# Patient Record
Sex: MALE | Employment: FULL TIME | URBAN - METROPOLITAN AREA
[De-identification: names, ages, dates, MRNs, and addresses within clinical notes are randomized per-mention and may not be internally consistent; named-entity substitution may affect disease eponyms.]

---

## 2021-06-10 NOTE — PROGRESS NOTES
SUBJECTIVE:   CC: Albert Kumar is an 36 year old male who presents for preventative health visit.     Patient has been advised of split billing requirements and indicates understanding: Yes     Healthy Habits:     Getting at least 3 servings of Calcium per day:  Yes    Bi-annual eye exam:  NO    Dental care twice a year:  Yes    Sleep apnea or symptoms of sleep apnea:  Excessive snoring and Sleep apnea    Diet:  Regular (no restrictions)    Frequency of exercise:  2-3 days/week    Duration of exercise:  30-45 minutes    Taking medications regularly:  Yes    Medication side effects:  None    PHQ-2 Total Score: 1    Additional concerns today:  No    Pt would like to discuss seasonal allergies - pt reports worsening congestion affecting sleep, wakes up several times a night.           PROBLEMS TO ADD ON...  -------------------------------------  Overall well.  Nasal congestion, dripping, sneezing, puffy eyes spring mostly. Wondering how to best treat.     Exercises jogging 3-4 times/week.  From [place] originally.  New real estate business.     Describes mood as being very good despite phq2.      Not feeling sleeping during day but does snore.       Today's PHQ-2 Score:   PHQ-2 ( 1999 [employer]) 6/14/2021   Q1: Little interest or pleasure in doing things 0   Q2: Feeling down, depressed or hopeless 1   PHQ-2 Score 1   Q1: Little interest or pleasure in doing things Not at all   Q2: Feeling down, depressed or hopeless Several days   PHQ-2 Score 1       Abuse: Current or Past(Physical, Sexual or Emotional)- No  Do you feel safe in your environment? Yes    Have you ever done Advance Care Planning? (For example, a Health Directive, POLST, or a discussion with a medical provider or your loved ones about your wishes): No, advance care planning information given to patient to review.  Patient declined advance care planning discussion at this time.    Social History     Tobacco Use     Smoking status: Former Smoker      Types: Cigars     Smokeless tobacco: Never Used   Substance Use Topics     Alcohol use: Yes     If you drink alcohol do you typically have >3 drinks per day or >7 drinks per week? Yes      Alcohol Use 6/14/2021   Prescreen: >3 drinks/day or >7 drinks/week? Yes   AUDIT SCORE  9     AUDIT - Alcohol Use Disorders Identification Test - Reproduced from the World Health Organization Audit 2001 (Second Edition) 6/14/2021   1.  How often do you have a drink containing alcohol? 4 or more times a week   2.  How many drinks containing alcohol do you have on a typical day when you are drinking? 3 or 4   3.  How often do you have five or more drinks on one occasion? Monthly   4.  How often during the last year have you found that you were not able to stop drinking once you had started? Never   5.  How often during the last year have you failed to do what was normally expected of you because of drinking? Never   6.  How often during the last year have you needed a first drink in the morning to get yourself going after a heavy drinking session? Never   7.  How often during the last year have you had a feeling of guilt or remorse after drinking? Less than monthly   8.  How often during the last year have you been unable to remember what happened the night before because of your drinking? Less than monthly   9.  Have you or someone else been injured because of your drinking? No   10. Has a relative, friend, doctor or other health care worker been concerned about your drinking or suggested you cut down? No   TOTAL SCORE 9       Last PSA: No results found for: PSA    Reviewed orders with patient. Reviewed health maintenance and updated orders accordingly - Yes      Reviewed and updated as needed this visit by clinical staff  Tobacco  Allergies  Meds   Med Hx  Surg Hx  Fam Hx  Soc Hx        Reviewed and updated as needed this visit by Provider                History reviewed. No pertinent past medical history.   History reviewed.  "No pertinent surgical history.    Review of Systems  CONSTITUTIONAL: NEGATIVE for fever, chills, change in weight  INTEGUMENTARY/SKIN: NEGATIVE for worrisome rashes, moles or lesions  EYES: NEGATIVE for vision changes or irritation  ENT: NEGATIVE for ear, mouth and throat problems  RESP: NEGATIVE for significant cough or SOB  CV: NEGATIVE for chest pain, palpitations or peripheral edema  GI: NEGATIVE for nausea, abdominal pain, heartburn, or change in bowel habits   male: negative for dysuria, hematuria, decreased urinary stream, erectile dysfunction, urethral discharge  MUSCULOSKELETAL: NEGATIVE for significant arthralgias or myalgia  NEURO: NEGATIVE for weakness, dizziness or paresthesias  PSYCHIATRIC: NEGATIVE for changes in mood or affect    OBJECTIVE:   /85 (Patient Position: Sitting, Cuff Size: Adult Regular)   Pulse 77   Temp 96.9  F (36.1  C) (Tympanic)   Resp 22   Ht 1.803 m (5' 11\")   Wt 95.5 kg (210 lb 9.6 oz)   SpO2 96%   BMI 29.37 kg/m      Physical Exam  GENERAL: healthy, alert and no distress  EYES: Eyes grossly normal to inspection, PERRL and conjunctivae and sclerae normal  HENT: ear canals and TM's normal, nose and mouth without ulcers or lesions  NECK: no adenopathy, no asymmetry, masses, or scars and thyroid normal to palpation  RESP: lungs clear to auscultation - no rales, rhonchi or wheezes  CV: regular rate and rhythm, normal S1 S2, no S3 or S4, no murmur, click or rub, no peripheral edema and peripheral pulses strong  ABDOMEN: soft, nontender, no hepatosplenomegaly, no masses and bowel sounds normal   (male): normal male genitalia without lesions or urethral discharge, no hernia  MS: no gross musculoskeletal defects noted, no edema  SKIN: no suspicious lesions or rashes  NEURO: Normal strength and tone, mentation intact and speech normal  PSYCH: mentation appears normal, affect normal/bright        ASSESSMENT/PLAN:   1. Routine general medical examination at a [facility] " "facility  Reviewed preventive care health maintenance items/immunizations.     2. Seasonal allergies  Recommend fluticasone nasal spray (or nasonex over the counter) daily and nasal washes with neti pot or saline nasal spray.  New pillows yearly/hyypoallergenic pillow covers and washing sheets in hot water weekly.     - fluticasone (FLONASE) 50 MCG/ACT nasal spray; Spray 1 spray into both nostrils daily  Dispense: 16 g; Refill: 11    3. CARDIOVASCULAR SCREENING; LDL GOAL LESS THAN 160    - Lipid panel reflex to direct LDL Fasting    4. Need for Tdap vaccination    - TDAP VACCINE (Adacel, Boostrix)  [[id number]]    5. Screening for HIV (human immunodeficiency virus)    - HIV Antigen Antibody Combo    6. Encounter for hepatitis C screening test for low risk patient    - Hepatitis C Screen Reflex to HCV RNA Quant and Genotype    7. Screening for diabetes mellitus      8. Overweight (BMI 25.0-29.9)    - Glucose    Patient has been advised of split billing requirements and indicates understanding: Yes  COUNSELING:   Reviewed preventive health counseling, as reflected in patient instructions       Regular exercise       Healthy diet/nutrition    Estimated body mass index is 29.37 kg/m  as calculated from the following:    Height as of this encounter: 1.803 m (5' 11\").    Weight as of this encounter: 95.5 kg (210 lb 9.6 oz).     Weight management plan: Discussed healthy diet and exercise guidelines    He reports that he has quit smoking. His smoking use included cigars. He has never used smokeless tobacco.      Counseling Resources:  ATP IV Guidelines  Pooled Cohorts Equation Calculator  FRAX Risk Assessment  ICSI Preventive Guidelines  Dietary Guidelines for Americans, 2010  USDA's MyPlate  ASA Prophylaxis  Lung CA Screening    Joel Daniel Wegener, MD  [facility] [place]  "

## 2021-06-14 ENCOUNTER — OFFICE VISIT (OUTPATIENT)
Dept: FAMILY MEDICINE | Facility: CLINIC | Age: 36
End: 2021-06-14
Payer: COMMERCIAL

## 2021-06-14 VITALS
SYSTOLIC BLOOD PRESSURE: 133 MMHG | OXYGEN SATURATION: 96 % | RESPIRATION RATE: 22 BRPM | HEIGHT: 71 IN | TEMPERATURE: 96.9 F | HEART RATE: 77 BPM | BODY MASS INDEX: 29.48 KG/M2 | WEIGHT: 210.6 LBS | DIASTOLIC BLOOD PRESSURE: 85 MMHG

## 2021-06-14 DIAGNOSIS — Z00.00 ROUTINE GENERAL MEDICAL EXAMINATION AT A HEALTH CARE FACILITY: Primary | ICD-10-CM

## 2021-06-14 DIAGNOSIS — Z11.59 ENCOUNTER FOR HEPATITIS C SCREENING TEST FOR LOW RISK PATIENT: ICD-10-CM

## 2021-06-14 DIAGNOSIS — Z23 NEED FOR TDAP VACCINATION: ICD-10-CM

## 2021-06-14 DIAGNOSIS — Z13.6 CARDIOVASCULAR SCREENING; LDL GOAL LESS THAN 160: ICD-10-CM

## 2021-06-14 DIAGNOSIS — J30.2 SEASONAL ALLERGIES: ICD-10-CM

## 2021-06-14 DIAGNOSIS — Z11.4 SCREENING FOR HIV (HUMAN IMMUNODEFICIENCY VIRUS): ICD-10-CM

## 2021-06-14 DIAGNOSIS — E66.3 OVERWEIGHT (BMI 25.0-29.9): ICD-10-CM

## 2021-06-14 DIAGNOSIS — Z13.1 SCREENING FOR DIABETES MELLITUS: ICD-10-CM

## 2021-06-14 LAB
CHOLEST SERPL-MCNC: 255 MG/DL
GLUCOSE SERPL-MCNC: 84 MG/DL (ref 70–99)
HCV AB SERPL QL IA: NONREACTIVE
HDLC SERPL-MCNC: 61 MG/DL
HIV 1+2 AB+HIV1 P24 AG SERPL QL IA: NONREACTIVE
LDLC SERPL CALC-MCNC: 169 MG/DL
NONHDLC SERPL-MCNC: 194 MG/DL
TRIGL SERPL-MCNC: 127 MG/DL

## 2021-06-14 PROCEDURE — 90715 TDAP VACCINE 7 YRS/> IM: CPT | Performed by: FAMILY MEDICINE

## 2021-06-14 PROCEDURE — 36415 COLL VENOUS BLD VENIPUNCTURE: CPT | Performed by: FAMILY MEDICINE

## 2021-06-14 PROCEDURE — 86803 HEPATITIS C AB TEST: CPT | Performed by: FAMILY MEDICINE

## 2021-06-14 PROCEDURE — 80061 LIPID PANEL: CPT | Performed by: FAMILY MEDICINE

## 2021-06-14 PROCEDURE — 82947 ASSAY GLUCOSE BLOOD QUANT: CPT | Performed by: FAMILY MEDICINE

## 2021-06-14 PROCEDURE — 87389 HIV-1 AG W/HIV-1&-2 AB AG IA: CPT | Performed by: FAMILY MEDICINE

## 2021-06-14 PROCEDURE — 99385 PREV VISIT NEW AGE 18-39: CPT | Mod: 25 | Performed by: FAMILY MEDICINE

## 2021-06-14 PROCEDURE — 90471 IMMUNIZATION ADMIN: CPT | Performed by: FAMILY MEDICINE

## 2021-06-14 RX ORDER — FLUTICASONE PROPIONATE 50 MCG
1 SPRAY, SUSPENSION (ML) NASAL DAILY
Qty: 16 G | Refills: 11 | Status: SHIPPED | OUTPATIENT
Start: 2021-06-14 | End: 2022-12-15

## 2021-06-14 ASSESSMENT — MIFFLIN-ST. JEOR: SCORE: 1902.41

## 2021-06-14 NOTE — NURSING NOTE
Prior to immunization administration, verified patients identity using patient s name and date of birth. Please see Immunization Activity for additional information.     Screening Questionnaire for Adult Immunization    Are you sick today?   No   Do you have allergies to medications, food, a vaccine component or latex?   No   Have you ever had a serious reaction after receiving a vaccination?   No   Do you have a long-term health problem with heart, lung, kidney, or metabolic disease (e.g., diabetes), asthma, a blood disorder, no spleen, complement component deficiency, a cochlear implant, or a spinal fluid leak?  Are you on long-term aspirin therapy?   No   Do you have cancer, leukemia, HIV/AIDS, or any other immune system problem?   No   Do you have a parent, brother, or sister with an immune system problem?   No   In the past 3 months, have you taken medications that affect  your immune system, such as prednisone, other steroids, or anticancer drugs; drugs for the treatment of rheumatoid arthritis, Crohn s disease, or psoriasis; or have you had radiation treatments?   No   Have you had a seizure, or a brain or other nervous system problem?   No   During the past year, have you received a transfusion of blood or blood    products, or been given immune (gamma) globulin or antiviral drug?   No   For women: Are you pregnant or is there a chance you could become       pregnant during the next month?   No   Have you received any vaccinations in the past 4 weeks?   No     Immunization questionnaire answers were all negative.        Per orders of Dr. Wegener, injection of Adacel given by Nataly Mercado MA. Patient instructed to remain in clinic for 15 minutes afterwards, and to report any adverse reaction to me immediately.       Screening performed by Nataly Mercado MA on 6/14/2021 at 10:35 AM.  Nataly Mercado MA on 6/14/2021 at 10:35 AM

## 2021-06-14 NOTE — LETTER
"Eladia 15, 2021      Albert Kumar  [address] [facility] [id number]      Dear Albert Kumar,     I have reviewed your lab and radiology reports and no further evaluation or medication changes are needed unless your atherosclerotic heart cardiovascular disease risk score (ASCVD- based on cholesterol) was not in a good range - please see that below.       I have provided information about common lab tests.     If you have questions please send me a Koozoot message, telephone call or follow up with a virtual visit.     Best,     Joel Wegener,MD   --------------------------------------------------   LAB REFERENCE INFORMATION       If we checked your cholesterol your \"heart attack\"  risk score is below this is also called ASCVD or atherosclerotic cardiovascular disease risk score.   The goal is to be under10% for people who have had  heart attacks, strokes,  diabetes or vascular disease. For others the goal is to at least be below 20% .     The ASCVD Risk score (Rashadolivia RAMIRES Jr., et al., 2013) failed to calculate for the following reasons:     The 2013 ASCVD risk score is only valid for ages 40 to 79       CMP/BMP (metabolic panels with electrolyte levels,  kidney and liver function).  If these results require medication changes I would let you know.     MICROALBUMIN - urine protein test that also looks for kidney damage.     GLUCOSE (included in metabolic panels above) - Fasting levels above 100 are in the pre-diabetic range.  Levels above 124 are in the diabetic range.         TSH with T4  - (thyroid hormones).  If you labs were not normal and you do not know the plan for this please contact me.     Hemoglobin A1c - \"Diabetes control test\" - goal for most patients to be below 7.       Vitamin D - summer sun needed to get enough.  Low levels related to fatigue and loss of bone strength.  If your level is low I recommend taking 1, 000 to 3, 000 units daily over the winter.  If it is normal you can take 400 " units daily.     CBC (complete blood counts).  Screen for anemia, bleeding disorders, leukemia.  If significant abnormal or unstable results I will let you know.     PSA (Prostate Specific Antigen) - If this is elevated it can be a sign of prostate cancer or also an enlarged prostate. If yours is high you should see a urologist to look into it further.     Resulted Orders   Lipid panel reflex to direct LDL Fasting   Result Value Ref Range    Cholesterol 255 (H) <200 mg/dL      Comment:      Desirable:       <200 mg/dl    Triglycerides 127 <150 mg/dL      Comment:      Fasting specimen    HDL Cholesterol 61 >39 mg/dL    LDL Cholesterol Calculated 169 (H) <100 mg/dL      Comment:      Above desirable:  100-129 mg/dl  Borderline High:  130-159 mg/dL  High:             160-189 mg/dL  Very high:       >189 mg/dl      Non HDL Cholesterol 194 (H) <130 mg/dL      Comment:      Above Desirable:  130-159 mg/dl  Borderline high:  160-189 mg/dl  High:             190-219 mg/dl  Very high:       >219 mg/dl     Glucose   Result Value Ref Range    Glucose 84 70 - 99 mg/dL      Comment:      Fasting specimen   Hepatitis C Screen Reflex to HCV RNA Quant and Genotype   Result Value Ref Range    Hepatitis C Antibody Nonreactive NR^Nonreactive      Comment:      Assay performance characteristics have not been established for newborns,   infants, and children     HIV Antigen Antibody Combo   Result Value Ref Range    HIV Antigen Antibody Combo Nonreactive NR^Nonreactive          Comment:      HIV-1 p24 Ag & HIV-1/HIV-2 Ab Not Detected

## 2021-09-18 ENCOUNTER — HEALTH MAINTENANCE LETTER (OUTPATIENT)
Age: 36
End: 2021-09-18

## 2021-12-16 ENCOUNTER — TELEPHONE (OUTPATIENT)
Dept: BEHAVIORAL HEALTH | Facility: CLINIC | Age: 36
End: 2021-12-16
Payer: COMMERCIAL

## 2021-12-16 ASSESSMENT — ANXIETY QUESTIONNAIRES
4. TROUBLE RELAXING: MORE THAN HALF THE DAYS
GAD7 TOTAL SCORE: 13
2. NOT BEING ABLE TO STOP OR CONTROL WORRYING: MORE THAN HALF THE DAYS
7. FEELING AFRAID AS IF SOMETHING AWFUL MIGHT HAPPEN: SEVERAL DAYS
GAD7 TOTAL SCORE: 13
6. BECOMING EASILY ANNOYED OR IRRITABLE: NEARLY EVERY DAY
1. FEELING NERVOUS, ANXIOUS, OR ON EDGE: MORE THAN HALF THE DAYS
5. BEING SO RESTLESS THAT IT IS HARD TO SIT STILL: MORE THAN HALF THE DAYS
7. FEELING AFRAID AS IF SOMETHING AWFUL MIGHT HAPPEN: SEVERAL DAYS
3. WORRYING TOO MUCH ABOUT DIFFERENT THINGS: SEVERAL DAYS

## 2021-12-17 ASSESSMENT — ANXIETY QUESTIONNAIRES: GAD7 TOTAL SCORE: 13

## 2021-12-20 ENCOUNTER — HOSPITAL ENCOUNTER (OUTPATIENT)
Dept: BEHAVIORAL HEALTH | Facility: CLINIC | Age: 36
Discharge: HOME | End: 2021-12-20
Attending: FAMILY MEDICINE | Admitting: FAMILY MEDICINE
Payer: COMMERCIAL

## 2021-12-20 VITALS — BODY MASS INDEX: 27.09 KG/M2 | HEIGHT: 72 IN | WEIGHT: 200 LBS

## 2021-12-20 PROCEDURE — H0001 ALCOHOL AND/OR DRUG ASSESS: HCPCS | Mod: GT,95

## 2021-12-20 ASSESSMENT — COLUMBIA-SUICIDE SEVERITY RATING SCALE - C-SSRS
2. HAVE YOU ACTUALLY HAD ANY THOUGHTS OF KILLING YOURSELF LIFETIME?: NO
1. IN THE PAST MONTH, HAVE YOU WISHED YOU WERE DEAD OR WISHED YOU COULD GO TO SLEEP AND NOT WAKE UP?: NO
1. IN THE PAST MONTH, HAVE YOU WISHED YOU WERE DEAD OR WISHED YOU COULD GO TO SLEEP AND NOT WAKE UP?: NO
2. HAVE YOU ACTUALLY HAD ANY THOUGHTS OF KILLING YOURSELF?: NO

## 2021-12-20 ASSESSMENT — PAIN SCALES - GENERAL: PAINLEVEL: NO PAIN (0)

## 2021-12-20 ASSESSMENT — ANXIETY QUESTIONNAIRES: GAD7 TOTAL SCORE: 13

## 2021-12-20 ASSESSMENT — MIFFLIN-ST. JEOR: SCORE: 1870.19

## 2021-12-20 NOTE — PROGRESS NOTES
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  alcohol.  4.)  Craving, or a strong desire or urge to use the substance.  Met for:  alcohol.  5.)  Recurrent use of the substance resulting in a failure to fulfill major role obligations at work, school, or home.  Met for:  alcohol.  6.)  Continued use of the substance despite having persistent or recurrent social or interpersonal problems caused or exacerbated by the effects of its use.  Met for:  alcohol.  7.)  Important social, occupational, or recreational activities are given up or reduced because of the substance.  Met for:  alcohol.  8.)  Recurrent use of the substance in which it is physically hazardous.  Met for:  alcohol.  10.)  Tolerance:  either a need for markedly increased amounts of the substance to achieve the desired effect or a markedly diminished effect with continued use of the dame amount of the substance.  Met for:  alcohol.  11.)  Withdrawal:  either patient endorses characteristic withdrawal syndrome for the substance or the substance (or closely related substance) is taken to relieve or avoid withdrawal symptoms.  Met for:  alcohol.    As evidenced by self report and criteria, the patient meets the following DSM-5 Diagnoses: (Sustained by DSM-5  Criteria Listed Above)      1.)  Alcohol Use Disorder Severe - 303.90 (F10.20)    Specify if: In early remission:  After full criteria for alcohol/drug use disorder were previously met, none of the criteria for alcohol/drug use disorder have been met for at least 3 months but for less than 12 months (with the exception that Criterion A4,  Craving or a strong desire or urge to use alcohol/drug  may be met).     In sustained remission:   After full criteria for alcohol use disorder were previously met, none of the criteria for alcohol/drug use disorder have been met at any time during a period of 12 months or longer (with the exception that Criterion A4,  Craving or strong desire or urge to use alcohol/drug  may be met).     Specify if:   This additional specifier is used if the individual is in an environment where access to alcohol is restricted.    Mild: Presence of 2-3 symptoms  Moderate: Presence of 4-5 symptoms  Severe: Presence of 6 or more symptoms    Recommendations:     1. Plan for Safety and Risk Management:     It was recommended the patient call 911 or go to the local Emergency Department should there be any significant change in the above risk factors.            Report to child / adult protection services was NA.    2. [facility] Referrals:      Recommendations:      1.)  It was recommended the patient abstain from alcohol and from all other non-prescribed mood altering chemicals.   2.)  Follow all of the recommendations of his healthcare providers.  3.)  He may benefit from having an outpatient mental health evaluation to rule out having a current clinical mental health disorder.  4.)  Enter a virtual or hybrid Intensive Outpatient program at one of [facility] locations for substance use disorder treatment.  5.)  Follow all of the recommendations of his substance use disorder treatment providers.  6.)  Attend 12-step or other support group meetings on a weekly basis.     Additionally, if the patient  were unable to maintain abstinence from alcohol at the intensive outpatient level of care, he may benefit from considering entering a residential substance use disorder treatment program.    Rational for recommended level of care: The patient lacks long-term sober maintenance skills, lacks sober coping skills, lacks awareness regarding the disease model of addiction, lacks a sober peer support network and he works full time as a  so he lacks an alcohol-free work environment.    The patient reported he is willing to follow the above recommendations.    The patient would like the following family or other support people involved in their treatment:  None at this time.    The patient does not have a history of opiate use.    3.  Mental Health Referrals:     The patient may benefit from having an outpatient mental health evaluation to rule out having a current clinical mental health disorder.    4. Cultural Concerns:    The patient did not identify having any cultural concerns regarding mental health, physical health, or substance use issues.     5. Recommendations for treatment focus:      Alcohol / Substance Use - See #2. [facility] Referrals above for details on recommendations.    Provider Name/ Credentials:  RONALDO Tejeda  December 20, 2021

## 2022-01-04 ENCOUNTER — DOCUMENTATION ONLY (OUTPATIENT)
Dept: ADDICTION MEDICINE | Facility: HOSPITAL | Age: 37
End: 2022-01-04
Payer: COMMERCIAL

## 2022-01-04 NOTE — PROGRESS NOTES
No Show to Intake    D)P[facility]et was no show to today's intake for substance use disorder outpatient treatment  A)Patient was contacted twice before the intake to remind and twice after he missed the appointment.  Pt was asked to contact  if he needs to reschedule intake.     Jacky Nash [facility] 1/4/2022 12:30 PM

## 2022-01-07 ENCOUNTER — VIRTUAL VISIT (OUTPATIENT)
Dept: ADDICTION MEDICINE | Facility: CLINIC | Age: 37
End: 2022-01-07
Payer: COMMERCIAL

## 2022-01-07 ENCOUNTER — HOSPITAL ENCOUNTER (EMERGENCY)
Facility: CLINIC | Age: 37
End: 2022-01-07
Payer: COMMERCIAL

## 2022-01-07 ENCOUNTER — DOCUMENTATION ONLY (OUTPATIENT)
Dept: ADDICTION MEDICINE | Facility: HOSPITAL | Age: 37
End: 2022-01-07
Payer: COMMERCIAL

## 2022-01-07 DIAGNOSIS — F10.20 ALCOHOL USE DISORDER, SEVERE, DEPENDENCE (H): ICD-10-CM

## 2022-01-07 PROCEDURE — H2035 A/D TX PROGRAM, PER HOUR: HCPCS | Mod: GT,95

## 2022-01-07 NOTE — NURSING NOTE
Comprehensive Assessment     Based on client interview, review of previous assessments and   comprehensive assessment interview the following diagnosis and recommendations are:     Date: 2022     Client: Albert Kumar  MRN; [id number]   : 1985  Age: 36 year old Sex: male  Last 4 digits of Social Security: 1483  Race/Ethnicity:   Do you belong to a [place]: No Which [place]? NA [place]ide on reservation: [place]  Marital Status: Single  Sexual Orientation: Heterosexual  Employment: Full time                                                                                                                      Level of Education:  Bachelors Degree in [employer] Socio-economic (source of income) Status: Employment and investment, etc.    Is assistance required in the ability to read and understand written material?   No    Reason for admission today:  Drinking went down from social drinking to daily heavy drinking.  Family and self concerned of the situation.         Dimension I Acute intoxication/Withdrawal Potential:    Chemical use history (patient self-reported use within the last 30 days)  Primary Drug Used  Age of First Use  Most Recent Pattern of Use and Duration    Date of last use  Time if substance use in the last 30 days Withdrawal Potential? Requiring special care  Method of use   (oral, smoked, snort, IV, etc)    Alcohol  11 Daily,drinking of 15-20 drinks 1/3/2022 Evening  No Oral    Marijuana/Hashish  No use        Cocaine/Crack  No use         Meth/Amphetamines  No use         Heroin  No use         Other Opiates/Synthetics  No use         Inhalants  No use         Benzodiazepines  No use         Hallucinogens  No use         Barbiturates/Sedatives/Hypnotics  No use         Over-the-Counter Drugs  No use         Other  No use         Nicotine  No use             Is patient experiencing withdrawal at time of admission:  Yes, Client reports the following withdrawal symptoms; anxiety.,  jittery hands, sweat.      Have you ever been to detox: no    Dimension One: Acute Intoxication/Withdrawal Potential     Rating and narrative: 1 - Minor Problem . Date of last use of alcohol-1/3/2022 in the evening.  Pt states after few days of sobriety, he is experiencing jitterly hands, some anxiety, sweat.  Pt does not report need for medical detox and states he is able to tolerate the symptoms.      Dimension II Biomedical Conditions:    Any known health conditions:  No    Physical Health Concerns/Conditions:  There are no problems to display for this patient.    Are Health Concerns/Conditions being treated? NA  By Whom? Joel Daniel Irwin Wegener MD at [facility]     Pain Screening:  Does the patient have any pain? No    Dietary Screening:  Does the patient have a history of an eating disorder or been over-eating, avoiding meals, or inducing vomiting?  No  Does the patient have any concerns regarding your nutritional status? If yes, please explain: no  Has the patient had any appetite changes in the last 3 months?  No  Has the patient had any weight loss or weight gain in the last 3 months? No    Dental Screening:  Does the patient have any dental concerns? (Problems with teeth, pain, cavities, braces)? No    Current Medications:  Current Outpatient Medications   Medication     fluticasone (FLONASE) 50 MCG/ACT nasal spray     No current facility-administered medications for this visit.       Does patient indicate awareness of any association between substance use and listed health concerns/conditions? NA    Are you pregnant: NA    Dimension Two: Biomedical Condition and Complications    Rating and narrative: 0 - No Problem . Pt reports his medical and dental health are well at this time.  Pt has [facility] and able to access health care.  Pt has established primary care with Dr. Wally David at [facility].      Dimension III Emotional/Behavioral/Cognitive:      Oriented to person, place, time,  situation?  No     Mental Health Diagnosis: No    Current Mental Health Services: No    History of Mental Health Services: No    GAIN-SS Tool:    (update flowsheet if assessment has not been completed within the last 30 days)  When was the last time that you had significant problems... 12/20/2021   with feeling very trapped, lonely, sad, blue, depressed or hopeless about the future? Past month   with sleep trouble, such as bad dreams, sleeping restlessly, or falling asleep during the day? Past Month   with feeling very anxious, nervous, tense, scared, panicked or like something bad was going to happen? Past month   with becoming very distressed & upset when something reminded you of the past? 2 to 12 months ago   with thinking about ending your life or committing suicide? Never     When was the last time that you did the following things 2 or more times? 12/20/2021   Lied or conned to get things you wanted or to avoid having to do something? Past month   Had a hard time paying attention at school, work or home? 1+ years ago   Had a hard time listening to instructions at school, work or home? 1+ years ago   Were a bully or threatened other people? 1+ years ago   Started physical fights with other people? 1+ years ago         Past or Current Issues with Gambling (Explain): No    Prior Treatment for Gambling:NA     Hazardous behavior engaged in which placed self or others in danger (i.e., exposure blood-borne pathogens/STIs, unsafe sex, sharing needles, etc.)?   No    Family history of substance and/or mental health diagnosis/issues? Yes   Explain: alcohol use in mother's side of the family.  One of my sibling was a drinker but quit drinking.      History of abuse (Physical, Emotional, Sexual)? No  Explain: NA    Dimension Three: Emotional/Behavioral/Cognitive Conditions & Complications    Rating and narrative: 1 - Minor Problem .  Patient did not report past or current mental health diagnoses.  Patient reports no  "current individual psychotherapy nor is he on medications. Pt reported daily heavy drinking impacting his emotional health in the past.  Patient was positive on the GAIN-SS. At the time of the comprehensive assessment there were no reports of SI/SIB/HI.     Dimension IV Readiness to Change:    Mandated, or coerced into assessment or treatment: No    Does client feel there is a problem:   Yes     Verbalization of need/desire to change:   Yes     Willing to follow treatment recommendations: yes     Dimension Four: Treatment Acceptance/Resistance     Rating and narrative: 0 - No Problem .  Patient seems motivated for treatment.  Pt reports there is no pressure/mandate from court/probation/legal issues and he decided to seek help on his term.  Pt stats the last use was on 1/3/2022 and has been sober for 4 days at the time of intake.   Pt verbalizes drinking as a problem and his need to quit.  He seems to be at the very early stage of Action stage in the Stage of Change     Dimension V Relapse/Continued Use/Continued Problem Potential:     Client age at First Treatment: Patient reports this is his very first [facility] treatment in his lifetime.  He is 36 years old.     Lifetime # of CD Treatments:  0   List program, dates, and status of completion (within last five years): NA    Longest Period of Abstinence:  5-6 months  How did you accomplish this?  Got into new activities and shifted my mid off drinking     Circumstances which led to Relapse: \"I can't put finger around it.\"     Risk Taking/Problem Behaviors Related to Use and/or Under the Influence: No     Dimension Five: Continued Use/Relapse Prevention     Rating and narrative:3 - Severe Problem .  Last use of alcohol on 1/3/2022.  This was the date of original intake but patient admits he relapsed on that day. Pt reports the longest sobriety in his lifetime as 5-6 months on his own.  He seems to have little insight into his triggers as evidenced by his statement. \"I can't " "put my fingers around it.\"  At this time he  is at high vulnerability for relapse.     Dimension VI Recovery Environment:   Family support:  Yes  Peer Sober Support: No: \"I kept my drinking very secret to all my friends.  They don't even know I am doing outpatient treatment.\"      Current living circumstances:  with girlfriend.  She knows I am going into outpatient treatment      Environment supportive of recovery: yes     Specific activities participating in which do not involve substance use: none at this time.  I used to run/jog/ski/cycle but I gave them up after drinking.        Specific activities participating in which do involve substance use: dinner, sporting events, concerts, etc.      People, things that threaten recovery: No    Desired family involvement in treatment services: yes maybe, parents.      Has your 's license ever been revoked: No    Current/Pending legal involvement :  No    Lifetime legal consequences related to use: No  Number of lifetime arrests: No    Current CPS involvement: N/A    Current support network for recovery (including community-based recovery support): none     Current ability to function in a work and/or education setting: Pt states he is able to show up and function in those situations.      Dimension Six: Recovery Environment     Rating and narrative: 2 - Moderate Problem . Patient reports his current incomes comes from employment and investments.  Patient states he has college degree in [email or web address] communication and has been able to fulfill his occupational obligations.  He is currently living independently with his girlfriend and renting an apartment in [place].   He reports he kept his drinking to himself and none of his friends know about the depth of his addiction or the fact that his is getting treatment.  Pt states his parents and girlfriends are supportive of his recovery.   Patent does not report any past or current legal issues.  This is patient's very first " [facility] treatment in lifetime.   Pt is not engaged in any community sober support group.        DSM-V Criteria for Substance Abuse  Instructions:  Determine whether the client currently meets the criteria for a Substance Use Disorder using the diagnostic criteria in the  DSM-V, pp. 481-589. Current means during the most recent 12 months outside a facility that controls access to substances.    Category of substance Severity ICD-10 Code/DSM V Code  Alcohol Use Disorder Mild  Moderate  Severe (F10.10) (305.00)  (F10.20) (303.90)  (F10.20) (303.90)   Cannabis Use Disorder Mild  Moderate  Severe (F12.10) (305.20)  (F12.20) (304.30)  (F12.20) (304.30)   Hallucinogen Use Disorder Mild  Moderate  Severe (F16.10) (305.30)  (F16.20) (304.50)  (F16.20) (304.50)   Inhalant Use Disorder Mild  Moderate  Severe (F18.10) (305.90)  (F18.20) (304.60)  (F18.20) (304.60)   Opioid Use Disorder Mild  Moderate  Severe (F11.10) (305.50)  (F11.20) (304.00)  (F11.20) (304.00)   Sedative, Hypnotic, or Anxiolytic Use Disorder Mild  Moderate  Severe (F13.10) (305.40)  (F13.20) (304.10)  (F13.20) (304.10)   Stimulant Related Disorders Mild              Moderate              Severe   (F15.10) (305.70) Amphetamine type substance  (F14.10) (305.60) Cocaine  (F15.10) (305.70) Other or unspecified stimulant    (F15.20) (304.40) Amphetamine type substance  (F14.20) (304.20) Cocaine  (F15.20) (304.40) Other or unspecified stimulant    (F15.20) (304.40) Amphetamine type substance  (F14.20) (304.20) Cocaine  (F15.20) (304.40) Other or unspecified stimulant   Disorder Tobacco use Disorder Mild  Moderate  Severe (Z72.0) (305.1)  (F17.200) (305.1)  (F17.200) (305.1)   Other (or unknown) Substance Use Disorder Mild  Moderate  Severe (F19.10) (305.90)  (F19.20) (304.90)  (F19.20) (304.90)     Patient meets criteria for:  303.90 (F10.20) Alcohol Use Disorder Severe    I have reviewed the information on the assessment, medical history and checklist. Any pertinent  updated information is included above, which was obtained from the client before attending the first day in group.     RONALDO Ravi  2022   11:10 AM        Outpatient Substance Use Disorder ISP/[facility]/ [place] Re-Assessment:     Patient  Name: Albert Kumar  MRN: [id number]   : 1985  Admit Date: 2022       Initial Service Plan (ISP)    Immediate health, safety, and preliminary service needs identified and plan includes the following based on available information from patient, referral sources, and collateral information.    Safety (SI, SIB, suicide attempts, aggressive behaviors): Patient denies immediate concerns.       Health:  Patient is able to get medical care as needed. He has [employer] as health insurance.      Transportation: NA.  Patient is getting treatment via telemedicine.       Other:  To learn recovery skills to prevent relapse.       Issues to be addressed in the first sessions:   None reported by the patient     Patient strengths and needs:   Strengths identified as supportive parents and girlfriend  Needs identified as learning to quit drinking     Patient does not have any identified barriers to participating in referred services.    Plan for patient for time between intake and completion of the treatment plan:   Attend all group therapy sessions as directed, complete all written and oral assignments as directed, and remain abstinent and sober. A relapse, if any, must be reported to staff immediately in order to ensure you are receiving the proper level of care. If you cannot attend a group therapy session you must call contact information provided in intake folder and leave a message before or during group hours.     RONALDO Ravi: [email or web address] and RONALDO Seth; [email or web address]    Vulnerable Adult Assessment:     Does the patient possess a physical or mental infirmity or other physical, mental, or emotional dysfunction?   No. Patient is not a vulnerable adult.    This patient is not a functional Vulnerable Adult according to [place] Statute 626.5572 subdivision 21.      [place] Re-Assessment:     Have you ever wished you were dead or that you could go to sleep and not wake up? Lifetime?  No   Past Month?  NA    Have you actually had any thoughts of killing yourself?  Lifetime?  No  Past Month?  No    Have you been thinking about how you might do this? Lifetime? No.  Past Month?  NA    Have you had these thoughts and had some intention of acting on them?  Lifetime?  No   Past Month?  N/A     Have you started to work out the details of how to kill yourself?  Lifetime?  No   Past Month?  N/A     Do you intend to carry out this plan?   No     When you have the thoughts how long do they last?   N/A    Are there things - anyone or anything (ie Family, Religious, pain of death) that stopped you from wanting to die or acting on thoughts of suicide?   Does not apply        2008  [facility]  Used with permission by Tricia Waters, PhD.      Staff Name/Title: [facility]y [facility]   Date: 2022   Time: 11:49 AM    Patient Safety Plan                                Name:   Albert Kumar YOB: 1985 Age:  36 year old MR Number:  [id number]   Step 1: Warning signs (Thoughts, images, mood, situation, behavior) that a crisis may be developin. No really   2.     3.      Step 2: Internal coping strategies - Things I can do to take my mind off of my problems without contacting another person (relaxation technique, physical activity):     1. Diverting thoughts      2. Doing different activities in the past      3.      Step 3: People and social settings that provide distraction:     1. Name: no    Phone:    2. Name:    Phone:   3. Place:    4. Place:      The one thing that is most important to me and worth living for is: my life, my health     Step 4: People whom I can ask for  help:     1. Name:   Deborah Kumar (parents) Phone:   [phone] (mom); [phone] (dad)   2. Name:   Lorraine Mckinnon (girlfriend) Phone: [phone]     3. Name:    Phone:      Step 5: Professionals or agencies I can contact during a crisis: none      1. Clinician Name:    Phone:   Clinician Pager or Emergency Contact #:      2. Clinician Name:  Phone:      Clinician Pager or Emergency Contact #:      3. Local Urgent Care Services: Urgent Care for Adult Mental Health    Urgent Care Services Address: [address] [place] [address]    Urgent Care Services Phone: [phone]     4. Suicide Prevention Lifeline Phone: [phone] ([phone])     Step 6: Making the environment safe:     1. Pt reports his living environment is safe.       2.      Safety Plan Template 2008 Tamanna Ribeiro and Jayson Olmedo is reprinted with the express permission of the authors.  No portion of the Safety Plan Template may be reproduced without the express, written permission.  You can contact the authors at [email or web address] or [email or web address].           Substance Use Disorder Outpatient Treatment Intake Summary     Albert Kumar is a 36 year old male who is being evaluated via a billable video visit.      Telemedicine Visit: The patient's condition can be safely assessed and treated via synchronous audio and visual telemedicine encounter.      Reason for Telemedicine Visit: Patient has requested telehealth visit    Originating Site (Patient Location): Patient's home    Distant Site (Provider Location): Provider Remote Setting- Home Office    Consent:  The patient/guardian has verbally consented to: the potential risks and benefits of telemedicine (video visit) versus in person care; bill my insurance or make self-payment for services provided; and responsibility for payment of non-covered services.     Mode of Communication:  Video Conference Medical Zoom    Call Started at: 11:00 AM  Call Ended at:  "12:05 PM    As the provider I attest to compliance with applicable laws and regulations related to telemedicine.    D) Albert Kumar is a 36 year old  Single  male who has self-referred himself to [facility] outpatient treatment    with funding from [facility]. Patient orientated x 3. Patient meets criteria for 303.90 (F10.20) Alcohol Use Disorder Severe.  Patient appears appropriate for substance use disorder treatment, Day Outpatient/Service Coordination.      A) Met with patient for 55 minutes on 1/7/2022 .  Completed intake assessment and preliminary paperwork. Patient was given and explained counselor & supervisor license number and contact information, Patient Bill of Rights, program rules/regulations, Program Abuse Prevention Plan, confidentiality & HIPPA policies, grievance procedure, presented ROIs, TB & HIV/AIDS policies & resources, and Vulnerable Adult policy.   Conducted Vulnerable Adult Assessment.     R) This patient is not a functional Vulnerable Adult according to [place] Statute 626.5572 subdivision 21.        Patient signed and agreed to counselor & supervisor license number and contact info, Patient Bill of Rights, group rules/regulations, Program Abuse Prevention Plan, confidentiality & HIPPA policies, grievance procedure,  ROIs, TB & HIV/AIDS policies & resources, and Vulnerable Adult policy. Patient scored zero on C-SSRS screen. Patient denied suicidal ideation/intent/plan/means at this time.     Opioid Use Disorder: No   Provided \"Options for Opioid Treatment in [place] and Overdose Prevention\" No.  Patient is not an opioid user.      Dimension #1 - Withdrawal Potential - Risk 1 - Minor Problem . Date of last use of alcohol-1/3/2022 in the evening.  Pt states after few days of sobriety, he is experiencing jitterly hands, some anxiety, sweat.  Pt does not report need for medical detox and states he is able to tolerate the symptoms.  " "    Dimension #2 - Biomedical - Risk 0 - No Problem . Pt reports his medical and dental health are well at this time.  Pt has [facility] and able to access health care.  Pt has established primary care with Dr. Wally David at [facility].      Dimension #3 - Emotional , Behavioral and Cognitive - Risk 1 - Minor Problem . Patient did not report past or current mental health diagnoses.  Patient reports no current individual psychotherapy nor is he on medications. Pt reported daily heavy drinking impacting his emotional health in the past.  Patient was positive on the GAIN-SS. At the time of the comprehensive assessment there were no reports of SI/SIB/HI.     Dimension #4 - Readiness for Change - Risk 0 - No Problem . Patient seems motivated for treatment.  Pt reports there is no pressure/mandate from court/probation/legal issues and he decided to seek help on his term.  Pt stats the last use was on 1/3/2022 and has been sober for 4 days at the time of intake.   Pt verbalizes drinking as a problem and his need to quit.  He seems to be at the very early stage of Action stage in the Stage of Change     Dimension #5 - Relapse Potential - Risk 3 - Severe Problem . Last use of alcohol on 1/3/2022.  This was the date of original intake but patient admits he relapsed on that day. Pt reports the longest sobriety in his lifetime as 5-6 months on his own.  He seems to have little insight into his triggers as evidenced by his statement. \"I can't put my fingers around it.\"  At this time he  is at high vulnerability for relapse.     Dimension #6 - Recovery Environment - Risk 2 - Moderate Problem . Patient reports his current incomes comes from employment and investments.  Patient states he has college degree in [employer] and has been able to fulfill his occupational obligations.  He is currently living independently with his girlfriend and renting an apartment in [place].   He reports he kept his " drinking to himself and none of his friends know about the depth of his addiction or the fact that his is getting treatment.  Pt states his parents and girlfriends are supportive of his recovery.   Patent does not report any past or current legal issues.  This is patient's very first [facility] treatment in lifetime.   Pt is not engaged in any community sober support group.     T) Explained counselor & supervisor license number and contact info, Patient Bill of Rights, program rules/regulations, Program Abuse Prevention Plan, confidentiality & HIPPA policies, grievance procedure, presented ROIs, TB & HIV/AIDS policies & resources, and Vulnerable Adult policy. Patient expected to start group on 1/10/2022.    Jacky Nash [facility]  01/07/22 12:43 PM      Acknowledgement of Current Treatment Plan - Initial Treatment Plan       [facility]  Adult Substance Use Disorder Program  Treatment Plan Requirements       These services are provided by the facility for each patient/client according to the individual's treatment plan:    Individual and group counseling    Education    Transition services    Services to address any co-occurring mental illness    Service coordination    Criteria for discharge:  Patients/clients are discharged from the program following completion of the entire program including all phases of treatment or acceptance of other post-treatment referrals such as sober supportive living, or aftercare at other facilities.  Patients/clients may also be discharged for inappropriate behavior or substance use.      Favorable Discharge - Patients/clients have completed agreed upon treatment goals, understand their diagnosis and appear motivated for continued recovery.    Guarded Discharge - Patients/clients have demonstrated some understanding of their diagnosis and recovery process, and have completed some of their treatment goals.  This prognosis also includes patients/clients who have  completed some treatment goals but have not made commitment to community support or follow through with referrals.    Unfavorable Discharge - Patients/clients have not completed agreed upon treatment goals due to their own choice, have limited understanding of their diagnosis, and have shown minimal or inconsistent behavior conducive to recovery.  Those patients/clients discharged due to behavioral problems will also be unfavorable discharges.    Adult CD Treatment Plan                                Patient Name: Albert Kumar   MRN: [id number]   : 1985  Identified Gender: Male   Admit Date: 2022  Current Treatment Phase: 1  Last Updated: Initial     SUBSTANCE USE DISORDER(S): F10.21 Alcohol Use Disorder, Severe       Dimension 1: Acute Intoxication/Withdrawal Potential, Risk level: 1    Needs identified from Comprehensive Assessment Summary:  Date of last use of alcohol-1/3/2022 in the evening.  Pt states after few days of sobriety, he is experiencing jitterly hands, some anxiety, sweat.  Pt does not report need for medical detox and states he is able to tolerate the symptoms.    Update: Initial   Date of last use: 1/3/2022  Patient currently receiving medication assisted therapy (MAT): No  Current or recent withdrawal symptoms: mild   Focus area: Patient reports date of last use to be 1/3/2022   Date Assigned: 2022  Goal: Patient to maintain abstinence throughout outpatient treatment.  Goal needs to be completed prior to discharge? [x]  Treatment Strategies:   Patient to report any substance/alcohol use to counselor to determine if any changes need to be made to address withdrawal symptoms.   Target Date: 2022  Date Completed:       Dimension 2: Biomedical Conditions/Complications, Risk level: 0    Needs identified from Comprehensive Assessment Summary: Pt reports his medical and dental health are well at this time.  Pt has [employer] and able to access health care.  Pt has established  primary care with Dr. Wally David at [facility].    Update: Initial   Focus area: Patient reports no concern for his health at this time. Treatment focus will be to maintain his current health.    Goal: Maintain physical health   Goal needs to be completed prior to discharge? []  Methods/Strategies (must include amount and frequency):   1. Albert will report any changes to physical health to counselor.   2. Albert will attend all scheduled doctor's appointments.   3. Albert will take medications as prescribed.   Target Date: 7/7/2022  Completion Date:       Dimension 3: Emotional/Behavioral/Cognitive, Risk level: 1    Needs identified from Comprehensive Assessment Summary: Patient did not report past or current mental health diagnoses.  Patient reports no current individual psychotherapy nor is he on medications. Pt reported daily heavy drinking impacting his emotional health in the past.  Patient was positive on the GAIN-SS. At the time of the comprehensive assessment there were no reports of SI/SIB/HI.   Update: Initial   Focus area: Patient does not report ever getting any mental health diagnosis but he reports heavy drinking caused increased anxiety  Goal: Gain knowledge on how alcohol use and addiction can impact mental health symptoms including anxiety  Goal needs to be completed prior to discharge? [x]  Methods/Strategies (must include amount and frequency):   1. Patient agreed to be open to referral to mental health services if/when it is appropriate.   2: Patient to gain better understanding of how his alcohol use effects his anxiety.  Patient will participate in group check in and identify his anxiety level.  Pt will identify 5 ways how alcohol impacted his anxiety.   3. Patient will complete a Self-Care plan to create balance and decrease feelings of anxiety.  Target Date: 7/6/2022  Completion Date:       Dimension 4: Readiness to Change, Risk level 0    Needs identified from  "Comprehensive Assessment Summary: Patient seems motivated for treatment.  Pt reports there is no pressure/mandate from court/probation/legal issues and he decided to seek help on his term.  Pt stats the last use was on 1/3/2022 and has been sober for 4 days at the time of intake.   Pt verbalizes drinking as a problem and his need to quit.  He seems to be at the very early stage of Action stage in the Stage of Change   Update: Initial   Focus area: Patient will maintain his high level of motivation for sobriety he present at intake throughout the treatment   Goal: Maintain individual motivation for recovery   Goal needs Maintainto be completed prior to discharge? [x]  Methods/Strategies (must include amount and frequency):   1. Patient will attend 3, 3-hour groups per week. Days/Times: Monday, Tuesday, and Thursday from 12:30 PM to 3:30 PM.   Zoom ID:   2. Patient will contact staff if unable to attend at [email or web address] [phone] and [email or web address] [phone]  3. Patient to identify 5 motivators to maintain recovery, including both internal and external motivation.   Target Date: 7/6/2022  Completion Date:       Dimension 5: Relapse/Continued Use/Continued Problem Potential, Risk level: 3    Needs identified from Comprehensive Assessment Summary: Last use of alcohol on 1/3/2022.  This was the date of original intake but patient admits he relapsed on that day. Pt reports the longest sobriety in his lifetime as 5-6 months on his own.  He seems to have little insight into his triggers as evidenced by his statement. \"I can't put my fingers around it.\"  At this time he  is at high vulnerability for relapse.  Update: Initial   Focus area: Patient has little insight to triggers and has few coping skills.   Goal: Gain insight into triggers and develop skills to prevent relapse.   Goal needs to be completed prior to discharge? [x]  Methods/Strategies (must include amount and " "frequency):   1. Patient to learn and implement personal coping strategies to manage urges to use alcohol.    2. Patient to identify personal triggers and high-risk situations for relapse.   3. Patient to identify weekly and daily activities he does to maintain his recovery. Share with counselor and in group.   Target Date: 7/6/2022  Completion Date:       Dimension 6: Recovery Environment, Risk level: 2  Needs identified from Comprehensive Assessment Summary: Patient reports his current incomes comes from employment and investments.  Patient states he has college degree in [employer] and has been able to fulfill his occupational obligations.  He is currently living independently with his girlfriend and renting an apartment in [place].   He reports he kept his drinking to himself and none of his friends know about the depth of his addiction or the fact that his is getting treatment.  Pt states his parents and girlfriends are supportive of his recovery.   Patent does not report any past or current legal issues.  This is patient's very first ELIJAH treatment in lifetime.   Pt is not engaged in any community sober support group. Update: Initial  Desire for family involvement: \"yes, maybe\"   Focus area/need: develop sober supportive daily structure   Goal:   Goal needs to be completed prior to discharge? [x]  Methods/Strategies (must include amount and frequency):   1. Patient will identify healthy activities that can replace drinking behavior.  Patient will start those activities and report back to group/counselors how it is helping his recovery.    2. Patient will be open to try community support groups  3. Patient will participate in Telephone Recovery Service   Target Date: 7/6/2022  Completion Date:       Resources  Resources to which the patient is being referred for problems when problems are to be addressed concurrently by another provider: [facility]: [phone]       [x] Contact " insurance to ensure referrals are in network    Vulnerable Adult Review   [X] Review of the facility Abuse Prevention plan was reviewed with the patient   [X] No individual abuse plan is necessary   [ ] In addition to the facility Abuse Prevention plan, an Individual Abuse Plan will be put in place     Patient attests the date of last use as 1/3/2022. Patient attests he have participated in the creation of this treatment plan. Patient has been provided a copy of this treatment plan and is in agreement with how this plan is written and will be stored in the electronic record.       Patient Signature: _____patient verbally signed____________________________ Date: ___1/7/2022______    Counselor Signature: __Jacky Nash MA [facility]____________________________ Date: __1/7/2022_______

## 2022-01-10 ENCOUNTER — VIRTUAL VISIT (OUTPATIENT)
Dept: ADDICTION MEDICINE | Facility: CLINIC | Age: 37
End: 2022-01-10
Attending: FAMILY MEDICINE
Payer: COMMERCIAL

## 2022-01-10 ENCOUNTER — DOCUMENTATION ONLY (OUTPATIENT)
Dept: ADDICTION MEDICINE | Facility: HOSPITAL | Age: 37
End: 2022-01-10
Payer: COMMERCIAL

## 2022-01-10 PROCEDURE — H2035 A/D TX PROGRAM, PER HOUR: HCPCS | Mod: GT,95

## 2022-01-10 NOTE — PROGRESS NOTES
Funding Request to [facility]    D)outpatient funding request faxed to [facility].  1/7/2022 to 7/7/2022, 180 hours.    Jacky Nash [facility] 1/10/2022 12 pm

## 2022-01-10 NOTE — PROGRESS NOTES
Video Visit: Individual Session from 12:30 PM to 02:30 PM.      Provider verified identity through the following two step process.  Patient provided:  Patient photo and Patient     Telemedicine Visit: The patient's condition can be safely assessed and treated via synchronous audio and visual telemedicine encounter.      Reason for Telemedicine Visit: Patient has requested telehealth visit    Originating Site (Patient Location): Patient's home    Distant Site (Provider Location): [facility]: [facility]    Consent:  The patient/guardian has verbally consented to: the potential risks and benefits of telemedicine (video visit) versus in person care; bill my insurance or make self-payment for services provided; and responsibility for payment of non-covered services.     Patient would like the video invitation sent by:  Send to e-mail at: [email or web address]    Mode of Communication:  Video Conference via Medical Zoom    As the provider I attest to compliance with applicable laws and regulations related to telemedicine.    Patient was scheduled for group session today, from 12:30 PM to 03:30 PM. However, attended 1 x 1 session due to low attendance. Patient's intake was done on 2022, and today's was his first ELIJAH therapy session. Patient reports being new to this process and reported about 1 week sobriety. Today's activities included motivational readings, discussions on check-in-questions, and a 10-minute Positive Energy Guided Meditation. Patient reported 7/10 on the scale of 0 to 10 for his emotional and physical health today, due to being new to recovery and not doing much physical activities these days. Patient reports discussions about addiction and mental health in his family was not a comfortable subject, and therefore did not have much open discussions around it. 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 Patient reports his inner strength as his higher power, and reports future possibilities  in recovery as having more time for himself, financial wellness, and will like to travel.

## 2022-01-11 ENCOUNTER — DOCUMENTATION ONLY (OUTPATIENT)
Dept: ADDICTION MEDICINE | Facility: HOSPITAL | Age: 37
End: 2022-01-11
Payer: COMMERCIAL

## 2022-01-13 ENCOUNTER — DOCUMENTATION ONLY (OUTPATIENT)
Dept: ADDICTION MEDICINE | Facility: HOSPITAL | Age: 37
End: 2022-01-13
Payer: COMMERCIAL

## 2022-01-13 ENCOUNTER — VIRTUAL VISIT (OUTPATIENT)
Dept: ADDICTION MEDICINE | Facility: CLINIC | Age: 37
End: 2022-01-13
Attending: FAMILY MEDICINE
Payer: COMMERCIAL

## 2022-01-13 DIAGNOSIS — F10.20 ALCOHOL USE DISORDER, SEVERE, DEPENDENCE (H): Primary | ICD-10-CM

## 2022-01-13 PROCEDURE — H2035 A/D TX PROGRAM, PER HOUR: HCPCS | Mod: HQ,GT,95

## 2022-01-13 NOTE — PROGRESS NOTES
[facility] Weekly Treatment Plan Review      ATTENDANCE for the following date span:  2022 to 2022    Date Monday 1/10/2022 Tuesday 2022 Wednesday 2022 Thursday 2022 Friday 2022   Group Therapy 2.0 hours 0 hours N/A 3 hours N/A   Individual Therapy        Family Therapy        Other (Specify)            Patient attended 2 groups and had one excused absence on .  Pt reported in advance that he won't be able to make to group    Weekly Treatment Plan Review     Treatment Plan initiated on: 2022    Dimension1: Acute Intoxication/Withdrawal Potential -   Previous Dimension Ratin  Current Dimension Ratin  Date of Last Use 1/3/2022  Any reports of withdrawal symptoms - No    Narrative: Date of last use of alcohol-1/3/2022 in the evening.  At intake, patient reported after few days of sobriety, he was experiencing jitterly hands, some anxiety, sweat.  However, patient did not report need for medical detox and states he was able to tolerate the symptoms.   Since the intake, patient reports he is remaining sober and has not reported any issues with withdrawals.        Dimension 2: Biomedical Conditions & Complications -   Previous Dimension Ratin  Current Dimension Ratin  Medical Concerns:  None   Current Medications & Medication Changes:  Current Outpatient Medications   Medication     fluticasone (FLONASE) 50 MCG/ACT nasal spray     No current facility-administered medications for this visit.     Medication Prescriber:  Joel Daniel Irwin Wegener MD, [facility]  Taking meds as prescribed? Yes  Medication side effects or concerns: No  Outside medical appointments this week (list provider and reason for visit):  None     Narratives: Pt reports his medical and dental health are well at this time.  Pt has [employer] and able to access health care.  Pt has established primary care with Dr. Wally David at [facility].      Dimension 3:  Emotional/Behavioral Conditions & Complications -   Previous Dimension Ratin  Current Dimension Ratin  PHQ9   No flowsheet data found.  GAD7     FAISAL-7 SCORE 2021   Total Score 13 (moderate anxiety)   Total Score 13     Mental health diagnosis None   Date of last SIB:  never   Date of  last SI:  never  Date of last HI: never  Behavioral Targets:  N/A  Current MH Assignments:  N/A    Narrative:  At the time of intake, patient did not report past or current mental health diagnoses.  Patient reports no current individual psychotherapy nor is he on medications. Pt reported daily heavy drinking impacting his emotional health in the past.  Patient was positive on the GAIN-SS. At the time of the comprehensive assessment there were no reports of SI/SIB/HI.  Patient attends group and contributing well by reading materials and sharing his feedback.  Pt seems motivated to learn recovery skills.  Pt did not present with any major concern for emotional issue.        Dimension 4: Treatment Acceptance / Resistance -   Previous Dimension Ratin  Current Dimension Ratin  ELIJAH Diagnosis:  Alcohol Use Disorder   303.90 (F10.20) Severe .  Stage - 1  Commitment to tx process/Stage of change- Action  ELIJAH assignments - Maintain internal motivation for recovery by attending groups and listening to fellow group members' addiction stories     Narrative - Patient seems motivated for treatment.  Pt reports there is no pressure/mandate from court/probation/legal issues and he decided to seek help on his term.  Pt stats the last use was on 1/3/2022 and has been sober for 4 days at the time of intake.   Pt verbalizes drinking as a problem and his need to quit.  He seems to be at the very early stage of Action stage in the Stage of Change       Dimension 5: Relapse / Continued Problem Potential -   Previous Dimension Ratin  Current Dimension Rating:  3  Relapses this week - None  Urges to use - None  UA results - No  "results found for this or any previous visit (from the past 168 hour(s)).    Narrative- Last use of alcohol on 1/3/2022.  This was the date of original intake but patient admits he relapsed on that day. Pt reports the longest sobriety in his lifetime as 5-6 months on his own.  He seems to have little insight into his triggers as evidenced by his statement. \"I can't put my fingers around it.\"  Patient was educated on how negative feelings impact thoughts/action in life.  Pt was educated on CBT and 10 cognitive distortion and skills to stop the negative thoughts.        Dimension 6: Recovery Environment -  Previous Dimension Ratin  Current Dimension Ratin  Family Involvement -   Summarize attendance at family groups and family sessions - N/A  Family supportive of treatment?  Yes    Community support group attendance - No  Recreational activities - exercise (30 min jogging a day)    Narrative -  Patient reports his current incomes comes from employment and investments.  Patient states he has college degree in [employer] and has been able to fulfill his occupational obligations.  He is currently living independently with his girlfriend and renting an apartment in [place].   He reports he kept his drinking to himself and none of his friends know about the depth of his addiction or the fact that his is getting treatment.  Pt states his parents and girlfriends are supportive of his recovery.   Patent does not report any past or current legal issues.  This is patient's very first ELIJAH treatment in lifetime.   Pt is not engaged in any community sober support group. Since starting treatment, pt reports he started jogging 30 min a day.      Justification for Continued Treatment at this Level of Care:  Recent alcohol use on 1/3/2022    Discharge Planning:  Target Discharge Date/Timeframe:  2022   Med Mgmt Provider/Appt:  TBD   Ind therapy Provider/Appt:  N/A   Family therapy Provider/Appt:  N/A   Other " referrals:  N/A      Has vulnerable adult status change? No    Service Coordination:  TBD    Supervision:  Every Wednesdays     Are Treatment Plan goals/objectives effective? Yes  *If no, list changes to treatment plan:    Are the current goals meeting client's needs? Yes  *If no, list the changes to treatment plan.    Client Input / Response: Patient admits his drinking became problem and expresses his desire to learn recovery skills.  Pt denies any cravings at this time.       *Client agrees with any changes to the treatment plan: N/A  *Client received copy of changes: N/A  *Client is aware of right to access a treatment plan review: Yes

## 2022-01-13 NOTE — GROUP NOTE
Video Visit:      Provider verified identity through the following two step process.  Patient provided:  Patient photo and Patient is known previously to provider    Telemedicine Visit: The patient's condition can be safely assessed and treated via synchronous audio and visual telemedicine encounter.      Reason for Telemedicine Visit: Patient has requested telehealth visit    Originating Site (Patient Location): Patient's home    Distant Site (Provider Location): [facility]: [facility]    Consent:  The patient/guardian has verbally consented to: the potential risks and benefits of telemedicine (video visit) versus in person care; bill my insurance or make self-payment for services provided; and responsibility for payment of non-covered services.     Patient would like the video invitation sent by:  Send to e-mail at: [email or web address]    Mode of Communication:  Video Conference via Medical Zoom    As the provider I attest to compliance with applicable laws and regulations related to telemedicine.Group Therapy Documentation    PATIENT'S NAME: Albert Kumar  MRN:   [id number]  :   1985  ACCT. NUMBER: [id number]  DATE OF SERVICE: 22  START TIME: 12:30 PM  END TIME:  3:20 PM  FACILITATOR(S): Jacky Nash LADC; Nirav Castro LADC  TOPIC: BEH Group Therapy  Number of patients attending the group:  3  Group Length:  3 Hours    Group Therapy Type: Psychoeducation    Summary of Group / Topics Discussed:    Common Cognitive Distortions and the Benefits of Negative Thinking      Group Attendance:  Attended group session    Patient's response to the group topic/interactions:  cooperative with task, discussed personal experience with topic, expressed readiness to alter behaviors, expressed understanding of topic, gave appropriate feedback to peers, listened actively and offered helpful suggestions to peers    Patient appeared to be Actively participating, Attentive and  Engaged.        Client specific details: Patient reports about 2 weeks of sobriety and rated his emotions at 7/10 and physical health at 8/10; says he has plans to begin physical exercise at the [employer]. Patient reports he is not sure of what gifts he has to share with others; however is good at recognizing his destructive behavior and quickly apologize. Patient reports it is difficult to share feeling of fear, especially fear of death. Patient reports plan for the weekend is to begin physical exercise at the Gym.

## 2022-01-14 ENCOUNTER — DOCUMENTATION ONLY (OUTPATIENT)
Dept: ADDICTION MEDICINE | Facility: HOSPITAL | Age: 37
End: 2022-01-14
Payer: COMMERCIAL

## 2022-01-14 NOTE — PROGRESS NOTES
[facility] Weekly Treatment Plan Review      ATTENDANCE for the following date span:  2022 to 2022    Date 2022   Group Therapy     0 hours   Individual Therapy        Family Therapy        Other (Specify)            Patient attended zero hours during this time period of one day.  Reason: patient was not scheduled for group or individual session.      Weekly Treatment Plan Review     Treatment Plan initiated on: 2022    Dimension1: Acute Intoxication/Withdrawal Potential -   Previous Dimension Ratin  Current Dimension Ratin  Date of Last Use 1/3/2022  Any reports of withdrawal symptoms - No    Narrative: Date of last use of alcohol-1/3/2022 in the evening.  At intake, patient reported after few days of sobriety, he was experiencing jitterly hands, some anxiety, sweat.  However, patient did not report need for medical detox and states he was able to tolerate the symptoms.   Since the intake, patient reports he is remaining sober and has not reported any issues with withdrawals.        Dimension 2: Biomedical Conditions & Complications -   Previous Dimension Ratin  Current Dimension Ratin  Medical Concerns:  None   Current Medications & Medication Changes:  Current Outpatient Medications   Medication     fluticasone (FLONASE) 50 MCG/ACT nasal spray     No current facility-administered medications for this visit.     Medication Prescriber:  Joel Daniel Irwin Wegener MD, [facility]  Taking meds as prescribed? Yes  Medication side effects or concerns: No  Outside medical appointments this week (list provider and reason for visit):  None     Narratives: Pt reports his medical and dental health are well at this time.  Pt has [employer] and able to access health care.  Pt has established primary care with Dr. Wally David at [facility].      Dimension 3: Emotional/Behavioral Conditions & Complications -    Previous Dimension Ratin  Current Dimension Ratin  PHQ9   No flowsheet data found.  GAD7     FAISAL-7 SCORE 2021   Total Score 13 (moderate anxiety)   Total Score 13     Mental health diagnosis None   Date of last SIB:  never   Date of  last SI:  never  Date of last HI: never  Behavioral Targets:  N/A  Current MH Assignments:  N/A    Narrative:  At the time of intake, patient did not report past or current mental health diagnoses.  Patient reports no current individual psychotherapy nor is he on medications. Pt reported daily heavy drinking impacting his emotional health in the past.  Patient was positive on the GAIN-SS. At the time of the comprehensive assessment there were no reports of SI/SIB/HI.  Patient attends group and contributing well by reading materials and sharing his feedback.  Pt seems motivated to learn recovery skills.  Pt did not present with any major concern for emotional issue.        Dimension 4: Treatment Acceptance / Resistance -   Previous Dimension Ratin  Current Dimension Ratin  ELIJAH Diagnosis:  Alcohol Use Disorder   303.90 (F10.20) Severe .  Stage - 1  Commitment to tx process/Stage of change- Action  ELIJAH assignments - Maintain internal motivation for recovery by attending groups and listening to fellow group members' addiction stories     Narrative - Patient seems motivated for treatment.  Pt reports there is no pressure/mandate from court/probation/legal issues and he decided to seek help on his term.  Pt stats the last use was on 1/3/2022 and has been sober for 4 days at the time of intake.   Pt verbalizes drinking as a problem and his need to quit.  He seems to be at the very early stage of Action stage in the Stage of Change       Dimension 5: Relapse / Continued Problem Potential -   Previous Dimension Ratin  Current Dimension Rating:  3  Relapses this week - None  Urges to use - None  UA results - No results found for this or any previous visit (from  "the past 168 hour(s)).    Narrative- Last use of alcohol on 1/3/2022.  This was the date of original intake but patient admits he relapsed on that day. Pt reports the longest sobriety in his lifetime as 5-6 months on his own.  He seems to have little insight into his triggers as evidenced by his statement. \"I can't put my fingers around it.\"  Patient was educated on how negative feelings impact thoughts/action in life.  Pt was educated on CBT and 10 cognitive distortion and skills to stop the negative thoughts.        Dimension 6: Recovery Environment -  Previous Dimension Ratin  Current Dimension Ratin  Family Involvement -   Summarize attendance at family groups and family sessions - N/A  Family supportive of treatment?  Yes    Community support group attendance - No  Recreational activities - exercise (30 min jogging a day)    Narrative -  Patient reports his current incomes comes from employment and investments.  Patient states he has college degree in [employer] and has been able to fulfill his occupational obligations.  He is currently living independently with his girlfriend and renting an apartment in [place].   He reports he kept his drinking to himself and none of his friends know about the depth of his addiction or the fact that his is getting treatment.  Pt states his parents and girlfriends are supportive of his recovery.   Patent does not report any past or current legal issues.  This is patient's very first ELIJAH treatment in lifetime.   Pt is not engaged in any community sober support group. Since starting treatment, pt reports he started jogging 30 min a day.      Justification for Continued Treatment at this Level of Care:  Recent alcohol use on 1/3/2022    Discharge Planning:  Target Discharge Date/Timeframe:  2022   Med Mgmt Provider/Appt:  TBD   Ind therapy Provider/Appt:  N/A   Family therapy Provider/Appt:  N/A   Other referrals:  N/A      Has vulnerable adult status " change? No    Service Coordination:  TBD    Supervision:  Every Wednesdays     Are Treatment Plan goals/objectives effective? Yes  *If no, list changes to treatment plan:    Are the current goals meeting client's needs? Yes  *If no, list the changes to treatment plan.    Client Input / Response: Patient admits his drinking became problem and expresses his desire to learn recovery skills.  Pt denies any cravings at this time.       *Client agrees with any changes to the treatment plan: N/A  *Client received copy of changes: N/A  *Client is aware of right to access a treatment plan review: Yes

## 2022-01-17 ENCOUNTER — DOCUMENTATION ONLY (OUTPATIENT)
Dept: ADDICTION MEDICINE | Facility: HOSPITAL | Age: 37
End: 2022-01-17
Payer: COMMERCIAL

## 2022-01-17 NOTE — PROGRESS NOTES
"ABSENT NOTE:    Albert Kumar was absent from group 1/17/2022 . This absence was excused. Patient emailed counselor at 10:22 AM and wrote: \"Hello, I have a few showings today being a holiday. But will see you tomorrow.\"  Patient was unable to attend due to his work.  Patient is expected to be in group on tomorrow, 1/18/2022.      RONALDO Ravi  1/17/2022 , 1:40 PM        "

## 2022-01-18 ENCOUNTER — VIRTUAL VISIT (OUTPATIENT)
Dept: ADDICTION MEDICINE | Facility: CLINIC | Age: 37
End: 2022-01-18
Attending: FAMILY MEDICINE
Payer: COMMERCIAL

## 2022-01-18 DIAGNOSIS — F10.20 ALCOHOL USE DISORDER, SEVERE, DEPENDENCE (H): Primary | ICD-10-CM

## 2022-01-18 PROCEDURE — H2035 A/D TX PROGRAM, PER HOUR: HCPCS | Mod: HQ,GT,95

## 2022-01-18 NOTE — GROUP NOTE
Video Visit:      Provider verified identity through the following two step process.  Patient provided:  Patient photo and Patient is known previously to provider    Telemedicine Visit: The patient's condition can be safely assessed and treated via synchronous audio and visual telemedicine encounter.      Reason for Telemedicine Visit: Patient has requested telehealth visit    Originating Site (Patient Location): Patient's home    Distant Site (Provider Location): [facility]: [facility]    Consent:  The patient/guardian has verbally consented to: the potential risks and benefits of telemedicine (video visit) versus in person care; bill my insurance or make self-payment for services provided; and responsibility for payment of non-covered services.     Patient would like the video invitation sent by:  Send to e-mail at: [email or web address]    Mode of Communication:  Video Conference via Medical Zoom    As the provider I attest to compliance with applicable laws and regulations related to telemedicine.Group Therapy Documentation    PATIENT'S NAME: Albert Kumar  MRN:   [id number]  :   1985  ACCT. NUMBER: [id number]  DATE OF SERVICE: 22  START TIME: 12:30 PM  END TIME:  3:30 PM  FACILITATOR(S): Nirav Castro LADC; Jacky Nash LADC  TOPIC: BEH Group Therapy  Number of patients attending the group:  3  Group Length:  3 Hours    Group Therapy Type: Psychoeducation    Summary of Group / Topics Discussed:    The Jellinek Curve for Addiction      Group Attendance:  Attended group session    Patient's response to the group topic/interactions:  cooperative with task, discussed personal experience with topic, expressed readiness to alter behaviors, expressed understanding of topic, gave appropriate feedback to peers, listened actively and offered helpful suggestions to peers    Patient appeared to be Actively participating, Attentive and Engaged.        Client specific details:   Patient reports last date of use as 01/03/2022, as says participating in group sessions seems to be helpful at maintaining sobriety. Patient reports he is not sure of the worst that happened during his active use but, he remembered blowing up relationships; and now is beginning to reconnect with friends. He reports that he is planing his days much more properly now, and is also taking time to jog outside. He rates his emotional and physical health at 8/10. Patient reports physical exercise for his mental wellbeing.

## 2022-01-19 ENCOUNTER — DOCUMENTATION ONLY (OUTPATIENT)
Dept: ADDICTION MEDICINE | Facility: HOSPITAL | Age: 37
End: 2022-01-19
Payer: COMMERCIAL

## 2022-01-19 NOTE — PROGRESS NOTES
Addiction Outpatient Weekly Clinical Staffing     Albert Kumar was staffed on 1/19/2022 . Albert Kumar was staffed on recovery strengths, barriers and treatment progress.     Staff present: RONALDO Hi, RONALDO Ravi, RONALDO Flores, Fiona Choudhury [facility], Nirav Castro [facility] and Debbie Alva [facility]    Date: 1/19/2022 Time: 2:56 PM    Staff Signature: RONALDO Ravi

## 2022-01-20 ENCOUNTER — DOCUMENTATION ONLY (OUTPATIENT)
Dept: ADDICTION MEDICINE | Facility: HOSPITAL | Age: 37
End: 2022-01-20
Payer: COMMERCIAL

## 2022-01-20 NOTE — PROGRESS NOTES
ABSENT NOTE:    Albert Kumar was absent from group 1/20/2022 . This absence was not excused. Patient emailed counselor at  12:15 PM reporting he would be slightly late for group.  Pt emailed at 12:30 PM again stating he would definitely be late for group, but he did not show up.    Patient is expected to be in group on 1/24/2022    Jacky Nash [facility]  1/20/2022 , 5:03 PM

## 2022-01-21 ENCOUNTER — DOCUMENTATION ONLY (OUTPATIENT)
Dept: ADDICTION MEDICINE | Facility: CLINIC | Age: 37
End: 2022-01-21
Payer: COMMERCIAL

## 2022-01-21 NOTE — PROGRESS NOTES
[facility] Weekly Treatment Plan Review      ATTENDANCE for the following date span:  1/15/2022 to 2022    Date 22   Group Therapy 0.0 hours 3.0 hours N/A 0.0 hours N/A   Individual Therapy Not buzz Not buzz N/A Not buzz N/A   Family Therapy Not buzz Not buzz N/A Not buzz N/A   Other (Specify) Not buzz Not buzz N/A Not buzz N/A     Patient attended 1 group session during this time period. Patient was excused from group for personal reasons on 22, and absent (unexcused) on 22      Weekly Treatment Plan Review     Treatment Plan initiated on: 2022    Dimension1: Acute Intoxication/Withdrawal Potential -   Previous Dimension Ratin  Current Dimension Ratin  Date of Last Use 1/3/2022  Any reports of withdrawal symptoms - No    Narrative: Date of last use of alcohol-1/3/2022 in the evening.  At intake, patient reported after few days of sobriety, he was experiencing jitterly hands, some anxiety, sweat.  However, patient did not report need for medical detox and states he was able to tolerate the symptoms. Since the intake, patient reports he is remaining sober and has not reported any issues with withdrawals.      Dimension 2: Biomedical Conditions & Complications -   Previous Dimension Ratin  Current Dimension Ratin  Medical Concerns:  None   Current Medications & Medication Changes:  Current Outpatient Medications   Medication     fluticasone (FLONASE) 50 MCG/ACT nasal spray     No current facility-administered medications for this visit.     Medication Prescriber:  Joel Daniel Irwin Wegener MD, [facility]  Taking meds as prescribed? Yes  Medication side effects or concerns: No  Outside medical appointments this week (list provider and reason for visit):  None     Narratives: Pt reports his medical and dental health are well at this time.  Pt has [employer] and able to access health care.  Pt has  established primary care with Dr. Wally David at [facility]. Patient continues to report good physical health.    Dimension 3: Emotional/Behavioral Conditions & Complications -   Previous Dimension Ratin  Current Dimension Ratin  PHQ9   No flowsheet data found.  GAD7     FAISAL-7 SCORE 2021   Total Score 13 (moderate anxiety)   Total Score 13     Mental health diagnosis None   Date of last SIB:  never   Date of  last SI:  never  Date of last HI: never  Behavioral Targets:  N/A  Current MH Assignments:  N/A    Narrative:  At the time of intake, patient did not report past or current mental health diagnoses.  Patient reports no current individual psychotherapy nor is he on medications. Pt reported daily heavy drinking impacting his emotional health in the past.  Patient was positive on the GAIN-SS. At the time of the comprehensive assessment there were no reports of SI/SIB/HI.  Patient attends group and contributing well by reading materials and sharing his feedback.  Pt seems motivated to learn recovery skills.  Pt has been reporting his emotional health at 8/10 on the scale of 0 to 10, and did not present with any major concern for emotional health this week.      Dimension 4: Treatment Acceptance / Resistance -   Previous Dimension Ratin  Current Dimension Ratin  ELIJAH Diagnosis:  Alcohol Use Disorder   303.90 (F10.20) Severe .  Stage - 1  Commitment to tx process/Stage of change- Action  ELIJAH assignments - Maintain internal motivation for recovery by attending groups and listening to fellow group members' addiction stories     Narrative - Patient seems motivated for treatment.  Pt reports there is no pressure/mandate from court/probation/legal issues and he decided to seek help on his term.  Pt states date of last use was on 1/3/2022 and had been sober for 4 days prior to the time of intake.   Pt verbalizes drinking as a problem and his need to quit.  He seems to be in  "early Action stage in the Stages of Change. 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 He has completed 8 hours of group at this time.      Dimension 5: Relapse / Continued Problem Potential -   Previous Dimension Ratin  Current Dimension Rating:  3  Relapses this week - None  Urges to use - None  UA results - No results found for this or any previous visit (from the past 168 hour(s)).    Narrative- Last use of alcohol on 1/3/2022.  This was the date of original intake but patient admits he relapsed on that day. Pt reports the longest sobriety in his lifetime as 5-6 months on his own.  He seems to have little insight into his triggers as evidenced by his statement. \"I can't put my fingers around it.\"  Patient was educated on how negative feelings impact thoughts/action in life.  Pt was educated on CBT and 10 cognitive distortion and skills to stop the negative thoughts last week. This week, focus was on the Jellinek Curve and how alcoholism progresses.        Dimension 6: Recovery Environment -  Previous Dimension Ratin  Current Dimension Ratin  Family Involvement -   Summarize attendance at family groups and family sessions - N/A  Family supportive of treatment?  Yes    Community support group attendance - No  Recreational activities - exercise (30 min jogging a day)    Narrative -  Patient reports his current incomes comes from employment and investments.  Patient states he has college degree in [employer] and has been able to fulfill his occupational obligations.  He is currently living independently with his girlfriend and renting an apartment in [place].   He reports he kept his drinking to himself and none of his friends know about the depth of his addiction or the fact that he is getting treatment.  Pt states his parents and girlfriends are supportive of his recovery.   Patent does not " report any past or current legal issues.  This is patient's very first ELIJAH treatment in lifetime.   Pt is not engaged in any community sober support group. Since starting treatment, pt reports he started jogging 30 min a day. Patient does not report any changes in this area this week.     Justification for Continued Treatment at this Level of Care:  Recent alcohol use on 1/3/2022    Discharge Planning:  Target Discharge Date/Timeframe:  7/7/2022   Med Mgmt Provider/Appt:  MARYCRUZ   Ind therapy Provider/Appt:  N/A   Family therapy Provider/Appt:  N/A   Other referrals:  N/A      Has vulnerable adult status change? No    Service Coordination:  TBD    Supervision:  Every Wednesdays     Are Treatment Plan goals/objectives effective? Yes  *If no, list changes to treatment plan:    Are the current goals meeting client's needs? Yes  *If no, list the changes to treatment plan.    Client Input / Response: Patient admits his drinking became problem and expresses his desire to learn recovery skills.  Pt denies any cravings at this time.       *Client agrees with any changes to the treatment plan: N/A  *Client received copy of changes: N/A  *Client is aware of right to access a treatment plan review: Yes

## 2022-01-24 ENCOUNTER — VIRTUAL VISIT (OUTPATIENT)
Dept: ADDICTION MEDICINE | Facility: CLINIC | Age: 37
End: 2022-01-24
Attending: FAMILY MEDICINE
Payer: COMMERCIAL

## 2022-01-24 DIAGNOSIS — F10.20 ALCOHOL USE DISORDER, SEVERE, DEPENDENCE (H): Primary | ICD-10-CM

## 2022-01-24 PROCEDURE — H2035 A/D TX PROGRAM, PER HOUR: HCPCS | Mod: HQ,GT,95

## 2022-01-24 NOTE — GROUP NOTE
Video Visit:      Provider verified identity through the following two step process.  Patient provided:  Patient photo and Patient is known previously to provider    Telemedicine Visit: The patient's condition can be safely assessed and treated via synchronous audio and visual telemedicine encounter.      Reason for Telemedicine Visit: Patient has requested telehealth visit    Originating Site (Patient Location): Patient's home    Distant Site (Provider Location): [facility]: [facility]    Consent:  The patient/guardian has verbally consented to: the potential risks and benefits of telemedicine (video visit) versus in person care; bill my insurance or make self-payment for services provided; and responsibility for payment of non-covered services.     Patient would like the video invitation sent by:  Send to e-mail at: [email or web address]    Mode of Communication:  Video Conference via Medical Zoom    As the provider I attest to compliance with applicable laws and regulations related to telemedicine.Group Therapy Documentation    PATIENT'S NAME: Albert Kumar  MRN:   [id number]  :   1985  ACCT. NUMBER: [id number]  DATE OF SERVICE: 22  START TIME: 12:30 PM  END TIME:  3:20 PM  FACILITATOR(S): Nirav Castro LADC; Jacky Nash LADC  TOPIC: BEH Group Therapy  Number of patients attending the group:  3  Group Length:  3 Hours    Group Therapy Type: Psychoeducation    Summary of Group / Topics Discussed:    Group Discussion on Wellness      Group Attendance:  Attended group session    Patient's response to the group topic/interactions:  cooperative with task, discussed personal experience with topic, expressed readiness to alter behaviors, expressed understanding of topic, gave appropriate feedback to peers, listened actively and offered helpful suggestions to peers    Patient appeared to be Actively participating, Attentive and Engaged.        Client specific details:   Patient reports being sober for about two weeks, and rated his emotional and physical health at 8/10, on the scale of 0 to 10. Patient reports going to the gym for physical exercise and running when the conditions are favorable. Patient reports he is staying sober by taking things day-by-day. He reports family and friendships as his most important values. Patient reports his goal for this week is to continue to exercise and planning a trip for February.

## 2022-01-25 ENCOUNTER — DOCUMENTATION ONLY (OUTPATIENT)
Dept: ADDICTION MEDICINE | Facility: HOSPITAL | Age: 37
End: 2022-01-25
Payer: COMMERCIAL

## 2022-01-25 NOTE — PROGRESS NOTES
ABSENT NOTE:    Albert Kumar was absent from group 1/25/2022 . This absence was not excused.  Patient emailed counselor right  before the group start time stating he would b late for group today.  There was no email notification about patient attempting to enter Zoom meeting today during the group time.  Pt is expected to attend group on 1/27/2022.    RONALDO Ravi  1/25/2022 , 3:19 PM

## 2022-01-27 ENCOUNTER — DOCUMENTATION ONLY (OUTPATIENT)
Dept: ADDICTION MEDICINE | Facility: CLINIC | Age: 37
End: 2022-01-27
Payer: COMMERCIAL

## 2022-01-27 NOTE — PROGRESS NOTES
ABSENT NOTE:    Albert Kumar was absent from group 1/27/2022 . This absence was not excused. This writer attempted to contact patient via telephone but patient did not answer. Patient is expected to be in group on 01/31/2022.     Nirav Castro, [facility]  1/27/2022 , 1:51 PM

## 2022-01-28 ENCOUNTER — DOCUMENTATION ONLY (OUTPATIENT)
Dept: ADDICTION MEDICINE | Facility: HOSPITAL | Age: 37
End: 2022-01-28
Payer: COMMERCIAL

## 2022-01-28 NOTE — PROGRESS NOTES
[facility] Weekly Treatment Plan Review      ATTENDANCE for the following date span:  2022 to 2022    Date 22   Group Therapy 3  hours 0 hours N/A 0.0 hours N/A   Individual Therapy Not buzz Not buzz N/A Not buzz N/A   Family Therapy Not buzz Not buzz N/A Not buzz N/A   Other (Specify) Not buzz Not buzz N/A Not buzz N/A     Patient attended 1 group session during this time period. Patient was absent on  and .      Weekly Treatment Plan Review     Treatment Plan initiated on: 2022    Dimension1: Acute Intoxication/Withdrawal Potential -   Previous Dimension Ratin  Current Dimension Ratin  Date of Last Use 1/3/2022  Any reports of withdrawal symptoms - No    Narrative: Date of last use of alcohol-1/3/2022 in the evening.  At intake, patient reported after few days of sobriety, he was experiencing jitterly hands, some anxiety, sweat.  However, patient did not report need for medical detox and states he was able to tolerate the symptoms. Since the intake, patient reports he is remaining sober and has not reported any issues with withdrawals.      Dimension 2: Biomedical Conditions & Complications -   Previous Dimension Ratin  Current Dimension Ratin  Medical Concerns:  None   Current Medications & Medication Changes:  Current Outpatient Medications   Medication     fluticasone (FLONASE) 50 MCG/ACT nasal spray     No current facility-administered medications for this visit.     Medication Prescriber:  Joel Daniel Irwin Wegener MD, [facility]  Taking meds as prescribed? Yes  Medication side effects or concerns: No  Outside medical appointments this week (list provider and reason for visit):  None     Narratives: Pt reports his medical and dental health are well at this time.  Pt has [employer] and able to access health care.  Pt has established primary care with Dr. Wally David at   [facility]. Patient continues to report good physical health.    Dimension 3: Emotional/Behavioral Conditions & Complications -   Previous Dimension Ratin  Current Dimension Ratin  PHQ9   No flowsheet data found.  GAD7     FAISAL-7 SCORE 2021   Total Score 13 (moderate anxiety)   Total Score 13     Mental health diagnosis None   Date of last SIB:  never   Date of  last SI:  never  Date of last HI: never  Behavioral Targets:  N/A  Current MH Assignments:  N/A    Narrative:  At the time of intake, patient did not report past or current mental health diagnoses.  Patient reports no current individual psychotherapy nor is he on medications. Patient had two absences this week and his absences seems to be increasing.  Pt is urged to schedule an individual session at this time.      Dimension 4: Treatment Acceptance / Resistance -   Previous Dimension Ratin  Current Dimension Ratin  ELIJAH Diagnosis:  Alcohol Use Disorder   303.90 (F10.20) Severe .  Stage - 1  Commitment to tx process/Stage of change- Action  ELIJAH assignments - Maintain internal motivation for recovery by attending groups and listening to fellow group members' addiction stories     Narrative - Patient seems motivated for treatment.  Pt reports there is no pressure/mandate from court/probation/legal issues and he decided to seek help on his term.  Pt states date of last use was on 1/3/2022 and had been sober for 4 days prior to the time of intake.   Pt verbalizes drinking as a problem and his need to quit.      Dimension 5: Relapse / Continued Problem Potential -   Previous Dimension Ratin  Current Dimension Rating:  3  Relapses this week - None  Urges to use - None  UA results - No results found for this or any previous visit (from the past 168 hour(s)).    Narrative- Last use of alcohol on 1/3/2022.  This was the date of original intake but patient admits he relapsed on that day. Pt reports the longest sobriety in his  "lifetime as 5-6 months on his own.  He seems to have little insight into his triggers as evidenced by his statement. \"I can't put my fingers around it.\"  Pt is starting to have attendance issues. Pt is urged to schedule an individual session.       Dimension 6: Recovery Environment -  Previous Dimension Ratin  Current Dimension Ratin  Family Involvement -   Summarize attendance at family groups and family sessions - N/A  Family supportive of treatment?  Yes    Community support group attendance - No  Recreational activities - exercise (30 min jogging a day)    Narrative -  Patient reports his current incomes comes from employment and investments. 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   Patient states he has college degree in [email or web address] and has been able to fulfill his occupational obligations.  He is currently living independently with his girlfriend and renting an apartment in [place].   He reports he kept his drinking to himself and none of his friends know about the depth of his addiction or the fact that he is getting treatment.  Pt states his parents and girlfriends are supportive of his recovery.   Patent does not report any past or current legal issues.  This is patient's very first ELIJAH treatment in lifetime.   Pt is not engaged in any community sober support group. Since starting treatment, pt reports he started jogging 30 min a day. Patient does not report any changes in this area this week.     Justification for Continued Treatment at this Level of Care:  Recent alcohol use on 1/3/2022    Discharge Planning:  Target Discharge Date/Timeframe:  2022   Med Mgmt Provider/Appt:  MARYCRUZ   Ind therapy Provider/Appt:  N/A   Family therapy Provider/Appt:  N/A   Other referrals:  N/A      Has vulnerable adult status change? No    Service Coordination:  TBD    Supervision:  Every      Are Treatment Plan goals/objectives effective? Yes  *If no, list changes to treatment plan:    Are the " current goals meeting client's needs? Yes  *If no, list the changes to treatment plan.    Client Input / Response: Patient admits his drinking became problem and expresses his desire to learn recovery skills.  Pt denies any cravings at this time.       *Client agrees with any changes to the treatment plan: N/A  *Client received copy of changes: N/A  *Client is aware of right to access a treatment plan review: Yes

## 2022-01-31 ENCOUNTER — DOCUMENTATION ONLY (OUTPATIENT)
Dept: ADDICTION MEDICINE | Facility: HOSPITAL | Age: 37
End: 2022-01-31

## 2022-01-31 ENCOUNTER — VIRTUAL VISIT (OUTPATIENT)
Dept: FAMILY MEDICINE | Facility: CLINIC | Age: 37
End: 2022-01-31
Payer: COMMERCIAL

## 2022-01-31 DIAGNOSIS — F33.2 SEVERE EPISODE OF RECURRENT MAJOR DEPRESSIVE DISORDER, WITHOUT PSYCHOTIC FEATURES (H): Primary | ICD-10-CM

## 2022-01-31 DIAGNOSIS — F10.288 ALCOHOL DEPENDENCE WITH OTHER ALCOHOL-INDUCED DISORDER (H): ICD-10-CM

## 2022-01-31 PROCEDURE — 99213 OFFICE O/P EST LOW 20 MIN: CPT | Mod: 95 | Performed by: FAMILY MEDICINE

## 2022-01-31 RX ORDER — BUPROPION HYDROCHLORIDE 75 MG/1
75 TABLET ORAL 2 TIMES DAILY
Qty: 60 TABLET | Refills: 1 | Status: SHIPPED | OUTPATIENT
Start: 2022-01-31 | End: 2022-04-02

## 2022-01-31 ASSESSMENT — ANXIETY QUESTIONNAIRES
3. WORRYING TOO MUCH ABOUT DIFFERENT THINGS: NEARLY EVERY DAY
1. FEELING NERVOUS, ANXIOUS, OR ON EDGE: NEARLY EVERY DAY
GAD7 TOTAL SCORE: 21
5. BEING SO RESTLESS THAT IT IS HARD TO SIT STILL: NEARLY EVERY DAY
2. NOT BEING ABLE TO STOP OR CONTROL WORRYING: NEARLY EVERY DAY
7. FEELING AFRAID AS IF SOMETHING AWFUL MIGHT HAPPEN: NEARLY EVERY DAY
6. BECOMING EASILY ANNOYED OR IRRITABLE: NEARLY EVERY DAY
IF YOU CHECKED OFF ANY PROBLEMS ON THIS QUESTIONNAIRE, HOW DIFFICULT HAVE THESE PROBLEMS MADE IT FOR YOU TO DO YOUR WORK, TAKE CARE OF THINGS AT HOME, OR GET ALONG WITH OTHER PEOPLE: SOMEWHAT DIFFICULT

## 2022-01-31 ASSESSMENT — PATIENT HEALTH QUESTIONNAIRE - PHQ9
SUM OF ALL RESPONSES TO PHQ QUESTIONS 1-9: 23
5. POOR APPETITE OR OVEREATING: NEARLY EVERY DAY

## 2022-01-31 NOTE — PROGRESS NOTES
Albert is a 37 year old who is being evaluated via a billable video visit.      Patient is a 37-year-old male presenting with depression and anxiety and alcohol dependence.  He reports that he has been abusing alcohol for many years but lately it has become excessive . He reports that he uses the alcohol to deal with the depression. He reports that he has not been on medication for the depression in the past and he is open to trying medication at this time.    How would you like to obtain your AVS? MyChart  If the video visit is dropped, the invitation should be resent by: Text to cell phone: [phone]  Will anyone else be joining your video visit? No    Video Start Time: 4:40 PM    Assessment & Plan     (F33.2) Severe episode of recurrent major depressive disorder, without psychotic features (H)  (primary encounter diagnosis)  Comment: medication faxed, recommend seeing psychiatry for medication management.  Plan: buPROPion (WELLBUTRIN) 75 MG tablet, Adult         Mental Health  Referral            (F10.288) Alcohol dependence with other alcohol-induced disorder (H)  Comment: referral placed.  Plan: Adult Mental Health  Referral    Depression Screening Follow Up    PHQ 1/31/2022   PHQ-9 Total Score 23   Q9: Thoughts of better off dead/self-harm past 2 weeks Several days     Last PHQ-9 1/31/2022   1.  Little interest or pleasure in doing things 3   2.  Feeling down, depressed, or hopeless 2   3.  Trouble falling or staying asleep, or sleeping too much 3   4.  Feeling tired or having little energy 3   5.  Poor appetite or overeating 3   6.  Feeling bad about yourself 3   7.  Trouble concentrating 3   8.  Moving slowly or restless 2   Q9: Thoughts of better off dead/self-harm past 2 weeks 1   PHQ-9 Total Score 23   Difficulty at work, home, or with people Very difficult         No flowsheet data found.      Follow Up      Follow Up Actions Taken  Crisis resource information provided in the After  Visit Summary    Discussed the following ways the patient can remain in a safe environment:  remove alcohol, remove drugs and be around others  FUTURE APPOINTMENTS:       - Follow-up visit in one month or sooner as needed.    Return in about 4 weeks (around 2/28/2022) for Follow up.    Deawyne Cohn MD  [facility]    Nitza Price is a 37 year old who presents for the following health issues     HPI     Abnormal Mood Symptoms  Onset/Duration: long time but recently gotten worse  Description: says he is good at hiding his symptoms from others. Has been using alcohol to cope with symptoms.  Depression (if yes, do PHQ-9): YES  Anxiety (if yes, do FAISAL-7): YES  Accompanying Signs & Symptoms:  Still participating in activities that you used to enjoy: no  Fatigue: YES  Irritability: YES  Difficulty concentrating: YES  Changes in appetite: YES  Problems with sleep: YES- not really sleeping well, has been waking up 3-4 times a night. Sometimes it takes him 5-10 ,minutes to fall back asleep, sometimes up to an hour or more.  Heart racing/beating fast: YES  Abnormally elevated, expansive, or irritable mood: YES  Persistently increased activity or energy: YES  Thoughts of hurting yourself or others: YES  History:  Recent stress or major life event: yes, girlfriend told him he needs to pack his bags in the last week because of his anxiety/depression symptoms  Prior depression or anxiety: says he's had depression and anxiety   Family history of depression or anxiety: YES- his sister and a few uncles  Alcohol/drug use: YES- using large amounts of alcohol to cope with depression/anxiety symptoms  Difficulty sleeping: YES  Precipitating or alleviating factors: None  Therapies tried and outcome:  individual therapy with Jacky Nash but it doesn't seem like it is effective     PHQ 1/31/2022   PHQ-9 Total Score 23   Q9: Thoughts of better off dead/self-harm past 2 weeks Several days      FAISAL-7 SCORE 12/16/2021 1/31/2022   Total Score 13 (moderate anxiety) -   Total Score - 21   Some encounter information is confidential and restricted. Go to Review Flowsheets activity to see all data.         Review of Systems         Objective           Vitals:  No vitals were obtained today due to virtual visit.    Physical Exam   GENERAL: Healthy, alert and no distress  EYES: Eyes grossly normal to inspection.  No discharge or erythema, or obvious scleral/conjunctival abnormalities.  RESP: No audible wheeze, cough, or visible cyanosis.  No visible retractions or increased work of breathing.    SKIN: Visible skin clear. No significant rash, abnormal pigmentation or lesions.  PSYCH: affect flat            Video-Visit Details    Type of service:  Video Visit    Video End Time:5:02 PM    Originating Location (pt. Location): Home    Distant Location (provider location):  [facility]     Platform used for Video Visit: "[employer]

## 2022-01-31 NOTE — PROGRESS NOTES
"ABSENT NOTE:    Albert Kumar was absent frOm group 1/31/2022 . This absence was excused. Patient reached out to counselors via email:       \"I will not be attending todays session. I have an appointment at [place] [address]  to see about being prescribed something to help with anxiety/depression, or whatever it is  that make me want to drink all day, instead of what I am supposed to do both professionally  and personally. My appointment is with Dr. Dewayne Cohn, and I have informed  them that I have been attending your sessions. Her phone number is  if you  need to speak to them       Sorry for late notice, as well as missing Thursdays session.\"    Patient is expected to be in group on 2/1/2022    Jacky Nash [facility]  1/31/2022 , 1:41 PM        "

## 2022-02-01 ENCOUNTER — DOCUMENTATION ONLY (OUTPATIENT)
Dept: ADDICTION MEDICINE | Facility: HOSPITAL | Age: 37
End: 2022-02-01
Payer: COMMERCIAL

## 2022-02-01 ASSESSMENT — ANXIETY QUESTIONNAIRES: GAD7 TOTAL SCORE: 21

## 2022-02-01 NOTE — PROGRESS NOTES
ABSENT NOTE:    Albert Kumar was absent from group 2/1/2022 . This absence was not excused. Pt di not show up to group as expected.  This writer emailed the patient and asked how he was doing and whether he is still planning on attending group.   Patient is expected to be in group on 2/3/2022.      Jacky Nash, [facility]  2/1/2022 , 2:08 PM

## 2022-02-02 ENCOUNTER — DOCUMENTATION ONLY (OUTPATIENT)
Dept: ADDICTION MEDICINE | Facility: HOSPITAL | Age: 37
End: 2022-02-02
Payer: COMMERCIAL

## 2022-02-02 NOTE — PROGRESS NOTES
"Late Entry from 2/1/2022    Patient responded to this writer's email from earlier today asking why he was not in group today.  Pt responded at 5 pm with \"I m sorry. Did I thought I had sent an email. I had a work meeting from 1-4. Just got home now.\"  This writer asked him if he is able to attend tomorrow's group    Jacky Nash MA [facility] 2/2/2022  10:30 am      "

## 2022-02-03 ENCOUNTER — VIRTUAL VISIT (OUTPATIENT)
Dept: ADDICTION MEDICINE | Facility: CLINIC | Age: 37
End: 2022-02-03
Attending: FAMILY MEDICINE
Payer: COMMERCIAL

## 2022-02-03 DIAGNOSIS — F10.20 ALCOHOL USE DISORDER, SEVERE, DEPENDENCE (H): Primary | ICD-10-CM

## 2022-02-03 PROCEDURE — H2035 A/D TX PROGRAM, PER HOUR: HCPCS | Mod: HQ,GT,95

## 2022-02-04 ENCOUNTER — DOCUMENTATION ONLY (OUTPATIENT)
Dept: ADDICTION MEDICINE | Facility: CLINIC | Age: 37
End: 2022-02-04
Payer: COMMERCIAL

## 2022-02-04 NOTE — PROGRESS NOTES
[facility] Weekly Treatment Plan Review      ATTENDANCE for the following date span:  2022 to 2022    Date 22   Group Therapy 0.0  hours 0.0 hours N/A 3.0 hours N/A   Individual Therapy Not buzz Not buzz N/A Not buzz N/A   Family Therapy Not buzz Not buzz N/A Not buzz N/A   Other (Specify) Not buzz Not buzz N/A Not buzz N/A     Patient attended 1 group session during this time period. Patient was excused on  and  to attend business appointments.      Weekly Treatment Plan Review     Treatment Plan initiated on: 2022    Dimension1: Acute Intoxication/Withdrawal Potential -   Previous Dimension Ratin  Current Dimension Ratin  Date of Last Use 1/3/2022  Any reports of withdrawal symptoms - No    Narrative: Date of last use of alcohol-1/3/2022 in the evening.  At intake, patient reported after few days of sobriety, he was experiencing jitterly hands, some anxiety, sweat.  However, patient did not report need for medical detox and states he was able to tolerate the symptoms. Since the intake, patient reports he is remaining sober and has not reported any issues with withdrawals.      Dimension 2: Biomedical Conditions & Complications -   Previous Dimension Ratin  Current Dimension Ratin  Medical Concerns:  None   Current Medications & Medication Changes:  Current Outpatient Medications   Medication     buPROPion (WELLBUTRIN) 75 MG tablet     fluticasone (FLONASE) 50 MCG/ACT nasal spray     No current facility-administered medications for this visit.     Medication Prescriber:  Joel Daniel Irwin Wegener MD, [facility]  Taking meds as prescribed? Yes  Medication side effects or concerns: No  Outside medical appointments this week (list provider and reason for visit):  None     Narratives: Pt reports his medical and dental health are well at this time.  Pt has [employer] and able to access [employer]  care.  Pt has established primary care with Dr. Wally David at [facility]. Patient continues to report good physical health, with no changes.    Dimension 3: Emotional/Behavioral Conditions & Complications -   Previous Dimension Ratin  Current Dimension Ratin  PHQ9     PHQ-9 SCORE 2022   PHQ-9 Total Score 23     GAD7     FAISAL-7 SCORE 2021   Total Score 13 (moderate anxiety) -   Total Score 13 21     Mental health diagnosis None   Date of last SIB:  never   Date of  last SI:  never  Date of last HI: never  Behavioral Targets:  N/A  Current MH Assignments:  N/A    Narrative:  At the time of intake, patient did not report past or current mental health diagnoses.  Patient reports no current individual psychotherapy nor is he on medications. 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     Dimension 4: Treatment Acceptance / Resistance -   Previous Dimension Ratin  Current Dimension Ratin  ELIJAH Diagnosis:  Alcohol Use Disorder   303.90 (F10.20) Severe .  Stage - 1  Commitment to tx process/Stage of change- Action  ELIJAH assignments - Maintain internal motivation for recovery by attending groups and listening to fellow group members' addiction stories     Narrative - Patient seems motivated for treatment.  Pt reports there is no pressure/mandate from court/probation/legal issues and he decided to seek help on his term.  Pt states date of last use was on 1/3/2022 and had been sober for 4 days prior to the time of intake.   Pt verbalizes drinking as a problem and his need to quit. This week, discussions were focused on How to manage anxiety, Good-by Letter, and Meditation. Patient has completed 20 hours of ELIJAH treatment at this time.    Dimension 5: Relapse / Continued Problem Potential -   Previous Dimension Ratin  Current Dimension Rating:  3  Relapses this week - None  Urges to use - None  UA results - No results  "found for this or any previous visit (from the past 168 hour(s)).    Narrative- Last use of alcohol on 1/3/2022.  This was the date of original intake but patient admits he relapsed on that day. Pt reports the longest sobriety in his lifetime as 5-6 months on his own.  He seems to have little insight into his triggers as evidenced by his statement. \"I can't put my fingers around it.\" This week, patient reports he has quit his part-time  job to avoid being close to alcohol.       Dimension 6: Recovery Environment -  Previous Dimension Ratin  Current Dimension Ratin  Family Involvement -   Summarize attendance at family groups and family sessions - N/A  Family supportive of treatment?  Yes    Community support group attendance - No  Recreational activities - exercise (30 min jogging a day)    Narrative -  Patient reports his current incomes comes from employment and investments. 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   Patient states he has college degree in [employer] and has been able to fulfill his occupational obligations.  He is currently living independently with his girlfriend and renting an apartment in [place]. Patient reports he kept his drinking to himself and none of his friends know about the depth of his addiction or the fact that he is getting treatment.  Pt states his parents and girlfriends are supportive of his recovery.   Patent does not report any past or current legal issues.  This is patient's very first ELIJAH treatment in lifetime.   Pt is not engaged in any community sober support group; however emphasizes staying busy with work, and jogging for at least 30 minutes a day when possible.     Justification for Continued Treatment at this Level of Care:  Recent alcohol use on 1/3/2022    Discharge Planning:  Target Discharge Date/Timeframe:  2022   Med Mgmt Provider/Appt:  TBD   Ind therapy Provider/Appt:  N/A   Family therapy Provider/Appt:  N/A   Other referrals:  " N/A      Has vulnerable adult status change? No    Service Coordination:  TBD    Supervision:  Every Wednesdays     Are Treatment Plan goals/objectives effective? Yes  *If no, list changes to treatment plan:    Are the current goals meeting client's needs? Yes  *If no, list the changes to treatment plan.    Client Input / Response: Patient admits his drinking became problem and expresses his desire to learn recovery skills.  Pt denies any cravings at this time.       *Client agrees with any changes to the treatment plan: N/A  *Client received copy of changes: N/A  *Client is aware of right to access a treatment plan review: Yes

## 2022-02-07 ENCOUNTER — DOCUMENTATION ONLY (OUTPATIENT)
Dept: ADDICTION MEDICINE | Facility: HOSPITAL | Age: 37
End: 2022-02-07
Payer: COMMERCIAL

## 2022-02-07 NOTE — PROGRESS NOTES
ABSENT NOTE:    Albert Kumar was absent from group 2/7/2022 . This absence was excused. Patient reported difficulty with getting into group with new Zoom ID.  Counselor called the patient and he tried to get int but he did not make it.  Patient is expected to be in group on 2/8/2022    RONALDO Ravi  2/7/2022 , 1:52 PM

## 2022-02-08 ENCOUNTER — DOCUMENTATION ONLY (OUTPATIENT)
Dept: ADDICTION MEDICINE | Facility: HOSPITAL | Age: 37
End: 2022-02-08
Payer: COMMERCIAL

## 2022-02-08 NOTE — PROGRESS NOTES
Contacting the Patient and requesting individual session    D)After discussing patient's current group attendance issue danishe supervisor and counselors, it was suggested to have an individual session with the patient.   A) This writer contacted the patient about our concern on his lack of group attendance and need to address this issue in individual session.    T)Patient responded and said he was planning on attending Thursday group and he would do the individual session before the group at 11:30 am.     Jacky Nash MA [facility] 2/8/2022 3:30 pm

## 2022-02-09 ENCOUNTER — DOCUMENTATION ONLY (OUTPATIENT)
Dept: ADDICTION MEDICINE | Facility: HOSPITAL | Age: 37
End: 2022-02-09
Payer: COMMERCIAL

## 2022-02-09 NOTE — PROGRESS NOTES
Addiction Outpatient Weekly Clinical Staffing     Albert Kumar was staffed on 2/9/2022 . Albert Kumar was staffed on recovery strengths, barriers and treatment progress.     Staff present: Kimberly Bright [facility], KANG Ravi, Erma Andrea [facility], Fiona Choudhury [facility], [facility], Nirav Castro [facility], Debbie Alva [facility] and Donald Welander, [facility]    Date: 2/9/2022 Time: 3:52 PM    Staff Signature: RONALDO Ravi

## 2022-02-10 ENCOUNTER — VIRTUAL VISIT (OUTPATIENT)
Dept: ADDICTION MEDICINE | Facility: CLINIC | Age: 37
End: 2022-02-10
Attending: FAMILY MEDICINE
Payer: COMMERCIAL

## 2022-02-10 ENCOUNTER — DOCUMENTATION ONLY (OUTPATIENT)
Dept: ADDICTION MEDICINE | Facility: HOSPITAL | Age: 37
End: 2022-02-10
Payer: COMMERCIAL

## 2022-02-10 DIAGNOSIS — F10.20 ALCOHOL USE DISORDER, SEVERE, DEPENDENCE (H): Primary | ICD-10-CM

## 2022-02-10 DIAGNOSIS — F10.20 ALCOHOL USE DISORDER, SEVERE, DEPENDENCE (H): ICD-10-CM

## 2022-02-10 PROCEDURE — H2035 A/D TX PROGRAM, PER HOUR: HCPCS | Mod: HQ,GT,95

## 2022-02-10 PROCEDURE — H2035 A/D TX PROGRAM, PER HOUR: HCPCS | Mod: GT,95

## 2022-02-10 NOTE — PROGRESS NOTES
Follow up from today's earlier individual session     D)This writer was notified by outpatient  of no openings for the morning or the evening program.   A) Patient was updated on this.  Pt states he will remain in the current Day outpatient/ Service Coordination program and attend 2 groups per week on Tuesdays and Thursdays.  Pt agreed to also start attending AA meetings.    T)Patient will be leaving out of town beginning this Saturday 2/12.  He will start this modified group attendance plan on Thursday 2/17.      Jacky Nash [facility] 2/10/2022  2:30 PM

## 2022-02-10 NOTE — NURSING NOTE
Individual Session     D)counselors met with the patient for 40 min via Zoom to discuss patient's group attendance issues and future plan.  Session started at 11:30 am and ended 12:10 PM.    A)Pt apologized for not being consistent with group attendnace.  Pt states his last use of alcohol has not changed  (1/3/2022) and he is staying sober by going to gym, working and surrounding himself with supportive people (parnets, girlfriend, some friends).  This is his very first treatment in lifetime, and he states he is actually enjoying and getting something out of groups when he is attending.  Pt states he likes listening to older group members and how their lives changed due to alcoholism.  Pt states those stories are good reminder for him to quit now instead of when he is older.   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  Pt asked if there are groups at different times, preferably in the evening.  We discussed what day and evening program are like.  Patient states he is going to ski trip beginning this Saturday and coming back on next Wednesday.  Pt was informed that the counselors will ask if other program have opening at this time.  We also dicussed alternative.  Pt states he can commit to 2 groups/week at the current group.  Counselor suggested adding AA meetings as well.   Pt states having good family support have been helpful staying sober.  Pt was asked if he has any sober plan in place before his trip to [place].  Pt seemed he had no plan.  Pt was suggested to explore local AA meeting and attend at least one.      Video Visit:      Provider verified identity through the following two step process.  Patient provided:  Patient is known previously to provider    Telemedicine Visit: The patient's condition can be safely assessed and treated via synchronous audio and visual telemedicine encounter.      Reason for Telemedicine Visit: Patient has  requested telehealth visit    Originating Site (Patient Location): Patient's home    Distant Site (Provider Location): Provider Remote Setting- Home Office    Consent:  The patient/guardian has verbally consented to: the potential risks and benefits of telemedicine (video visit) versus in person care; bill my insurance or make self-payment for services provided; and responsibility for payment of non-covered services.     Patient would like the video invitation sent by:  Send to e-mail at: [email or web address]    Mode of Communication:  Video Conference via Medical Zoom    As the provider I attest to compliance with applicable laws and regulations related to telemedicine.

## 2022-02-10 NOTE — GROUP NOTE
Video Visit: ELIJAH Psychoeducation Group       Provider verified identity through the following two step process.  Patient provided:  Patient photo and Patient is known previously to provider    Telemedicine Visit: The patient's condition can be safely assessed and treated via synchronous audio and visual telemedicine encounter.      Reason for Telemedicine Visit: Patient has requested telehealth visit    Originating Site (Patient Location): Patient's home    Distant Site (Provider Location): [facility]: [facility]    Consent:  The patient/guardian has verbally consented to: the potential risks and benefits of telemedicine (video visit) versus in person care; bill my insurance or make self-payment for services provided; and responsibility for payment of non-covered services.     Patient would like the video invitation sent by:  Send to e-mail at: [email or web address]    Mode of Communication:  Video Conference via Medical Zoom    As the provider I attest to compliance with applicable laws and regulations related to telemedicine.Group Therapy Documentation    PATIENT'S NAME: Albert Kumar  MRN:   [id number]  :   1985  ACCT. NUMBER: [id number]  DATE OF SERVICE: 2/10/22  START TIME: 12:30 PM  END TIME:  3:20 PM  FACILITATOR(S): Nirav Castro LADC; Jacky Nash LADC  TOPIC: BEH Group Therapy  Number of patients attending the group:  4  Group Length:  3 Hours    Group Therapy Type: Psychoeducation    Summary of Group / Topics Discussed:    Eight Dimensions of Wellness-Review      Group Attendance:  Attended group session    Patient's response to the group topic/interactions:  cooperative with task, discussed personal experience with topic, expressed readiness to alter behaviors, expressed understanding of topic, gave appropriate feedback to peers and listened actively    Patient appeared to be Actively participating, Attentive and Engaged.        Client specific details:  Patient  "maintains last date of use as 1/4/2022, and denies any withdrawal concerns at this time. He reports maintaining his sobriety by using the skills he has learned from group members and daily physical exercises. Patient reports support from his family and his girlfriend. He reports a planned skiing trip to [place] from Saturday, 2/12/2022 to Wednesday, 2/16/2022. Patient reports that he has a lot of resources he needs for his recovery and states his strength as, \"family support and I am humble enough to know that I am not perfect.\" Patient also reported his weakness as, \" I can be selfish sometimes.\"  Patient reports plan for the weekend is to have lots of fun in [place], while staying sober.      "

## 2022-02-11 ENCOUNTER — DOCUMENTATION ONLY (OUTPATIENT)
Dept: ADDICTION MEDICINE | Facility: HOSPITAL | Age: 37
End: 2022-02-11
Payer: COMMERCIAL

## 2022-02-11 NOTE — PROGRESS NOTES
[facility] Weekly Treatment Plan Review      ATTENDANCE for the following date span:  2022 to 2022    Date Monday 2/7/22 Tuesday 02/08/22 Wednesday 02/09/22 Thursday 02/10/22 Friday 2022   Group Therapy 0.0  hours 0.0 hours N/A 3.0 hours N/A   Individual Therapy Not buzz Not buzz N/A 1 hour  N/A   Family Therapy Not buzz Not buzz N/A Not buzz N/A   Other (Specify) Not buzz Not buzz N/A Not buzz N/A     Patient attended 1 group session and 1 individual session during this time period. Patient was excused on  and  due to work conflict.     Weekly Treatment Plan Review     Treatment Plan initiated on: 2022    Dimension1: Acute Intoxication/Withdrawal Potential -   Previous Dimension Ratin  Current Dimension Ratin  Date of Last Use 1/3/2022  Any reports of withdrawal symptoms - No    Narrative: Date of last use of alcohol-1/3/2022 in the evening.  At intake, patient reported after few days of sobriety, he was experiencing jitterly hands, some anxiety, sweat.  However, patient did not report need for medical detox and states he was able to tolerate the symptoms. Since the intake, patient reports he is remaining sober and has not reported any issues with withdrawals.      Dimension 2: Biomedical Conditions & Complications -   Previous Dimension Ratin  Current Dimension Ratin  Medical Concerns:  None   Current Medications & Medication Changes:  Current Outpatient Medications   Medication     buPROPion (WELLBUTRIN) 75 MG tablet     fluticasone (FLONASE) 50 MCG/ACT nasal spray     No current facility-administered medications for this visit.     Medication Prescriber:  Joel Daniel Irwin Wegener MD, [facility]  Taking meds as prescribed? Yes  Medication side effects or concerns: No  Outside medical appointments this week (list provider and reason for visit):  None     Narratives: Pt reports his medical and dental health are well at this time.  Pt has [employer] and able to  access health care.  Pt has established primary care with Dr. Wally David at [facility]. Patient continues to report good physical health, with no changes.    Dimension 3: Emotional/Behavioral Conditions & Complications -   Previous Dimension Ratin  Current Dimension Ratin  PHQ9     PHQ-9 SCORE 2022   PHQ-9 Total Score 23     GAD7     FAISAL-7 SCORE 2021   Total Score 13 (moderate anxiety) -   Total Score 13 21     Mental health diagnosis None   Date of last SIB:  never   Date of  last SI:  never  Date of last HI: never  Behavioral Targets:  N/A  Current MH Assignments:  N/A    Narrative:  At the time of intake, patient did not report past or current mental health diagnoses.  Patient reports he met with a primary doctor two weeks ago and was placed on anxiety medication.  Pt states he started exercising daily and his mood has improved.  Pt met with counselors about his lack of group attendance.  Pt states he is a  and most showing are during the mid day.  Pt asked if he can switch to Evening or Morning groups.  Unfortunately there was not opening at this time.  Pt agreed to start attending two Service Coordination/Day Outpatient groups ( and ).  He also agreed to start AA meeting attendance.        Dimension 4: Treatment Acceptance / Resistance -   Previous Dimension Ratin  Current Dimension Ratin  ELIJAH Diagnosis:  Alcohol Use Disorder   303.90 (F10.20) Severe .  Stage - 1  Commitment to tx process/Stage of change- Action  ELIJAH assignments - Maintain internal motivation for recovery by attending groups and listening to fellow group members' addiction stories     Narrative - Patient seems motivated for treatment.  Pt reports there is no pressure/mandate from court/probation/legal issues and he decided to seek help on his term.  Pt states date of last use was on 1/3/2022 and had been sober for 4 days prior to the time of  "intake.   Pt verbalizes drinking as a problem and his need to quit.  Pt states treatment is providing him with new knowledge and he wants to re-commit and start attending two groups per week.      Dimension 5: Relapse / Continued Problem Potential -   Previous Dimension Ratin  Current Dimension Rating:  3  Relapses this week - None  Urges to use - None  UA results - No results found for this or any previous visit (from the past 168 hour(s)).    Narrative- Last use of alcohol on 1/3/2022.  This was the date of original intake but patient admits he relapsed on that day. Pt reports the longest sobriety in his lifetime as 5-6 months on his own.  He seems to have little insight into his triggers as evidenced by his statement. \"I can't put my fingers around it.\" Patient reports he has quit his part-time  job to avoid being close to alcohol.   Pt states he is remaining sober by staying good contact with his parents and getting good support form his girlfriend.        Dimension 6: Recovery Environment -  Previous Dimension Ratin  Current Dimension Ratin  Family Involvement -   Summarize attendance at family groups and family sessions - N/A  Family supportive of treatment?  Yes    Community support group attendance - No  Recreational activities - exercise (30 min jogging a day)    Narrative -  Patient reports his current incomes comes from employment and investments. 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   Patient states he has college degree in [employer] and has been able to fulfill his occupational obligations.  He is currently living independently with his girlfriend and renting an apartment in [place]. Patient reports he kept his drinking to himself and none of his friends know about the depth of his addiction or the fact that he is getting treatment.  Pt states his parents and girlfriends are supportive of his recovery.   Patent does not report any past or current legal issues.  This " is patient's very first ELIJAH treatment in lifetime.   Pt is not engaged in any community sober support group; however emphasizes staying busy with work, and jogging for at least 30 minutes a day when possible.     Justification for Continued Treatment at this Level of Care:  Recent alcohol use on 1/3/2022    Discharge Planning:  Target Discharge Date/Timeframe:  7/7/2022   Med Mgmt Provider/Appt:  TBD   Ind therapy Provider/Appt:  N/A   Family therapy Provider/Appt:  N/A   Other referrals:  N/A      Has vulnerable adult status change? No    Service Coordination:  TBD    Supervision:  Every Wednesdays     Are Treatment Plan goals/objectives effective? Yes  *If no, list changes to treatment plan:    Are the current goals meeting client's needs? Yes  *If no, list the changes to treatment plan.    Client Input / Response: Patient admits his drinking became problem and expresses his desire to learn recovery skills.  Pt denies any cravings at this time.       *Client agrees with any changes to the treatment plan: N/A  *Client received copy of changes: N/A  *Client is aware of right to access a treatment plan review: Yes

## 2022-02-14 ENCOUNTER — DOCUMENTATION ONLY (OUTPATIENT)
Dept: ADDICTION MEDICINE | Facility: HOSPITAL | Age: 37
End: 2022-02-14
Payer: COMMERCIAL

## 2022-02-15 ENCOUNTER — DOCUMENTATION ONLY (OUTPATIENT)
Dept: ADDICTION MEDICINE | Facility: HOSPITAL | Age: 37
End: 2022-02-15
Payer: COMMERCIAL

## 2022-02-15 NOTE — PROGRESS NOTES
ABSENT NOTE:    Albert Kumar was absent from group 2/14/2022 . This absence was excused.  Patient emailed counselors to remind that he is out of town.  Patient is expected to be in group on  Thursday 2/17/2022.    RONALDO Ravi  2/14/2022 , 6:21 PM

## 2022-02-15 NOTE — PROGRESS NOTES
ABSENT NOTE:    Albert Kumar was absent from group 2/15/2022 . This absence was excused.  Patient is Patient is on personal vacation and in [place].  He is expected to be in group on 2/17/2022.      Jacky Nash, [facility]  2/15/2022 , 2:47 PM

## 2022-02-17 ENCOUNTER — DOCUMENTATION ONLY (OUTPATIENT)
Dept: ADDICTION MEDICINE | Facility: CLINIC | Age: 37
End: 2022-02-17
Payer: COMMERCIAL

## 2022-02-17 NOTE — PROGRESS NOTES
ABSENT NOTE:    Albert Kumar was absent from group 2/17/2022 . This absence was excused. This writer attempted to contact patient via E-mail. Patient emailed to report that his return flight to [place] was canceled on 2/16/2022;therefore had to wait to fly back to [place] today.  Patient is expected to be in group on 2/22/2022.     RONALDO Kemp  2/17/2022 , 4:46 PM

## 2022-02-18 ENCOUNTER — DOCUMENTATION ONLY (OUTPATIENT)
Dept: ADDICTION MEDICINE | Facility: CLINIC | Age: 37
End: 2022-02-18
Payer: COMMERCIAL

## 2022-02-18 NOTE — PROGRESS NOTES
Weekly Progress Note 2/12/2022 to 2/18/2022  Albert Kumar  1985  [id number]      D) Pt attended ZERO groups  this week with 3 excused absences. A) Staff facilitated groups and reviewed tx progress. Assessed for [facility]. R) Unable to assess along six dimensions or for VA due to lack of attendance.   T) Patient was granted excuse for one week, to travel out of state for a vacation, and is expected to return to group on Tuesday, 2/22/2022.

## 2022-02-22 ENCOUNTER — VIRTUAL VISIT (OUTPATIENT)
Dept: ADDICTION MEDICINE | Facility: CLINIC | Age: 37
End: 2022-02-22
Attending: FAMILY MEDICINE
Payer: COMMERCIAL

## 2022-02-22 DIAGNOSIS — F10.20 ALCOHOL USE DISORDER, SEVERE, DEPENDENCE (H): Primary | ICD-10-CM

## 2022-02-22 PROCEDURE — H2035 A/D TX PROGRAM, PER HOUR: HCPCS | Mod: HQ,GT,95

## 2022-02-22 NOTE — GROUP NOTE
Video Visit: Zoom Group      Provider verified identity through the following two step process.  Patient provided:  Patient photo and Patient is known previously to provider    Telemedicine Visit: The patient's condition can be safely assessed and treated via synchronous audio and visual telemedicine encounter.      Reason for Telemedicine Visit: Patient has requested telehealth visit    Originating Site (Patient Location): Patient's home    Distant Site (Provider Location): [facility]: [facility]    Consent:  The patient/guardian has verbally consented to: the potential risks and benefits of telemedicine (video visit) versus in person care; bill my insurance or make self-payment for services provided; and responsibility for payment of non-covered services.     Patient would like the video invitation sent by:  Send to e-mail at: [email or web address]    Mode of Communication:  Video Conference via Medical Zoom    As the provider I attest to compliance with applicable laws and regulations related to telemedicine.Group Therapy Documentation    PATIENT'S NAME: Albert Kumar  MRN:   [id number]  :   1985  ACCT. NUMBER: [id number]  DATE OF SERVICE: 22  START TIME: 12:30 PM  END TIME:  3:30 PM  FACILITATOR(S): Jacky Nash LADC; Nirav Castro LADC  TOPIC: BEH Group Therapy  Number of patients attending the group:  3  Group Length:  3 Hours    Group Therapy Type: Psychoeducation    Summary of Group / Topics Discussed:    Depression: Symptoms and Coping Skills      Group Attendance:  Attended group session    Patient's response to the group topic/interactions:  cooperative with task, discussed personal experience with topic, expressed readiness to alter behaviors, expressed understanding of topic, listened actively and offered helpful suggestions to peers    Patient appeared to be Actively participating, Attentive and Engaged.        Client specific details:  Patient reports  sobriety since 1/3/2022, and rates his physical and emotional health at 8/10, on the scale of 0 to 10. Patient reports his return to [place] this weekend from [place], where he was able to attend 1 AA meeting on Tuesday, 2/15/2022, during his skiing vacation. Patient reports much support from his family, but especially from his girlfriend's mother. He reports concentrating day-to-day to manage stress, focus on the present, and not go ahead of self. Patient reports that he wants to keeping challenging himself to remain sober. Patient states the best advice he would give to anyone struggling with addiction today is that it is okay to forgive oneself, then seek forgiveness from others, and take the right steps, and you will be fine. Patient reports he realizes that recovery does not happen over night. Patient reports good thing that happened to him in the past week was that his return flight from [place] got canceled and he used that opportunity to ski one more day and had lots of fun. He expressed his biggest fear is not to go back to the life of alcoholism because he realizes that many people depend on him to be sober. Patient reports Wednesdays are reserved for himself and he will go to the gym to exercise, or go cross-country skiing, and visit the museum at the [facility].

## 2022-02-24 ENCOUNTER — VIRTUAL VISIT (OUTPATIENT)
Dept: ADDICTION MEDICINE | Facility: CLINIC | Age: 37
End: 2022-02-24
Attending: FAMILY MEDICINE
Payer: COMMERCIAL

## 2022-02-24 DIAGNOSIS — F10.20 ALCOHOL USE DISORDER, SEVERE, DEPENDENCE (H): Primary | ICD-10-CM

## 2022-02-24 PROCEDURE — H2035 A/D TX PROGRAM, PER HOUR: HCPCS | Mod: HQ,GT,95

## 2022-02-24 NOTE — GROUP NOTE
Video Visit: Zoom Group      Provider verified identity through the following two step process.  Patient provided:  Patient photo and Patient is known previously to provider    Telemedicine Visit: The patient's condition can be safely assessed and treated via synchronous audio and visual telemedicine encounter.      Reason for Telemedicine Visit: Patient has requested telehealth visit    Originating Site (Patient Location): Patient's home    Distant Site (Provider Location): [facility]: [facility]    Consent:  The patient/guardian has verbally consented to: the potential risks and benefits of telemedicine (video visit) versus in person care; bill my insurance or make self-payment for services provided; and responsibility for payment of non-covered services.     Patient would like the video invitation sent by:  Send to e-mail at: [email or web address]    Mode of Communication:  Video Conference via Medical Zoom    As the provider I attest to compliance with applicable laws and regulations related to telemedicine.Group Therapy Documentation    PATIENT'S NAME: Albert Kumar  MRN:   [id number]  :   1985  ACCT. NUMBER: [id number]  DATE OF SERVICE: 22  START TIME: 12:30 PM  END TIME:  3:30 PM  FACILITATOR(S): Nirav Castro LADC; Jacky Nash LADC  TOPIC: BEH Group Therapy  Number of patients attending the group:  4  Group Length:  3 Hours    Group Therapy Type: Psychoeducation    Summary of Group / Topics Discussed:    The Depression Cycle      Group Attendance:  Attended group session    Patient's response to the group topic/interactions:  cooperative with task, discussed personal experience with topic, expressed readiness to alter behaviors, expressed understanding of topic, gave appropriate feedback to peers, listened actively and offered helpful suggestions to peers    Patient appeared to be Actively participating, Attentive and Engaged.        Client specific details:   Patient reports regular exercise and good routine as helping him to stay sober. He discussed setting achievable goals and implementing coping skills for his daily routines and rated his emotional and physical health at 9/10. Patient identified his current values as family and friends, himself, physical and mental health, and the work projects he is involved with. He also discussed feelings that are easy to share as positive feelings, like kylee, while it is difficult to discuss negative feelings; when things are going bad. Patient reveals plan for the weekend, including connecting with his girlfriend, exercise, maybe see a movie, and visit sister for her birthday.

## 2022-02-25 ENCOUNTER — DOCUMENTATION ONLY (OUTPATIENT)
Dept: ADDICTION MEDICINE | Facility: HOSPITAL | Age: 37
End: 2022-02-25
Payer: COMMERCIAL

## 2022-02-25 NOTE — PROGRESS NOTES
[facility] Weekly Treatment Plan Review      ATTENDANCE for the following date span:  2022 to 2022    Date 22   Group Therapy NA 3 hours N/A 3.0 hours N/A   Individual Therapy Not buzz Not buzz N/A 1 hour  N/A   Family Therapy Not buzz Not buzz N/A Not buzz N/A   Other (Specify) Not buzz Not buzz N/A Not buzz N/A     Patient attended 2/2 groups during this time period.  Pt is scheduled to attend groups on  and  only.      Weekly Treatment Plan Review     Treatment Plan initiated on: 2022    Dimension1: Acute Intoxication/Withdrawal Potential -   Previous Dimension Ratin  Current Dimension Ratin  Date of Last Use 1/3/2022  Any reports of withdrawal symptoms - No    Narrative: Date of last use of alcohol-1/3/2022 in the evening.  At intake, patient reported after few days of sobriety, he was experiencing jitterly hands, some anxiety, sweat.  However, patient did not report need for medical detox and states he was able to tolerate the symptoms. Since the intake, patient reports he is remaining sober and has not reported any issues with withdrawals.      Dimension 2: Biomedical Conditions & Complications -   Previous Dimension Ratin  Current Dimension Ratin  Medical Concerns:  None   Current Medications & Medication Changes:  Current Outpatient Medications   Medication     buPROPion (WELLBUTRIN) 75 MG tablet     fluticasone (FLONASE) 50 MCG/ACT nasal spray     No current facility-administered medications for this visit.     Medication Prescriber:  Joel Daniel Irwin Wegener MD, [facility]  Taking meds as prescribed? Yes  Medication side effects or concerns: No  Outside medical appointments this week (list provider and reason for visit):  None     Narratives: Pt reports his medical and dental health are well at this time.  Pt has [facility] and able to access health care.  Pt has  established primary care with Dr. Wally David at [facility]. Patient continues to report good physical health, with no changes.    Dimension 3: Emotional/Behavioral Conditions & Complications -   Previous Dimension Ratin  Current Dimension Ratin  PHQ9     PHQ-9 SCORE 2022   PHQ-9 Total Score 23     GAD7     FAISAL-7 SCORE 2021   Total Score 13 (moderate anxiety) -   Total Score 13 21     Mental health diagnosis None   Date of last SIB:  never   Date of  last SI:  never  Date of last HI: never  Behavioral Targets:  N/A  Current MH Assignments:  N/A    Narrative:  At the time of intake, patient did not report past or current mental health diagnoses, however pt reports he is on anti-depression medication prescribed by his primary doctor. Pt states since starting outpatient treatment, he is going to gym more often and his mood has improved.  Patient was out of town last week, visiting his condo in [id number].  Pt was scheduled to come back to group on  but he emailed stating his flight got cancelled.  Pt states he ended up staying 1 extra day and skied and visited an art museum.  Pt also states he went to AA meeting in [place] and he plans to find one in [place].  At this time, patient is scheduled to attend 2 groups per week on  and .      Dimension 4: Treatment Acceptance / Resistance -   Previous Dimension Ratin  Current Dimension Ratin  ELIJAH Diagnosis:  Alcohol Use Disorder   303.90 (F10.20) Severe .  Stage - 1  Commitment to tx process/Stage of change- Action  ELIJAH assignments - Maintain internal motivation for recovery by attending groups and listening to fellow group members' addiction stories     Narrative - Patient seems motivated for treatment.  Pt reports there is no pressure/mandate from court/probation/legal issues and he decided to seek help on his term.  Pt states date of last use was on 1/3/2022 and had been sober for 4  "days prior to the time of intake.   Pt verbalizes drinking as a problem and his need to quit.  Pt states treatment is providing him with new knowledge and he wants to re-commit and start attending two groups per week.      Dimension 5: Relapse / Continued Problem Potential -   Previous Dimension Ratin  Current Dimension Rating:  3  Relapses this week - None  Urges to use - None  UA results - No results found for this or any previous visit (from the past 168 hour(s)).    Narrative- Last use of alcohol on 1/3/2022.  This was the date of original intake but patient admits he relapsed on that day. Pt reports the longest sobriety in his lifetime as 5-6 months on his own.  He seems to have little insight into his triggers as evidenced by his statement. \"I can't put my fingers around it.\" Patient reports he has quit his part-time  job to avoid being close to alcohol.   Pt states he is remaining sober by staying good contact with his parents and getting good support form his girlfriend.        Dimension 6: Recovery Environment -  Previous Dimension Ratin  Current Dimension Ratin  Family Involvement -   Summarize attendance at family groups and family sessions - N/A  Family supportive of treatment?  Yes    Community support group attendance - No  Recreational activities - exercise (30 min jogging a day)    Narrative -  Patient reports his current incomes comes from employment and investments. 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   Patient states he has college degree in [employer] communication and has been able to fulfill his occupational obligations.  He is currently living independently with his girlfriend and renting an apartment in [place]. Patient reports he kept his drinking to himself and none of his friends know about the depth of his addiction or the fact that he is getting treatment.  Pt states his parents and girlfriends are supportive of his recovery.   Patent does not report any past or " current legal issues.  This is patient's very first ELIJAH treatment in lifetime.   Pt is not engaged in any community sober support group; however emphasizes staying busy with work, and jogging for at least 30 minutes a day when possible.     Justification for Continued Treatment at this Level of Care:  Recent alcohol use on 1/3/2022    Discharge Planning:  Target Discharge Date/Timeframe:  7/7/2022   Med Mgmt Provider/Appt:  MARYCRUZ   Ind therapy Provider/Appt:  N/A   Family therapy Provider/Appt:  N/A   Other referrals:  N/A      Has vulnerable adult status change? No    Service Coordination:  TBD    Supervision:  Every Wednesdays     Are Treatment Plan goals/objectives effective? Yes  *If no, list changes to treatment plan:    Are the current goals meeting client's needs? Yes  *If no, list the changes to treatment plan.    Client Input / Response: Patient admits his drinking became problem and expresses his desire to learn recovery skills.  Pt denies any cravings at this time.       *Client agrees with any changes to the treatment plan: N/A  *Client received copy of changes: N/A  *Client is aware of right to access a treatment plan review: Yes

## 2022-03-01 ENCOUNTER — VIRTUAL VISIT (OUTPATIENT)
Dept: ADDICTION MEDICINE | Facility: CLINIC | Age: 37
End: 2022-03-01
Attending: FAMILY MEDICINE
Payer: COMMERCIAL

## 2022-03-01 DIAGNOSIS — F10.20 ALCOHOL USE DISORDER, SEVERE, DEPENDENCE (H): Primary | ICD-10-CM

## 2022-03-01 PROCEDURE — H2035 A/D TX PROGRAM, PER HOUR: HCPCS | Mod: HQ,GT,95

## 2022-03-01 NOTE — GROUP NOTE
Group Therapy Documentation    PATIENT'S NAME: Albert Kumar  MRN:   [id number]  :   1985  ACCT. NUMBER: [id number]  DATE OF SERVICE: 3/01/22  START TIME: 12:30 PM  END TIME:  3:30 PM  FACILITATOR(S): Jacky Nash LADC; Nirav Castro LADC  TOPIC: BEH Group Therapy  Number of patients attending the group: 4  Group Length:  3 hours    Group Therapy Type: Addiction and Psychoeducation    Summary of Group / Topics Discussed:    Identify Triggers and Creating Plans to [place] with Them     Video Visit:      Provider verified identity through the following two step process.  Patient provided:  Patient is known previously to provider    Telemedicine Visit: The patient's condition can be safely assessed and treated via synchronous audio and visual telemedicine encounter.      Reason for Telemedicine Visit: Patient has requested telehealth visit    Originating Site (Patient Location): Patient's home    Distant Site (Provider Location): Provider Remote Setting- Home Office    Consent:  The patient/guardian has verbally consented to: the potential risks and benefits of telemedicine (video visit) versus in person care; bill my insurance or make self-payment for services provided; and responsibility for payment of non-covered services.     Patient would like the video invitation sent by:  Send to e-mail at: [email or web address]    Mode of Communication:  Video Conference via Medical Zoom    As the provider I attest to compliance with applicable laws and regulations related to telemedicine.  Group Attendance:  Attended group session    Patient's response to the group topic/interactions:  cooperative with task, discussed personal experience with topic, expressed readiness to alter behaviors, expressed understanding of topic, gave appropriate feedback to peers, listened actively and offered helpful suggestions to peers    Patient appeared to be Actively participating, Attentive and Engaged.        Client  specific details:  Patient states Sunday was his sister's birthday and he went to [facility] with her on Saturday.  Pt states he is sober since 1/7/22 and not expderiencing any cravings at this time.  Pt states exercising daily has been helpful in maintaining sobriety.

## 2022-03-03 ENCOUNTER — VIRTUAL VISIT (OUTPATIENT)
Dept: ADDICTION MEDICINE | Facility: CLINIC | Age: 37
End: 2022-03-03
Attending: FAMILY MEDICINE
Payer: COMMERCIAL

## 2022-03-03 DIAGNOSIS — F10.20 ALCOHOL USE DISORDER, SEVERE, DEPENDENCE (H): Primary | ICD-10-CM

## 2022-03-03 PROCEDURE — H2035 A/D TX PROGRAM, PER HOUR: HCPCS | Mod: HQ,GT,95

## 2022-03-03 NOTE — GROUP NOTE
Video Visit: Zoom Group      Provider verified identity through the following two step process.  Patient provided:  Patient photo and Patient is known previously to provider    Telemedicine Visit: The patient's condition can be safely assessed and treated via synchronous audio and visual telemedicine encounter.      Reason for Telemedicine Visit: Patient has requested telehealth visit    Originating Site (Patient Location): Patient's home    Distant Site (Provider Location): [facility]: [facility]    Consent:  The patient/guardian has verbally consented to: the potential risks and benefits of telemedicine (video visit) versus in person care; bill my insurance or make self-payment for services provided; and responsibility for payment of non-covered services.     Patient would like the video invitation sent by:  Send to e-mail at: [email or web address]    Mode of Communication:  Video Conference via Medical Zoom    As the provider I attest to compliance with applicable laws and regulations related to telemedicine.Group Therapy Documentation    PATIENT'S NAME: Albert Kumar  MRN:   [id number]  :   1985  ACCT. NUMBER: [id number]  DATE OF SERVICE: 3/03/22  START TIME: 12:30 PM  END TIME:  3:30 PM  FACILITATOR(S): Nirav Castro LADC; Jacky Nash LADC  TOPIC: BEH Group Therapy  Number of patients attending the group:  3  Group Length:  3 Hours    Group Therapy Type: Psychoeducation    Summary of Group / Topics Discussed:    Understanding Self: Who Am I Really      Group Attendance:  Attended group session    Patient's response to the group topic/interactions:  cooperative with task, did not discuss personal experience, discussed personal experience with topic, expressed readiness to alter behaviors, expressed understanding of topic, gave appropriate feedback to peers, listened actively and offered helpful suggestions to peers    Patient appeared to be Actively participating,  Attentive and Engaged.        Client specific details:  Patient reports he is maintaining his sobriety by setting smaller goals everyday and staying busy with work. Patient also reports physical exercise. He reports sobriety since 1/3/2022. Patient rated his physical and emotional health at 8/10 today; says he feels very good. Patient states his parents may travel over the weekend and he is likely to dog sit for them while they are gone. Patient identifies himself as a traveler, interested in different cultures, and shared that he has three different nationalities; [place], American, an Turk[place]. Patient reports he appreciates his life experiences gained trough traveling to different places, and hopes that his life style is in line with his personal values. Patient reports he tries to find pleasure in the small things he does everyday. Patient reveals that he enjoys playing gulf and would like to travel to more places to play. Patient has been in this ELIJAH treatment since 1/7/2022, and appears to be doing well.

## 2022-03-04 ENCOUNTER — DOCUMENTATION ONLY (OUTPATIENT)
Dept: ADDICTION MEDICINE | Facility: CLINIC | Age: 37
End: 2022-03-04
Payer: COMMERCIAL

## 2022-03-04 NOTE — PROGRESS NOTES
[facility] Weekly Treatment Plan Review      ATTENDANCE for the following date span:  2022 to 3/4/2022    Date Monday 2/28/22 Tuesday 03/1/22 Wednesday 03/2/22 Thursday 03/3/22 Friday 2022   Group Therapy NA 3 hours N/A 3.0 hours N/A   Individual Therapy Not buzz Not buzz N/A 1 hour  N/A   Family Therapy Not buzz Not buzz N/A Not buzz N/A   Other (Specify) Not buzz Not buzz N/A Not buzz N/A     Patient attended 2/2 groups during this time period.  Pt is scheduled to attend groups on  and  only.      Weekly Treatment Plan Review     Treatment Plan initiated on: 2022    Dimension1: Acute Intoxication/Withdrawal Potential -   Previous Dimension Ratin  Current Dimension Ratin  Date of Last Use 1/3/2022  Any reports of withdrawal symptoms - No    Narrative: Date of last use of alcohol-1/3/2022 in the evening.  At intake, patient reported after few days of sobriety, he was experiencing jitterly hands, some anxiety, sweat.  However, patient did not report need for medical detox and states he was able to tolerate the symptoms. Since the intake, patient reports he is remaining sober and has not reported any issues with withdrawals.      Dimension 2: Biomedical Conditions & Complications -   Previous Dimension Ratin  Current Dimension Ratin  Medical Concerns:  None   Current Medications & Medication Changes:  Current Outpatient Medications   Medication     buPROPion (WELLBUTRIN) 75 MG tablet     fluticasone (FLONASE) 50 MCG/ACT nasal spray     No current facility-administered medications for this visit.     Medication Prescriber:  Joel Daniel Irwin Wegener MD, [facility]  Taking meds as prescribed? Yes  Medication side effects or concerns: No  Outside medical appointments this week (list provider and reason for visit):  None     Narratives: Pt reports his medical and dental health are well at this time.  Pt has [facility] and able to access health care.  Pt has established  primary care with Dr. Wally David at [facility]. Patient continues to report good physical health;  involved with regular physical exercises, and eating healthy.    Dimension 3: Emotional/Behavioral Conditions & Complications -   Previous Dimension Ratin  Current Dimension Ratin  PHQ9     PHQ-9 SCORE 2022   PHQ-9 Total Score 23     GAD7     FAISAL-7 SCORE 2021   Total Score 13 (moderate anxiety) -   Total Score 13 21     Mental health diagnosis None   Date of last SIB:  never   Date of  last SI:  never  Date of last HI: never  Behavioral Targets:  N/A  Current MH Assignments:  N/A    Narrative:  At the time of intake, patient did not report past or current mental health diagnoses, however pt reports he is on anti-depression medication prescribed by his primary doctor. Pt states since starting outpatient treatment, he is going to gym more often and his mood has improved. At this time, patient is scheduled to attend 2 groups per week on  and . Patient did not report any mental health concerns this week.    Dimension 4: Treatment Acceptance / Resistance -   Previous Dimension Ratin  Current Dimension Ratin  ELIJAH Diagnosis:  Alcohol Use Disorder   303.90 (F10.20) Severe .  Stage - 1  Commitment to tx process/Stage of change- Action  ELIJAH assignments - Maintain internal motivation for recovery by attending groups and listening to fellow group members' addiction stories     Narrative - Patient seems motivated for treatment.  Pt reports there is no pressure/mandate from court/probation/legal issues and he decided to seek help on his term.  Pt states date of last use was on 1/3/2022 and had been sober for 4 days prior to the time of intake. Pt verbalizes drinking as a problem and his need to quit.  Pt emphasized benefits of treatment and states treatment is providing him with new knowledge and insight about life. Patient has completed 35 hours of  "treatment at this time, and is scheduled to attend two group sessions per week.      Dimension 5: Relapse / Continued Problem Potential -   Previous Dimension Ratin  Current Dimension Rating:  3  Relapses this week - None  Urges to use - None  UA results - No results found for this or any previous visit (from the past 168 hour(s)).    Narrative- Last use of alcohol on 1/3/2022.  This was the date of original intake but patient admits he relapsed on that day. Pt reports the longest sobriety in his lifetime as 5-6 months on his own.  He appeared to have had  little insight into his triggers at the time of intake, as evidenced by his statement. \"I can't put my fingers around it.\"  However, patient is beginning to express increased insight, as evidenced by his verbalizations of triggers and coping skills. Pt states he is remaining sober by staying in contact with his parents and getting good support form his girlfriend.        Dimension 6: Recovery Environment -  Previous Dimension Ratin  Current Dimension Ratin  Family Involvement -   Summarize attendance at family groups and family sessions - N/A  Family supportive of treatment?  Yes    Community support group attendance - No  Recreational activities - exercise (30 min jogging a day), busy at work    Narrative -  Patient reports his current incomes comes from employment and investments. 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   Patient states he has college degree in mass communication and has been able to fulfill his occupational obligations.  He is currently living independently with his girlfriend and renting an apartment in [place]. Patient reports he kept his drinking to himself and none of his friends knew about the depth of his addiction or the fact that he was getting treatment.  Pt states his parents and girlfriends are supportive of his recovery.   Patent does not report any past or current legal issues. This is patient's very first ELIJAH " treatment in lifetime. Pt is not engaged in any community sober support group; however emphasizes staying busy with work, and jogging for at least 30 minutes a day when possible. Patient did not report any significant changes in this area this week.    Justification for Continued Treatment at this Level of Care:  Recent alcohol use on 1/3/2022    Discharge Planning:  Target Discharge Date/Timeframe:  7/7/2022   Med Mgmt Provider/Appt:  MARYCRUZ   Ind therapy Provider/Appt:  N/A   Family therapy Provider/Appt:  N/A   Other referrals:  N/A      Has vulnerable adult status change? No    Service Coordination:  TBD    Supervision:  Every Wednesdays     Are Treatment Plan goals/objectives effective? Yes  *If no, list changes to treatment plan:    Are the current goals meeting client's needs? Yes  *If no, list the changes to treatment plan.    Client Input / Response: Patient admits his drinking became problem and expresses his desire to learn recovery skills.  Pt denies any cravings at this time.       *Client agrees with any changes to the treatment plan: N/A  *Client received copy of changes: N/A  *Client is aware of right to access a treatment plan review: Yes

## 2022-03-08 ENCOUNTER — VIRTUAL VISIT (OUTPATIENT)
Dept: ADDICTION MEDICINE | Facility: CLINIC | Age: 37
End: 2022-03-08
Attending: FAMILY MEDICINE
Payer: COMMERCIAL

## 2022-03-08 DIAGNOSIS — F10.20 ALCOHOL USE DISORDER, SEVERE, DEPENDENCE (H): Primary | ICD-10-CM

## 2022-03-08 PROCEDURE — H2035 A/D TX PROGRAM, PER HOUR: HCPCS | Mod: HQ,GT,95

## 2022-03-08 NOTE — GROUP NOTE
Video Visit: Zoom Group      Provider verified identity through the following two step process.  Patient provided:  Patient photo and Patient is known previously to provider    Telemedicine Visit: The patient's condition can be safely assessed and treated via synchronous audio and visual telemedicine encounter.      Reason for Telemedicine Visit: Patient has requested telehealth visit    Originating Site (Patient Location): Patient's home    Distant Site (Provider Location): [facility]: [facility]    Consent:  The patient/guardian has verbally consented to: the potential risks and benefits of telemedicine (video visit) versus in person care; bill my insurance or make self-payment for services provided; and responsibility for payment of non-covered services.     Patient would like the video invitation sent by:  Send to e-mail at: [email or web address]    Mode of Communication:  Video Conference via Medical Zoom    As the provider I attest to compliance with applicable laws and regulations related to telemedicine.Group Therapy Documentation    PATIENT'S NAME: Albert Kumar  MRN:   [id number]  :   1985  ACCT. NUMBER: [id number]  DATE OF SERVICE: 3/08/22  START TIME: 12:30 PM  END TIME:  3:30 PM  FACILITATOR(S): Nirav Castro LADC; Jacky Nash LADC  TOPIC: BEH Group Therapy  Number of patients attending the group:  4  Group Length:  3 Hours    Group Therapy Type: Psychoeducation    Summary of Group / Topics Discussed:    Defense Mechanisms      Group Attendance:  Attended group session    Patient's response to the group topic/interactions:  cooperative with task, discussed personal experience with topic, expressed readiness to alter behaviors, expressed understanding of topic, gave appropriate feedback to peers, listened actively and offered helpful suggestions to peers    Patient appeared to be Actively participating, Attentive and Engaged.        Client specific details:   "Patient reports exercise, group, family and friends as what has helped him to stay free from alcohol, and maintained his sobriety date as 1/7/2022. Patient rates his emotional and physical health at 9/10; says he is having great experiences at work. He reports that his parents went out of town over the weekend and he had to watch out for their dogs, which was fun for him. Patient reports being stress-free at this time because when he has a problem, he breaks it into smaller, more manageable pieces, which makes it easier to solve. Patient reports he knows that current activities are working well for his recovery because he enjoys doing the things he does. Patient reports, \"acceptance helps my sobriety because you let go of things you can't control and be honest of your values.\" Patient reports his support comes from group, his girlfriend and some friends..      "

## 2022-03-09 DIAGNOSIS — F19.20 CHEMICAL DEPENDENCY (H): Primary | ICD-10-CM

## 2022-03-10 ENCOUNTER — VIRTUAL VISIT (OUTPATIENT)
Dept: ADDICTION MEDICINE | Facility: CLINIC | Age: 37
End: 2022-03-10
Attending: FAMILY MEDICINE
Payer: COMMERCIAL

## 2022-03-10 DIAGNOSIS — F10.20 ALCOHOL USE DISORDER, SEVERE, DEPENDENCE (H): Primary | ICD-10-CM

## 2022-03-10 PROCEDURE — H2035 A/D TX PROGRAM, PER HOUR: HCPCS | Mod: HQ,GT,95

## 2022-03-10 NOTE — GROUP NOTE
Group Therapy Documentation    PATIENT'S NAME: Albert Kumar  MRN:   [id number]  :   1985  ACCT. NUMBER: [id number]  DATE OF SERVICE: 3/10/22  START TIME: 12:30 PM  END TIME:  3:30 PM  FACILITATOR(S): Jacky Nash LADC; Nirav Castro LADC  TOPIC: BEH Group Therapy  Number of patients attending the group: 5  Group Length:  3 hours    Group Therapy Type: Addiction and Psychoeducation    Summary of Group / Topics Discussed:    Relapse Prevention    Video Visit:      Provider verified identity through the following two step process.  Patient provided:  Patient is known previously to provider    Telemedicine Visit: The patient's condition can be safely assessed and treated via synchronous audio and visual telemedicine encounter.      Reason for Telemedicine Visit: Patient has requested telehealth visit    Originating Site (Patient Location): Patient's home    Distant Site (Provider Location): Provider Remote Setting- Home Office    Consent:  The patient/guardian has verbally consented to: the potential risks and benefits of telemedicine (video visit) versus in person care; bill my insurance or make self-payment for services provided; and responsibility for payment of non-covered services.     Patient would like the video invitation sent by:  Send to e-mail at: [email or web address]    Mode of Communication:  Video Conference via Medical Zoom    As the provider I attest to compliance with applicable laws and regulations related to telemedicine.  Group Attendance:  Attended group session from 1:30-3:30 PM for 2 hours.  Pt informed counselors in advance that he would be late for group due to his work.     Patient's response to the group topic/interactions:  cooperative with task, discussed personal experience with topic, expressed readiness to alter behaviors, expressed understanding of topic, gave appropriate feedback to peers, listened actively and offered helpful suggestions to  "peers    Patient appeared to be Actively participating, Attentive, Engaged and Inattentive.        Client specific details:  Patient was highly engaged in today's session and shared his stories on how alcohol impacted his life.  Pt states he is remaining sober and the last date of use has not changed (1/7/2022).  Pt states he attending group has been helpful staying sober.  Pt shared how he used to make bad decisions under the influence.  Pt states once when he was younger, he was involved in a \"case race\" where group of friends tested who can drink beer faster while driving.  Pt states two of the friends were dead now due to suicide and over dose. Pt states he is grateful for being alive and the chance to live.        "

## 2022-03-11 ENCOUNTER — DOCUMENTATION ONLY (OUTPATIENT)
Dept: ADDICTION MEDICINE | Facility: HOSPITAL | Age: 37
End: 2022-03-11
Payer: COMMERCIAL

## 2022-03-11 NOTE — PROGRESS NOTES
[facility] Weekly Treatment Plan Review      ATTENDANCE for the following date span:  3/5/2022 to 3/11/2022    Date Monday 3/7/22 Tuesday 03/8/22 Wednesday 03/9/22 Thursday 03/10/22 Friday 0311/2022   Group Therapy NA 3 hours N/A 2.0 hours N/A   Individual Therapy Not bzuz Not buzz N/A 1 hour  N/A   Family Therapy Not buzz Not buzz N/A Not buzz N/A   Other (Specify) Not buzz Not buzz N/A Not buzz N/A     Patient attended 2/2 groups during this time period.  Pt is scheduled to attend groups on  and  only.      Weekly Treatment Plan Review     Treatment Plan initiated on: 2022    Dimension1: Acute Intoxication/Withdrawal Potential -   Previous Dimension Ratin  Current Dimension Ratin  Date of Last Use 1/3/2022  Any reports of withdrawal symptoms - No    Narrative: Date of last use of alcohol-1/3/2022 in the evening.  At intake, patient reported after few days of sobriety, he was experiencing jitterly hands, some anxiety, sweat.  However, patient did not report need for medical detox and states he was able to tolerate the symptoms. Since the intake, patient reports he is remaining sober and has not reported any issues with withdrawals.      Dimension 2: Biomedical Conditions & Complications -   Previous Dimension Ratin  Current Dimension Ratin  Medical Concerns:  None   Current Medications & Medication Changes:  Current Outpatient Medications   Medication     buPROPion (WELLBUTRIN) 75 MG tablet     Digital Therapy (RESET FOR IOS OR ANDROID JACQUELIN) MISC     fluticasone (FLONASE) 50 MCG/ACT nasal spray     No current facility-administered medications for this visit.     Medication Prescriber:  Joel Daniel Irwin Wegener MD, [facility]  Taking meds as prescribed? Yes  Medication side effects or concerns: No  Outside medical appointments this week (list provider and reason for visit):  None     Narratives: Pt reports his medical and dental health are well at this time.  Pt has  [facility] and able to access health care.  Pt has established primary care with Dr. Wally David at [facility]. Patient continues to report good physical health;  involved with regular physical exercises, and eating healthy. Pt states he started jogging since starting outpatient treatment.  Pt states his goa is to run 25 miles a week.      Dimension 3: Emotional/Behavioral Conditions & Complications -   Previous Dimension Ratin  Current Dimension Ratin  PHQ9     PHQ-9 SCORE 2022   PHQ-9 Total Score 23     GAD7     FAISAL-7 SCORE 2021   Total Score 13 (moderate anxiety) -   Total Score 13 21     Mental health diagnosis None   Date of last SIB:  never   Date of  last SI:  never  Date of last HI: never  Behavioral Targets:  N/A  Current MH Assignments:  N/A    Narrative:  At the time of intake, patient did not report past or current mental health diagnoses, however pt reports he is on anti-depression medication prescribed by his primary doctor. Pt states since starting outpatient treatment, he is going to gym more often and his mood has improved. At this time, patient is scheduled to attend 2 groups per week on  and . Patient did not report any mental health concerns this week. Pt states attending groups have been helpful with staying sober.  Pt shared more about his addiction and how he used to make risky decisions/behaviors under the influence.      Dimension 4: Treatment Acceptance / Resistance -   Previous Dimension Ratin  Current Dimension Ratin  ELIJAH Diagnosis:  Alcohol Use Disorder   303.90 (F10.20) Severe .  Stage - 1  Commitment to tx process/Stage of change- Action  ELIJAH assignments - Maintain internal motivation for recovery by attending groups and listening to fellow group members' addiction stories     Narrative - Patient seems motivated for treatment.  Pt reports there is no pressure/mandate from court/probation/legal issues and he  "decided to seek help on his term.  Pt states date of last use was on 1/3/2022 and had been sober for 4 days prior to the time of intake. Pt verbalizes drinking as a problem and his need to quit.  Pt emphasized benefits of treatment and states treatment is providing him with new knowledge and insight about life. Patient has completed 40 hours of treatment at this time, and is scheduled to attend two group sessions per week.      Dimension 5: Relapse / Continued Problem Potential -   Previous Dimension Ratin  Current Dimension Rating:  3  Relapses this week - None  Urges to use - None  UA results - No results found for this or any previous visit (from the past 168 hour(s)).    Narrative- Last use of alcohol on 1/3/2022.  This was the date of original intake but patient admits he relapsed on that day. Pt reports the longest sobriety in his lifetime as 5-6 months on his own.  He appeared to have had  little insight into his triggers at the time of intake, as evidenced by his statement. \"I can't put my fingers around it.\"  However, patient is beginning to express increased insight, as evidenced by his verbalizations of triggers and coping skills. Pt states he is remaining sober by staying in contact with his parents and getting good support form his girlfriend.        Dimension 6: Recovery Environment -  Previous Dimension Ratin  Current Dimension Ratin  Family Involvement -   Summarize attendance at family groups and family sessions - N/A  Family supportive of treatment?  Yes    Community support group attendance - No  Recreational activities - exercise (30 min jogging a day), busy at work    Narrative -  Patient reports his current incomes comes from employment and investments. 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   Patient states he has college degree in [employer] communication and has been able to fulfill his occupational obligations.  He is currently living independently with his girlfriend and renting an " apartment in [place]. Patient reports he kept his drinking to himself and none of his friends knew about the depth of his addiction or the fact that he was getting treatment.  Pt states his parents and girlfriends are supportive of his recovery.   Patent does not report any past or current legal issues. This is patient's very first ELIJAH treatment in lifetime. Pt is not engaged in any community sober support group; however emphasizes staying busy with work, and jogging for at least 30 minutes a day when possible. Patient did not report any significant changes in this area this week.    Justification for Continued Treatment at this Level of Care:  Recent alcohol use on 1/3/2022    Discharge Planning:  Target Discharge Date/Timeframe:  7/7/2022   Med Mgmt Provider/Appt:  MARYCRUZ   Ind therapy Provider/Appt:  N/A   Family therapy Provider/Appt:  N/A   Other referrals:  N/A      Has vulnerable adult status change? No    Service Coordination:  TBD    Supervision:  Every Wednesdays     Are Treatment Plan goals/objectives effective? Yes  *If no, list changes to treatment plan:    Are the current goals meeting client's needs? Yes  *If no, list the changes to treatment plan.    Client Input / Response: Patient admits his drinking became problem and expresses his desire to learn recovery skills.  Pt denies any cravings at this time.       *Client agrees with any changes to the treatment plan: N/A  *Client received copy of changes: N/A  *Client is aware of right to access a treatment plan review: Yes

## 2022-03-15 ENCOUNTER — VIRTUAL VISIT (OUTPATIENT)
Dept: ADDICTION MEDICINE | Facility: CLINIC | Age: 37
End: 2022-03-15
Attending: FAMILY MEDICINE
Payer: COMMERCIAL

## 2022-03-15 DIAGNOSIS — F10.20 ALCOHOL USE DISORDER, SEVERE, DEPENDENCE (H): Primary | ICD-10-CM

## 2022-03-15 PROCEDURE — H2035 A/D TX PROGRAM, PER HOUR: HCPCS | Mod: HQ,GT,95

## 2022-03-15 NOTE — GROUP NOTE
Group Therapy Documentation    PATIENT'S NAME: Albert Kumar  MRN:   [id number]  :   1985  ACCT. NUMBER: [id number]  DATE OF SERVICE: 3/15/22  START TIME: 12:30 PM  END TIME:  3:30 PM  FACILITATOR(S): Jacky Nash LADC; Nirav Castro LADC  TOPIC: BEH Group Therapy  Number of patients attending the group:  4  Group Length:  3 hours    Group Therapy Type: Addiction and Psychoeducation    Summary of Group / Topics Discussed:    Sober Activities    Video Visit:      Provider verified identity through the following two step process.  Patient provided:  Patient is known previously to provider    Telemedicine Visit: The patient's condition can be safely assessed and treated via synchronous audio and visual telemedicine encounter.      Reason for Telemedicine Visit: Patient has requested telehealth visit    Originating Site (Patient Location): Patient's home    Distant Site (Provider Location): Provider Remote Setting- Home Office    Consent:  The patient/guardian has verbally consented to: the potential risks and benefits of telemedicine (video visit) versus in person care; bill my insurance or make self-payment for services provided; and responsibility for payment of non-covered services.     Patient would like the video invitation sent by:  Send to e-mail at: [email or web address]    Mode of Communication:  Video Conference via Medical Zoom    As the provider I attest to compliance with applicable laws and regulations related to telemedicine.  Group Attendance:  Attended group session    Patient's response to the group topic/interactions:  cooperative with task, discussed personal experience with topic, expressed readiness to alter behaviors, expressed understanding of topic, gave appropriate feedback to peers, listened actively and offered helpful suggestions to peers    Patient appeared to be Actively participating, Attentive, Engaged and Inattentive.        Client specific details: Patient  identified today's mood/emotion as 9 out of 10 and feeling good.  Pt identified one of his life goals has been to learn foreign languages.  Pt states he already has an adams and wants to learn Armenian.  For today's goal, patient identified to run his elliptical machine.  Pt appreciated group for support.

## 2022-03-17 ENCOUNTER — DOCUMENTATION ONLY (OUTPATIENT)
Dept: ADDICTION MEDICINE | Facility: HOSPITAL | Age: 37
End: 2022-03-17
Payer: COMMERCIAL

## 2022-03-17 NOTE — PROGRESS NOTES
ABSENT NOTE:    Albert Kumar was absent from group 3/17/2022 . This absence was excused. He emailed before the group start time stating he was busy with his work today. Patient is expected to be in group on 3/21/22.     RONALDO Ravi  3/17/2022 , 3:52 PM

## 2022-03-18 ENCOUNTER — DOCUMENTATION ONLY (OUTPATIENT)
Dept: ADDICTION MEDICINE | Facility: CLINIC | Age: 37
End: 2022-03-18
Payer: COMMERCIAL

## 2022-03-18 NOTE — PROGRESS NOTES
[facility] Weekly Treatment Plan Review      ATTENDANCE for the following date span:  3/12/2022 to 3/18/2022    Date Monday 3/14/22 Tuesday 03/15/22 Wednesday 03/16/22 Thursday 03/17/22 Friday 0318/2022   Group Therapy NA 3.0 hours N/A 0.0 hours N/A   Individual Therapy Not buzz Not buzz N/A Not buzz  N/A   Family Therapy Not buzz Not buzz N/A Not buzz N/A   Other (Specify) Not buzz Not buzz N/A Not buzz N/A     Patient attended 1/2 groups during this time period.  Pt is scheduled to attend groups on  and  only. Patient was absent on 3/17/2022     Weekly Treatment Plan Review     Treatment Plan initiated on: 2022    Dimension1: Acute Intoxication/Withdrawal Potential -   Previous Dimension Ratin  Current Dimension Ratin  Date of Last Use 1/3/2022  Any reports of withdrawal symptoms - No    Narrative: Date of last use of alcohol-1/3/2022 in the evening.  At intake, patient reported after few days of sobriety, he was experiencing jitterly hands, some anxiety, sweat. However, patient did not report need for medical detox and states he was able to tolerate the symptoms. Since the intake, patient reports he is remaining sober and has not reported any issues with withdrawals.      Dimension 2: Biomedical Conditions & Complications -   Previous Dimension Ratin  Current Dimension Ratin  Medical Concerns:  None   Current Medications & Medication Changes:  Current Outpatient Medications   Medication     buPROPion (WELLBUTRIN) 75 MG tablet     Digital Therapy (RESET FOR IOS OR ANDROID JACQUELIN) MISC     fluticasone (FLONASE) 50 MCG/ACT nasal spray     No current facility-administered medications for this visit.     Medication Prescriber:  Joel Daniel Irwin Wegener MD, [facility]  Taking meds as prescribed? Yes  Medication side effects or concerns: No  Outside medical appointments this week (list provider and reason for visit):  None     Narratives: Pt reports his medical and dental  health are well at this time.  Pt has [facility] and able to access health care.  Pt has established primary care with Dr. Wally David at [facility]. Patient continues to report good physical health;  involved with regular physical exercises, and eating healthy. Pt states he started jogging since starting outpatient treatment.  Pt states his goal is to run 25 miles a week. Patient did not report any changes in this area this week.      Dimension 3: Emotional/Behavioral Conditions & Complications -   Previous Dimension Ratin  Current Dimension Ratin  PHQ9     PHQ-9 SCORE 2022   PHQ-9 Total Score 23     GAD7     FAISAL-7 SCORE 2021   Total Score 13 (moderate anxiety) -   Total Score 13 21     Mental health diagnosis None   Date of last SIB:  never   Date of  last SI:  never  Date of last HI: never  Behavioral Targets:  N/A  Current MH Assignments:  N/A    Narrative:  At the time of intake, patient did not report past or current mental health diagnoses, however pt reports he is on anti-depression medication prescribed by his primary doctor. Pt states since starting outpatient treatment, he is going to gym more often and his mood has improved. At this time, patient is scheduled to attend 2 groups per week on  and . Patient did not report any mental health concerns this week. Pt states attending groups has been helpful with staying sober.  Pt shared more about his addiction and how he used to make risky decisions/behaviors, such as driving under the influence. Patient rates his emotional health at 9/10 this week, says he is appreciative of the contributions of group members and it's helpful.    Dimension 4: Treatment Acceptance / Resistance -   Previous Dimension Ratin  Current Dimension Ratin  ELIJAH Diagnosis:  Alcohol Use Disorder   303.90 (F10.20) Severe .  Stage - 1  Commitment to tx process/Stage of change- Action  ELIJAH assignments - Maintain  "internal motivation for recovery by attending groups and listening to fellow group members' addiction stories     Narrative - Patient seems motivated for treatment.  Pt reports there is no pressure/mandate from court/probation/legal issues and he decided to seek help on his term.  Pt states date of last use was on 1/3/2022 and had been sober for 4 days prior to the time of intake. Pt verbalizes drinking as a problem and his need to quit.  Pt emphasized benefits of treatment and states treatment is providing him with new knowledge and insight about life. Patient has completed 43 hours of treatment at this time. This week, patient participated in the discussion on managing external triggers and states understanding his triggers is helpful to prevent future relapse.    Dimension 5: Relapse / Continued Problem Potential -   Previous Dimension Ratin  Current Dimension Rating:  3  Relapses this week - None  Urges to use - None  UA results - No results found for this or any previous visit (from the past 168 hour(s)).    Narrative- Last use of alcohol on 1/3/2022.  This was the date of original intake but patient admits he relapsed on that day. Pt reports the longest sobriety in his lifetime as 5-6 months on his own.  He appeared to have had  little insight into his triggers at the time of intake, as evidenced by his statement. \"I can't put my fingers around it.\"  However, patient is beginning to express increased insight, as evidenced by his verbalizations of triggers and coping skills. Pt states he is remaining sober by staying in contact with his parents and getting good support form his girlfriend. He also reports work as one of the ways to help him remain focus and not thing about drinking.       Dimension 6: Recovery Environment -  Previous Dimension Ratin  Current Dimension Ratin  Family Involvement -   Summarize attendance at family groups and family sessions - N/A  Family supportive of treatment?  " Yes    Community support group attendance - No  Recreational activities - exercise (30 min jogging a day), busy at work    Narrative -  Patient reports his current incomes comes from employment and investments. 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   Patient states he has college degree in [employer] communication and has been able to fulfill his occupational obligations.  He is currently living independently with his girlfriend and renting an apartment in [place]. Patient reports he kept his drinking to himself and none of his friends knew about the depth of his addiction or the fact that he was getting treatment.  Pt states his parents and girlfriends are supportive of his recovery.   Patent does not report any past or current legal issues. This is patient's very first ELIJAH treatment in lifetime. Pt is not engaged in any community sober support group; however emphasizes staying busy with work, and reports a goal to jog for at least 25 minutes everyday. Patient did not report any other changes in this area this week.    Justification for Continued Treatment at this Level of Care:  Recent alcohol use on 1/3/2022    Discharge Planning:  Target Discharge Date/Timeframe:  7/7/2022   Med Mgmt Provider/Appt:  MARYCRUZ   Ind therapy Provider/Appt:  N/A   Family therapy Provider/Appt:  N/A   Other referrals:  N/A      Has vulnerable adult status change? No    Service Coordination:  TBD    Supervision:  Every Wednesdays     Are Treatment Plan goals/objectives effective? Yes  *If no, list changes to treatment plan:    Are the current goals meeting client's needs? Yes  *If no, list the changes to treatment plan.    Client Input / Response: Patient admits his drinking became problem and expresses his desire to learn recovery skills.  Pt denies any cravings at this time.       *Client agrees with any changes to the treatment plan: N/A  *Client received copy of changes: N/A  *Client is aware of right to access a treatment plan review:  Yes

## 2022-03-22 ENCOUNTER — VIRTUAL VISIT (OUTPATIENT)
Dept: ADDICTION MEDICINE | Facility: CLINIC | Age: 37
End: 2022-03-22
Attending: FAMILY MEDICINE
Payer: COMMERCIAL

## 2022-03-22 DIAGNOSIS — F10.20 ALCOHOL USE DISORDER, SEVERE, DEPENDENCE (H): Primary | ICD-10-CM

## 2022-03-22 PROCEDURE — H2035 A/D TX PROGRAM, PER HOUR: HCPCS | Mod: HQ,GT,95

## 2022-03-22 NOTE — GROUP NOTE
Group Therapy Documentation    PATIENT'S NAME: Albert Kumar  MRN:   [id number]  :   1985  ACCT. NUMBER: [id number]  DATE OF SERVICE: 3/22/22  START TIME: 12:30 PM  END TIME:  3:15 PM  FACILITATOR(S): Jacky Nash LADC; Nirav Castro LADC  TOPIC: BEH Group Therapy  Number of patients attending the group: 3   Group Length:  3 hours    Group Therapy Type: Addiction and Psychoeducation    Summary of Group / Topics Discussed:  Introduction to DBT.  Application of Wise Mind to Relapse Prevention    Video Visit:      Provider verified identity through the following two step process.  Patient provided:  Patient is known previously to provider    Telemedicine Visit: The patient's condition can be safely assessed and treated via synchronous audio and visual telemedicine encounter.      Reason for Telemedicine Visit: Patient has requested telehealth visit    Originating Site (Patient Location): Patient's home    Distant Site (Provider Location): Provider Remote Setting- Home Office    Consent:  The patient/guardian has verbally consented to: the potential risks and benefits of telemedicine (video visit) versus in person care; bill my insurance or make self-payment for services provided; and responsibility for payment of non-covered services.     Patient would like the video invitation sent by:  Send to e-mail at: [email or web address]    Mode of Communication:  Video Conference via Medical Zoom    As the provider I attest to compliance with applicable laws and regulations related to telemedicine.  Group Attendance:  Attended group session    Patient's response to the group topic/interactions:  cooperative with task, discussed personal experience with topic, expressed readiness to alter behaviors, expressed understanding of topic, gave appropriate feedback to peers, listened actively and offered helpful suggestions to peers    Patient appeared to be Actively participating, Attentive and Engaged.         Client specific details:  Pt identified today's mood/emotion as 8 out of 10.  Pt states he is maintaining his recovery by running and exercise. Pt states he belongs to a running club at [employer] and run on Wednesday evenings.  Pt states he has been busy with work and has not had any cravings.  Pt also states he likes to watch sports on TV (gold, basketball, soccer, etc.) and he is able to keep his mind off alcohol.

## 2022-03-24 ENCOUNTER — VIRTUAL VISIT (OUTPATIENT)
Dept: ADDICTION MEDICINE | Facility: CLINIC | Age: 37
End: 2022-03-24
Attending: FAMILY MEDICINE
Payer: COMMERCIAL

## 2022-03-24 DIAGNOSIS — F10.20 ALCOHOL USE DISORDER, SEVERE, DEPENDENCE (H): Primary | ICD-10-CM

## 2022-03-24 PROCEDURE — H2035 A/D TX PROGRAM, PER HOUR: HCPCS | Mod: HQ,GT,95

## 2022-03-24 NOTE — GROUP NOTE
Video Visit: Zoom Group      Provider verified identity through the following two step process.  Patient provided:  Patient photo and Patient is known previously to provider    Telemedicine Visit: The patient's condition can be safely assessed and treated via synchronous audio and visual telemedicine encounter.      Reason for Telemedicine Visit: Patient has requested telehealth visit    Originating Site (Patient Location): Patient's home    Distant Site (Provider Location): [facility]    Consent:  The patient/guardian has verbally consented to: the potential risks and benefits of telemedicine (video visit) versus in person care; bill my insurance or make self-payment for services provided; and responsibility for payment of non-covered services.     Patient would like the video invitation sent by:  Send to e-mail at: [email or web address]    Mode of Communication:  Video Conference via Medical Zoom    As the provider I attest to compliance with applicable laws and regulations related to telemedicine.Group Therapy Documentation    PATIENT'S NAME: Albert Kumar  MRN:   [id number]  :   1985  ACCT. NUMBER: [id number]  DATE OF SERVICE: 3/24/22  START TIME: 12:30 PM  END TIME:  3:30 PM  FACILITATOR(S): Nirav Castro LADC  TOPIC: BEH Group Therapy  Number of patients attending the group:  4  Group Length:  3 Hours    Group Therapy Type: Psychoeducation    Summary of Group / Topics Discussed:    Risk Factors for Substance Abuse      Group Attendance:  Attended group session    Patient's response to the group topic/interactions:  cooperative with task, discussed personal experience with topic, expressed readiness to alter behaviors, expressed understanding of topic, gave appropriate feedback to peers and listened actively    Patient appeared to be Actively participating, Attentive and Engaged.        Client specific details:  Patient maintains his sobriety date as 2022, and  credits his being busy as one of the factors that helps his sobriety. Patient rates his emotional wellbeing at 8/10 and physical health at 9/10. He reveals that he grew up and schooled in [place], where it was very easy to get access to alcohol and drugs. Patient reports that those who sold alcohol were not required to check for age or anything, and therefore, anyone could purchase alcohol with ease. Patient reports multiple nationalities; [place], [place], and American, and says he has been able to travel a lot and encountered different people from different cultures. Patient credits his awareness and multicultural skills to the fact that he has traveled and worked in many different cities, such as [place], [place], [place], [facility], [place], and now, [place]. Patient states he was grateful today for this treatment and for being able to listen and learn from other group members. For the weekend, patient reports plans to work a little bit, watch basketball, and watch the Oscars.

## 2022-03-25 ENCOUNTER — DOCUMENTATION ONLY (OUTPATIENT)
Dept: ADDICTION MEDICINE | Facility: CLINIC | Age: 37
End: 2022-03-25
Payer: COMMERCIAL

## 2022-03-25 NOTE — PROGRESS NOTES
[facility] Weekly Treatment Plan Review      ATTENDANCE for the following date span:  3/19/2022 to 3/25/2022    Date Monday 3/21/22 Tuesday 03/22/22 Wednesday 03/23/22 Thursday 03/24/22 Friday 2022   Group Therapy NA 3.0 hours N/A 3.0 hours N/A   Individual Therapy Not buzz Not buzz N/A Not buzz  N/A   Family Therapy Not buzz Not buzz N/A Not buzz N/A   Other (Specify) Not buzz Not buzz N/A Not buzz N/A     Patient attended 2/2 groups during this time period.  Pt is scheduled to attend groups on  and  only.    Weekly Treatment Plan Review     Treatment Plan initiated on: 2022    Dimension1: Acute Intoxication/Withdrawal Potential -   Previous Dimension Ratin  Current Dimension Ratin  Date of Last Use 1/3/2022  Any reports of withdrawal symptoms - No    Narrative: Date of last use of alcohol-1/3/2022 in the evening.  At intake, patient reported after few days of sobriety, he was experiencing jitterly hands, some anxiety, sweat. However, patient did not report need for medical detox and states he was able to tolerate the symptoms. Since the intake, patient reports he is remaining sober and has not reported any issues with withdrawals.      Dimension 2: Biomedical Conditions & Complications -   Previous Dimension Ratin  Current Dimension Ratin  Medical Concerns:  None   Current Medications & Medication Changes:  Current Outpatient Medications   Medication     buPROPion (WELLBUTRIN) 75 MG tablet     Digital Therapy (RESET FOR IOS OR ANDROID JACQUELIN) MISC     fluticasone (FLONASE) 50 MCG/ACT nasal spray     No current facility-administered medications for this visit.     Medication Prescriber:  Joel Daniel Irwin Wegener MD, [facility]  Taking meds as prescribed? Yes  Medication side effects or concerns: No  Outside medical appointments this week (list provider and reason for visit):  None     Narratives: Pt reports his medical and dental health are well at this time.  Pt  has are and able to access health care.  Pt has established primary care with Dr. Wally David at [facility]. Patient continues to report good physical health;  involved with regular physical exercises, and eating healthy. Pt states he started jogging since starting outpatient treatment.  Pt states his goal for this weekend is to watch basketball and the [employer].      Dimension 3: Emotional/Behavioral Conditions & Complications -   Previous Dimension Ratin  Current Dimension Ratin  PHQ9     PHQ-9 SCORE 2022   PHQ-9 Total Score 23     GAD7     FAISAL-7 SCORE 2021   Total Score 13 (moderate anxiety) -   Total Score 13 21     Mental health diagnosis None   Date of last SIB:  never   Date of  last SI:  never  Date of last HI: never  Behavioral Targets:  N/A  Current MH Assignments:  N/A    Narrative:  At the time of intake, patient did not report past or current mental health diagnoses, however pt reports he is on anti-depression medication prescribed by his primary doctor. Pt states since starting outpatient treatment, he is going to gym more often and his mood has improved. At this time, patient is scheduled to attend 2 groups per week on  and . Patient did not report any mental health concerns this week. Pt states attending groups has been helpful with staying sober.  Pt shared more about his addiction and how he used to make risky decisions/behaviors, such as driving under the influence. Patient rates his emotional health at 8/10 this week, says he is grateful for the support he is getting from his girlfriend and the rest of his family.    Dimension 4: Treatment Acceptance / Resistance -   Previous Dimension Ratin  Current Dimension Ratin  ELIJAH Diagnosis:  Alcohol Use Disorder   303.90 (F10.20) Severe .  Stage - 1  Commitment to tx process/Stage of change- Action  ELIJAH assignments - Maintain internal motivation for recovery by attending  "groups and listening to fellow group members' addiction stories     Narrative - Patient seems motivated for treatment.  Pt reports there is no pressure/mandate from court/probation/legal issues and he decided to seek help on his term.  Pt states date of last use was on 1/3/2022 and had been sober for 4 days prior to the time of intake. Pt verbalizes drinking as a problem and his need to quit.  Pt emphasized benefits of treatment and states treatment is providing him with new knowledge and insight about life. Patient has completed 49 hours of treatment at this time. This week, patient participated in the discussion on Introduction to DBT: Application of Wise Mind to Relapse Prevention and Risk Factors for Substance Abuse. Patient states he grew in [place] where it was easy to have access to drugs/alcohol.    Dimension 5: Relapse / Continued Problem Potential -   Previous Dimension Ratin  Current Dimension Rating:  3  Relapses this week - None  Urges to use - None  UA results - No results found for this or any previous visit (from the past 168 hour(s)).    Narrative- Last use of alcohol on 1/3/2022.  This was the date of original intake but patient admits he relapsed on that day. Pt reports the longest sobriety in his lifetime as 5-6 months on his own.  He appeared to have had  little insight into his triggers at the time of intake, as evidenced by his statement. \"I can't put my fingers around it.\"  However, patient is beginning to express increased insight, as evidenced by his verbalizations of triggers and coping skills. Pt states he is remaining sober by staying in contact with his parents and getting good support form his girlfriend. He also reports work as one of the ways to help him remain focus and not think about drinking. Patient reports no changes in this area this week.      Dimension 6: Recovery Environment -  Previous Dimension Ratin  Current Dimension Ratin  Family Involvement -   Summarize " attendance at family groups and family sessions - N/A  Family supportive of treatment?  Yes    Community support group attendance - No  Recreational activities - exercise (30 min jogging a day), busy at work    Narrative -  Patient reports his current incomes comes from employment and investments. 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   Patient states he has college degree in [employer] and has been able to fulfill his occupational obligations.  He is currently living independently with his girlfriend and renting an apartment in [place]. Patient reports he kept his drinking to himself and none of his friends knew about the depth of his addiction or the fact that he was getting treatment.  Pt states his parents and girlfriends are supportive of his recovery.   Patent does not report any past or current legal issues. This is patient's very first ELIJAH treatment in lifetime. Pt is not engaged in any community sober support group; however emphasizes staying busy with work, and reports a goal to jog for at least 25 minutes everyday. Patient reports plan for the weekend is to work a little bit, watch basketball, and watch the [employer].    Justification for Continued Treatment at this Level of Care: Patient reports desire to learn coping skills. Last alcohol use on 1/3/2022    Discharge Planning:  Target Discharge Date/Timeframe:  7/7/2022   Med Mgmt Provider/Appt:  MARYCRUZ   Ind therapy Provider/Appt:  N/A   Family therapy Provider/Appt:  N/A   Other referrals:  N/A      Has vulnerable adult status change? No    Service Coordination:  TBD    Supervision:  Every Wednesdays     Are Treatment Plan goals/objectives effective? Yes  *If no, list changes to treatment plan:    Are the current goals meeting client's needs? Yes  *If no, list the changes to treatment plan.    Client Input / Response: Patient admits his drinking became problem and expresses his desire to learn recovery skills.  Pt denies any cravings at this time.        *Client agrees with any changes to the treatment plan: N/A  *Client received copy of changes: N/A  *Client is aware of right to access a treatment plan review: Yes

## 2022-03-29 ENCOUNTER — VIRTUAL VISIT (OUTPATIENT)
Dept: ADDICTION MEDICINE | Facility: CLINIC | Age: 37
End: 2022-03-29
Attending: FAMILY MEDICINE
Payer: COMMERCIAL

## 2022-03-29 DIAGNOSIS — F33.2 SEVERE EPISODE OF RECURRENT MAJOR DEPRESSIVE DISORDER, WITHOUT PSYCHOTIC FEATURES (H): ICD-10-CM

## 2022-03-29 DIAGNOSIS — F10.20 ALCOHOL USE DISORDER, SEVERE, DEPENDENCE (H): Primary | ICD-10-CM

## 2022-03-29 PROCEDURE — H2035 A/D TX PROGRAM, PER HOUR: HCPCS | Mod: HQ,GT,95

## 2022-03-29 NOTE — GROUP NOTE
Video Visit: Zoom Group from 12:30 PM to 2:15 PM.      Provider verified identity through the following two step process.  Patient provided:  Patient photo and Patient is known previously to provider    Telemedicine Visit: The patient's condition can be safely assessed and treated via synchronous audio and visual telemedicine encounter.      Reason for Telemedicine Visit: Patient has requested telehealth visit    Originating Site (Patient Location): Patient's home    Distant Site (Provider Location): [facility]: [facility]    Consent:  The patient/guardian has verbally consented to: the potential risks and benefits of telemedicine (video visit) versus in person care; bill my insurance or make self-payment for services provided; and responsibility for payment of non-covered services.     Patient would like the video invitation sent by:  Send to e-mail at: [email or web address]    Mode of Communication:  Video Conference via Medical Zoom    As the provider I attest to compliance with applicable laws and regulations related to telemedicine.Group Therapy Documentation    PATIENT'S NAME: Albert Kumar  MRN:   [id number]  :   1985  ACCT. NUMBER: [id number]  DATE OF SERVICE: 3/29/22  START TIME: 12:30 PM  END TIME:  3:20 PM  FACILITATOR(S): Nirav Castro LADC  TOPIC: BEH Group Therapy  Number of patients attending the group:  3  Group Length:  3 Hours    Group Therapy Type: Psychoeducation    Summary of Group / Topics Discussed:    Protective Factors for Substance Abuse      Group Attendance:  Attended group session    Patient's response to the group topic/interactions:  cooperative with task, discussed personal experience with topic, expressed readiness to alter behaviors, expressed understanding of topic, gave appropriate feedback to peers and listened actively    Patient appeared to be Actively participating, Attentive and Engaged.        Client specific details:  Patient attended  group for 2 hours and developed technical problems with zoom. He reports he was kicked out and unable to log back in. Patient reports sobriety since 1/7; and rates his emotional and physical health at 8/10. Patient reflected on his progress during the reading of daily meditations about honesty to himself and says being honest has helped him a lot. Patient says he does not see any reason to lie about his recovery because he will just be deceiving himself. Patient participated in the discussion about protective factors for drug abuse; says he was blessed to have multiple backgrounds; [place], [place], and American. Patient reports being well cared for while growing up, and was not exposed to many risk factors for drugs abuse.v He reports starting to drink alcohol as an adult because he did not realize the harm to his health and other resources.

## 2022-03-29 NOTE — TELEPHONE ENCOUNTER
Message left for pt saying questionnaire was sent to Fina and to call back to schedule follow up visit with provider, or if he now gets his medication ordered by mental health provider call and let us know.   Alexandra Charles RN

## 2022-03-31 ENCOUNTER — VIRTUAL VISIT (OUTPATIENT)
Dept: ADDICTION MEDICINE | Facility: CLINIC | Age: 37
End: 2022-03-31
Attending: FAMILY MEDICINE
Payer: COMMERCIAL

## 2022-03-31 DIAGNOSIS — F10.20 ALCOHOL USE DISORDER, SEVERE, DEPENDENCE (H): Primary | ICD-10-CM

## 2022-03-31 PROCEDURE — H2035 A/D TX PROGRAM, PER HOUR: HCPCS | Mod: HQ,GT,95

## 2022-03-31 NOTE — GROUP NOTE
Group Therapy Documentation    PATIENT'S NAME: Albert Kumar  MRN:   [id number]  :   1985  ACCT. NUMBER: [id number]  DATE OF SERVICE: 3/31/22  START TIME: 12:30 PM  END TIME:  3:30 PM  FACILITATOR(S): Jacky Nash LADC; Nirav Castro LADC  TOPIC: BEH Group Therapy  Number of patients attending the group:  3  Group Length:  3 hours    Group Therapy Type: Addiction and Psychoeducation    Summary of Group / Topics Discussed:    Strength in Recovery    Video Visit:      Provider verified identity through the following two step process.  Patient provided:  Patient is known previously to provider    Telemedicine Visit: The patient's condition can be safely assessed and treated via synchronous audio and visual telemedicine encounter.      Reason for Telemedicine Visit: Patient has requested telehealth visit    Originating Site (Patient Location): Patient's home    Distant Site (Provider Location): Provider Remote Setting- Home Office    Consent:  The patient/guardian has verbally consented to: the potential risks and benefits of telemedicine (video visit) versus in person care; bill my insurance or make self-payment for services provided; and responsibility for payment of non-covered services.     Patient would like the video invitation sent by:  Send to e-mail at: [email or web address]    Mode of Communication:  Video Conference via Medical Zoom    As the provider I attest to compliance with applicable laws and regulations related to telemedicine.  Group Attendance:  Attended group session    Patient's response to the group topic/interactions:  cooperative with task, discussed personal experience with topic, expressed reluctance to alter behavior, expressed understanding of topic, gave appropriate feedback to peers and listened actively    Patient appeared to be Actively participating, Attentive and Engaged.        Client specific details:  Pt reports he is sober since 21 and having no  cravings at this time.  Pt states he is managing goals in a smaller and realistic portion to manage his sobriety.  Pt shared fear of failure and self doubt used to hold him back but he is learning to let go of negating things.  Pt states he has successful old friends and being encouraged.

## 2022-04-01 ENCOUNTER — DOCUMENTATION ONLY (OUTPATIENT)
Dept: ADDICTION MEDICINE | Facility: CLINIC | Age: 37
End: 2022-04-01
Payer: COMMERCIAL

## 2022-04-01 NOTE — PROGRESS NOTES
[facility] Weekly Treatment Plan Review      ATTENDANCE for the following date span:  3/26/2022 to 2022    Date Monday 3/28/22 Tuesday 03/29/22 Wednesday 03/30/22 Thursday 03/31/22 Friday 2022   Group Therapy NA 2.0 hours N/A 3.0 hours N/A   Individual Therapy Not buzz Not buzz N/A Not buzz  N/A   Family Therapy Not buzz Not buzz N/A Not buzz N/A   Other (Specify) Not buzz Not buzz N/A Not buzz N/A     Patient attended 2/2 groups during this time period.  Pt is scheduled to attend groups on  and  only.    Weekly Treatment Plan Review     Treatment Plan initiated on: 2022    Dimension1: Acute Intoxication/Withdrawal Potential -   Previous Dimension Ratin  Current Dimension Ratin  Date of Last Use 1/3/2022  Any reports of withdrawal symptoms - No    Narrative: Date of last use of alcohol-1/3/2022 in the evening.  At intake, patient reported after few days of sobriety, he was experiencing jitterly hands, some anxiety, sweat. However, patient did not report need for medical detox and states he was able to tolerate the symptoms. Since the intake, patient reports he is remaining sober and has not reported any issues with withdrawals.      Dimension 2: Biomedical Conditions & Complications -   Previous Dimension Ratin  Current Dimension Ratin  Medical Concerns:  None   Current Medications & Medication Changes:  Current Outpatient Medications   Medication     buPROPion (WELLBUTRIN) 75 MG tablet     Digital Therapy (RESET FOR IOS OR ANDROID JACQUELIN) MISC     fluticasone (FLONASE) 50 MCG/ACT nasal spray     No current facility-administered medications for this visit.     Medication Prescriber:  Joel Daniel Irwin Wegener MD, [facility]  Taking meds as prescribed? Yes  Medication side effects or concerns: No  Outside medical appointments this week (list provider and reason for visit):  None     Narratives: Pt reports his medical and dental health are well at this time.  Pt  has are and able to access health care.  Pt has established primary care with Dr. Wally David at [facility]. Patient continues to report good physical health;  involved with regular physical exercises, and eating healthy. Pt states he started jogging since starting outpatient treatment, and feels his physical health is in good shape.    Dimension 3: Emotional/Behavioral Conditions & Complications -   Previous Dimension Ratin  Current Dimension Ratin  PHQ9     PHQ-9 SCORE 2022   PHQ-9 Total Score 23     GAD7     FAISAL-7 SCORE 2021   Total Score 13 (moderate anxiety) -   Total Score 13 21     Mental health diagnosis None   Date of last SIB:  never   Date of  last SI:  never  Date of last HI: never  Behavioral Targets:  N/A  Current MH Assignments:  N/A    Narrative:  At the time of intake, patient did not report past or current mental health diagnoses, however pt reports he is on anti-depression medication prescribed by his primary doctor. Pt states since starting outpatient treatment, he is going to gym more often and his mood has improved. At this time, patient is scheduled to attend 2 groups per week on  and . Patient did not report any mental health concerns this week. Pt states attending groups has been helpful with staying sober.  Pt shared more about his addiction and how he used to make risky decisions/behaviors, such as driving under the influence. Patient rates his emotional health at 9/10; says he has great family support and is getting connected with old childhood friends who are doing well in their professional careers, and he is being motivated by their achievements.    Dimension 4: Treatment Acceptance / Resistance -   Previous Dimension Ratin  Current Dimension Ratin  ELIJAH Diagnosis:  Alcohol Use Disorder   303.90 (F10.20) Severe .  Stage - 1  Commitment to tx process/Stage of change- Action  ELIJAH assignments - Maintain  "internal motivation for recovery by attending groups and listening to fellow group members' addiction stories     Narrative - Patient seems motivated for treatment.  Pt reports there is no pressure/mandate from court/probation/legal issues and he decided to seek help on his term.  Pt states date of last use was on 1/3/2022 and had been sober for 4 days prior to the time of intake. Pt verbalizes drinking as a problem and his need to quit.  Pt emphasized benefits of treatment and states treatment is providing him with new knowledge and insight about life. Patient has completed 60 hours of treatment as of 3/31/2022. This week, patient participated in the discussion on Protective Factors for Substance Abuse, and Strength in Recovery. Patient reports his values as, \"Optimism, Curiosity, and Humor.\"    Dimension 5: Relapse / Continued Problem Potential -   Previous Dimension Ratin  Current Dimension Rating:  3  Relapses this week - None  Urges to use - None  UA results - No results found for this or any previous visit (from the past 168 hour(s)).    Narrative- Last use of alcohol on 1/3/2022.  This was the date of original intake but patient admits he relapsed on that day. Pt reports the longest sobriety in his lifetime as 5-6 months on his own.  He appeared to have had  little insight into his triggers at the time of intake, as evidenced by his statement. \"I can't put my fingers around it.\"  However, patient is beginning to express increased insight, as evidenced by his verbalizations of triggers and coping skills. Pt states he is remaining sober by staying in contact with his parents and getting good support form his girlfriend. He also reports connecting with his childhood friends who are doing well in their careers, and that helps him to remain focus on his life goals.     Dimension 6: Recovery Environment -  Previous Dimension Ratin  Current Dimension Ratin  Family Involvement -   Summarize attendance " at family groups and family sessions - N/A  Family supportive of treatment?  Yes    Community support group attendance - No  Recreational activities - exercise (30 min jogging a day), busy at work    Narrative -  Patient reports his current incomes comes from employment and investments. 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   Patient states he has college degree in [employer] and has been able to fulfill his occupational obligations.  He is currently living independently with his girlfriend and renting an apartment in [place]. Patient reports he kept his drinking to himself and none of his friends knew about the depth of his addiction or the fact that he was getting treatment.  Pt states his parents and girlfriends are supportive of his recovery. Patent does not report any past or current legal issues. This is patient's very first ELIJAH treatment in lifetime. Pt is not engaged in any community sober support group; however emphasizes staying busy with work, and reports a goal to jog for at least 25 minutes everyday. Patient reports desire to become a professional golfer because he likes the lifestyle of traveling and seeing different places all over the world and. Patient reports dream vacation is to sail around the [place]    Justification for Continued Treatment at this Level of Care: Patient reports desire to learn coping skills. Last alcohol use on 1/3/2022    Discharge Planning:  Target Discharge Date/Timeframe:  7/7/2022   Med Mgmt Provider/Appt:  MARYCRUZ   Ind therapy Provider/Appt:  N/A   Family therapy Provider/Appt:  N/A   Other referrals:  N/A      Has vulnerable adult status change? No    Service Coordination:  TBD    Supervision:  Every Wednesdays     Are Treatment Plan goals/objectives effective? Yes  *If no, list changes to treatment plan:    Are the current goals meeting client's needs? Yes  *If no, list the changes to treatment plan.    Client Input / Response:  Patient admits his drinking became problem and expresses his desire to learn recovery skills.  Pt denies any cravings at this time.       *Client agrees with any changes to the treatment plan: N/A  *Client received copy of changes: N/A  *Client is aware of right to access a treatment plan review: Yes

## 2022-04-02 RX ORDER — BUPROPION HYDROCHLORIDE 75 MG/1
TABLET ORAL
Qty: 60 TABLET | Refills: 1 | Status: SHIPPED | OUTPATIENT
Start: 2022-04-02 | End: 2022-06-16

## 2022-04-05 ENCOUNTER — DOCUMENTATION ONLY (OUTPATIENT)
Dept: ADDICTION MEDICINE | Facility: HOSPITAL | Age: 37
End: 2022-04-05
Payer: COMMERCIAL

## 2022-04-05 NOTE — PROGRESS NOTES
"ABSENT NOTE:    Albert Kumar was absent from group 4/5/2022 . This absence was excused.   Patient emailed at 12:45 pm stating \" I have been in meetings all morning and have completely lost track of time. I meant to email you to let you know I am running late today. My apologies.\"  Patient was informed the group was too small to operate and ended early today at 1:30 PM. Patient is expected to be in group on 4/7/2022.     RONALDO Ravi  4/5/2022 , 1:42 PM        "

## 2022-04-06 ENCOUNTER — DOCUMENTATION ONLY (OUTPATIENT)
Dept: ADDICTION MEDICINE | Facility: HOSPITAL | Age: 37
End: 2022-04-06
Payer: COMMERCIAL

## 2022-04-06 NOTE — PROGRESS NOTES
Addiction Outpatient Weekly Clinical Staffing     Albert Kumar was staffed on 4/6/2022 . Albert Kumar was staffed on recovery strengths, barriers and treatment progress.     Staff present: Kimberly Bright [facility], Jacky Nash [facility], Erma Andrea [facility], Fiona Choudhury [facility], [facility], Satish Stratton [facility], Debbie Alva [facility] and Donald Welander, [facility]    Date: 4/6/2022 Time: 4:12 PM    Staff Signature: RONALDO Ravi

## 2022-04-07 ENCOUNTER — VIRTUAL VISIT (OUTPATIENT)
Dept: ADDICTION MEDICINE | Facility: CLINIC | Age: 37
End: 2022-04-07
Attending: FAMILY MEDICINE
Payer: COMMERCIAL

## 2022-04-07 DIAGNOSIS — F10.20 ALCOHOL USE DISORDER, SEVERE, DEPENDENCE (H): Primary | ICD-10-CM

## 2022-04-07 PROCEDURE — H2035 A/D TX PROGRAM, PER HOUR: HCPCS | Mod: HQ,GT,95

## 2022-04-07 NOTE — GROUP NOTE
Group Therapy Documentation    PATIENT'S NAME: Albert Kumar  MRN:   [id number]  :   1985  ACCT. NUMBER: [id number]  DATE OF SERVICE: 22  START TIME: 12:30 PM  END TIME:  3:15 PM  FACILITATOR(S): Jacky Nash LADC  TOPIC: BEH Group Therapy  Number of patients attending the group:  4  Group Length:  3 hours    Group Therapy Type: Addiction and Psychoeducation    Summary of Group / Topics Discussed:    Acceptance and Commitment Therapy: Leaves on the Streatm    Video Visit:      Provider verified identity through the following two step process.  Patient provided:  Patient is known previously to provider    Telemedicine Visit: The patient's condition can be safely assessed and treated via synchronous audio and visual telemedicine encounter.      Reason for Telemedicine Visit: Patient has requested telehealth visit    Originating Site (Patient Location): Patient's home    Distant Site (Provider Location): Provider Remote Setting- Home Office    Consent:  The patient/guardian has verbally consented to: the potential risks and benefits of telemedicine (video visit) versus in person care; bill my insurance or make self-payment for services provided; and responsibility for payment of non-covered services.     Patient would like the video invitation sent by:  Send to e-mail at: [email or web address]    Mode of Communication:  Video Conference via Medical Zoom    As the provider I attest to compliance with applicable laws and regulations related to telemedicine.  Group Attendance:  Attended group session for 1 hour from 12:30 to 1:30 pm     Patient's response to the group topic/interactions:  cooperative with task and gave appropriate feedback to peers    Patient appeared to be Engaged.        Client specific details:  Patient participated in group for 1 hour.  Pt had to leave due to work.  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 and there was a  meeting with city officials and local business leaders at 1:30 pm.  Pt entered back to Zoom for 10 min at 2:30 pm while group was doing meditation and left.

## 2022-04-08 ENCOUNTER — DOCUMENTATION ONLY (OUTPATIENT)
Dept: ADDICTION MEDICINE | Facility: HOSPITAL | Age: 37
End: 2022-04-08
Payer: COMMERCIAL

## 2022-04-08 NOTE — PROGRESS NOTES
[facility] Weekly Treatment Plan Review      ATTENDANCE for the following date span:  2022 to 2022    Date 22   Group Therapy NA 0 hours N/A 1.0 hours N/A   Individual Therapy Not buzz Not buzz N/A Not buzz  N/A   Family Therapy Not buzz Not buzz N/A Not buzz N/A   Other (Specify) Not buzz Not buzz N/A Not buzz N/A     Patient attended 1/2groups during this time period.  Patient reported on Tuesdayld be late for group due to work schedule, however the group was small and ended before he joined.  Patient attended Thursday group for 1 hour and there was a work conflict and left.      Weekly Treatment Plan Review     Treatment Plan initiated on: 2022    Dimension1: Acute Intoxication/Withdrawal Potential -   Previous Dimension Ratin  Current Dimension Ratin  Date of Last Use 1/3/2022  Any reports of withdrawal symptoms - No    Narrative: Date of last use of alcohol-1/3/2022 in the evening.  At intake, patient reported after few days of sobriety, he was experiencing jitterly hands, some anxiety, sweat. However, patient did not report need for medical detox and states he was able to tolerate the symptoms. Since the intake, patient reports he is remaining sober and has not reported any issues with withdrawals.      Dimension 2: Biomedical Conditions & Complications -   Previous Dimension Ratin  Current Dimension Ratin  Medical Concerns:  None   Current Medications & Medication Changes:  Current Outpatient Medications   Medication     buPROPion (WELLBUTRIN) 75 MG tablet     Digital Therapy (RESET FOR IOS OR ANDROID JACQUELIN) MISC     fluticasone (FLONASE) 50 MCG/ACT nasal spray     No current facility-administered medications for this visit.     Medication Prescriber:  Joel Daniel Irwin Wegener MD, [facility]  Taking meds as prescribed? Yes  Medication side effects or concerns: No  Outside medical appointments this  week (list provider and reason for visit):  None     Narratives: Pt reports his medical and dental health are well at this time.  Pt has [employer] and able to access health care.  Pt has established primary care with Dr. Wally David at [facility]. Patient continues to report good physical health;  involved with regular physical exercises, and eating healthy. Pt states he started jogging since starting outpatient treatment, and feels his physical health is in good shape.    Dimension 3: Emotional/Behavioral Conditions & Complications -   Previous Dimension Ratin  Current Dimension Ratin  PHQ9     PHQ-9 SCORE 2022   PHQ-9 Total Score 23     GAD7     FAISAL-7 SCORE 2021   Total Score 13 (moderate anxiety) -   Total Score 13 21     Mental health diagnosis None   Date of last SIB:  never   Date of  last SI:  never  Date of last HI: never  Behavioral Targets:  N/A  Current MH Assignments:  N/A    Narrative:  At the time of intake, patient did not report past or current mental health diagnoses, however pt reports he is on anti-depression medication prescribed by his primary doctor. Pt states since starting outpatient treatment, he is going to gym more often and his mood has improved. At this time, patient is scheduled to attend 2 groups per week on  and . Patient did not report any mental health concerns this week. Pt states attending groups has been helpful with staying sober.  Pt shared more about his addiction and how he used to make risky decisions/behaviors, such as driving under the influence. Patient rates his emotional health at 9/10; says he has great family support and is getting connected with old childhood friends who are doing well in their professional careers, and he is being motivated by their achievements.    Dimension 4: Treatment Acceptance / Resistance -   Previous Dimension Ratin  Current Dimension Ratin  ELIJAH Diagnosis:  Alcohol  "Use Disorder   303.90 (F10.20) Severe .  Stage - 1  Commitment to tx process/Stage of change- Action  ELIJAH assignments - Maintain internal motivation for recovery by attending groups and listening to fellow group members' addiction stories     Narrative - Patient seems motivated for treatment.  Pt reports there is no pressure/mandate from court/probation/legal issues and he decided to seek help on his term.  Patient continues to manage his life, work and treatment.  Pt states he is feeling stronger with his recovery and treatment has been helpful.        Dimension 5: Relapse / Continued Problem Potential -   Previous Dimension Ratin  Current Dimension Rating:  3  Relapses this week - None  Urges to use - None  UA results - No results found for this or any previous visit (from the past 168 hour(s)).    Narrative- Last use of alcohol on 1/3/2022.  This was the date of original intake but patient admits he relapsed on that day. Pt reports the longest sobriety in his lifetime as 5-6 months on his own.  He appeared to have had  little insight into his triggers at the time of intake, as evidenced by his statement. \"I can't put my fingers around it.\"  However, patient is beginning to express increased insight, as evidenced by his verbalizations of triggers and coping skills. Pt states he is remaining sober by staying in contact with his parents and getting good support form his girlfriend. He also reports connecting with his childhood friends who are doing well in their careers, and that helps him to remain focus on his life goals.     Dimension 6: Recovery Environment -  Previous Dimension Ratin  Current Dimension Ratin  Family Involvement -   Summarize attendance at family groups and family sessions - N/A  Family supportive of treatment?  Yes    Community support group attendance - No  Recreational activities - exercise (30 min jogging a day), busy at work    Narrative -  Patient reports his current incomes " comes from employment and investments. 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   Patient states he has college degree in [employer] and has been able to fulfill his occupational obligations.  He is currently living independently with his girlfriend and renting an apartment in [place]. Patient reports he kept his drinking to himself and none of his friends knew about the depth of his addiction or the fact that he was getting treatment.  Pt states his parents and girlfriends are supportive of his recovery. Patent does not report any past or current legal issues. This is patient's very first ELIJAH treatment in lifetime. Pt is not engaged in any community sober support group; however emphasizes staying busy with work, and reports a goal to jog for at least 25 minutes everyday. Patient reports desire to become a professional golfer because he likes the lifestyle of traveling and seeing different places all over the world and. Patient reports dream vacation is to sail around the [place]    Justification for Continued Treatment at this Level of Care: Patient reports desire to learn coping skills. Last alcohol use on 1/3/2022    Discharge Planning:  Target Discharge Date/Timeframe:  7/7/2022   Med Mgmt Provider/Appt:  TBD   Ind therapy Provider/Appt:  N/A   Family therapy Provider/Appt:  N/A   Other referrals:  N/A      Has vulnerable adult status change? No    Service Coordination:  TBD    Supervision:  Every Wednesdays     Are Treatment Plan goals/objectives effective? Yes  *If no, list changes to treatment plan:    Are the current goals meeting client's needs? Yes  *If no, list the changes to treatment plan.    Client Input / Response: Patient admits his drinking became problem and expresses his desire to learn recovery skills.  Pt denies any cravings at this time.       *Client agrees with any changes to the treatment plan: N/A  *Client received copy of changes: N/A  *Client is  aware of right to access a treatment plan review: Yes

## 2022-04-12 ENCOUNTER — VIRTUAL VISIT (OUTPATIENT)
Dept: ADDICTION MEDICINE | Facility: CLINIC | Age: 37
End: 2022-04-12
Attending: FAMILY MEDICINE
Payer: COMMERCIAL

## 2022-04-12 DIAGNOSIS — F10.20 ALCOHOL USE DISORDER, SEVERE, DEPENDENCE (H): ICD-10-CM

## 2022-04-12 PROCEDURE — H2035 A/D TX PROGRAM, PER HOUR: HCPCS | Mod: HQ,GT,95

## 2022-04-12 NOTE — GROUP NOTE
Group Therapy Documentation    PATIENT'S NAME: Albert Kumar  MRN:   [id number]  :   1985  ACCT. NUMBER: [id number]  DATE OF SERVICE: 22  START TIME: 12:30 PM  END TIME:  3:30 PM  FACILITATOR(S): Jacky Nash LADC  TOPIC: BEH Group Therapy  Number of patients attending the group:  5  Group Length:  3 hours     Group Therapy Type: Addiction and Psychoeducation    Summary of Group / Topics Discussed:    Authentic Self: Inside&Outside Box Exercise (Who I pretend to be outside and who I really am inside.)    Video Visit:      Provider verified identity through the following two step process.  Patient provided:  Patient is known previously to provider    Telemedicine Visit: The patient's condition can be safely assessed and treated via synchronous audio and visual telemedicine encounter.      Reason for Telemedicine Visit: Patient has requested telehealth visit    Originating Site (Patient Location): Patient's home    Distant Site (Provider Location): Provider Remote Setting- Home Office    Consent:  The patient/guardian has verbally consented to: the potential risks and benefits of telemedicine (video visit) versus in person care; bill my insurance or make self-payment for services provided; and responsibility for payment of non-covered services.     Patient would like the video invitation sent by:  Send to e-mail at: [email or web address]    Mode of Communication:  Video Conference via Medical Zoom    As the provider I attest to compliance with applicable laws and regulations related to telemedicine.    Group Attendance:  Attended group session    Patient's response to the group topic/interactions:  cooperative with task, discussed personal experience with topic, expressed readiness to alter behaviors, expressed understanding of topic, gave appropriate feedback to peers, listened actively and offered helpful suggestions to peers    Patient appeared to be Actively participating.         Client specific details:  .Patient identified today's mood/emotion as 9 out of 10.  Pt shared the history of his given name.  Pt states his family lineage goes back to 1066.  Pt shared he used to have outside persona where he pleased others.  Pt stated inside he was feeling fear for failure of not being able to exceed other's expectations.  Pt states he started to look at how this was destructive to his sobriety.  Pt seems he is making good progress.

## 2022-04-14 ENCOUNTER — VIRTUAL VISIT (OUTPATIENT)
Dept: ADDICTION MEDICINE | Facility: CLINIC | Age: 37
End: 2022-04-14
Attending: FAMILY MEDICINE
Payer: COMMERCIAL

## 2022-04-14 DIAGNOSIS — F10.20 ALCOHOL USE DISORDER, SEVERE, DEPENDENCE (H): Primary | ICD-10-CM

## 2022-04-14 PROCEDURE — H2035 A/D TX PROGRAM, PER HOUR: HCPCS | Mod: HQ,GT,95

## 2022-04-14 NOTE — GROUP NOTE
Group Therapy Documentation    PATIENT'S NAME: Albert Kumar  MRN:   [id number]  :   1985  ACCT. NUMBER: [id number]  DATE OF SERVICE: 22  START TIME: 12:30 PM  END TIME:  3:15 PM  FACILITATOR(S): Jacky Nash LADC  TOPIC: BEH Group Therapy  Number of patients attending the group:  5   Group Length:  3 hours    Group Therapy Type: Addiction and Psychoeducation    Summary of Group / Topics Discussed:    Spiritual Awakening/Turning Point in Recovery    Video Visit:      Provider verified identity through the following two step process.  Patient provided:  Patient is known previously to provider    Telemedicine Visit: The patient's condition can be safely assessed and treated via synchronous audio and visual telemedicine encounter.      Reason for Telemedicine Visit: Patient has requested telehealth visit    Originating Site (Patient Location): Patient's home    Distant Site (Provider Location): Provider Remote Setting- Home Office    Consent:  The patient/guardian has verbally consented to: the potential risks and benefits of telemedicine (video visit) versus in person care; bill my insurance or make self-payment for services provided; and responsibility for payment of non-covered services.     Patient would like the video invitation sent by:  Send to e-mail at: [email or web address]    Mode of Communication:  Video Conference via Medical Zoom    As the provider I attest to compliance with applicable laws and regulations related to telemedicine.  Group Attendance:  Attended group session for 1 hour from 12:30 to 1:15 pm     Patient's response to the group topic/interactions:  cooperative with task, discussed personal experience with topic, expressed readiness to alter behaviors, gave appropriate feedback to peers and listened actively    Patient appeared to be Actively participating.        Client specific details:  Patient was able to attend 1 hour of group today.  Pt has been talking  about spring golf trip with his friend to [facility].  Pt said his flight was at 3:35 pm th is afternoon.  Pt stayed in group till his ride arrived to take him to the airport.  Pt states that he is trying to find fun activities in sobriety and enjoy life.

## 2022-04-15 ENCOUNTER — DOCUMENTATION ONLY (OUTPATIENT)
Dept: ADDICTION MEDICINE | Facility: HOSPITAL | Age: 37
End: 2022-04-15
Payer: COMMERCIAL

## 2022-04-15 NOTE — PROGRESS NOTES
[facility] Weekly Treatment Plan Review      ATTENDANCE for the following date span:  2022 to 4/15/2022    Date Monday 4/11/22 Tuesday 4/12/22 Wednesday4/13/22 Thursday 4/14/22 Friday 4/15/2022   Group Therapy NA 3 hours N/A 1.0 hours N/A   Individual Therapy Not buzz Not buzz N/A Not buzz  N/A   Family Therapy Not buzz Not buzz N/A Not buzz N/A   Other (Specify) Not buzz Not buzz N/A Not buzz N/A     Patient attended all groups during this time period.     Weekly Treatment Plan Review     Treatment Plan itiated on: 2022    Dimension1: Acute Intoxication/Withdrawal Potential -   Previous Dimension Ratin  Current Dimension Ratin  Date of Last Use 1/3/2022  Any reports of withdrawal symptoms - No    Narrative: Date of last use of alcohol-1/3/2022 in the evening.  At intake, patient reported after few days of sobriety, he was experiencing jitterly hands, some anxiety, sweat. However, patient did not report need for medical detox and states he was able to tolerate the symptoms. Since the intake, patient reports he is remaining sober and has not reported any issues with withdrawals.      Dimension 2: Biomedical Conditions & Complications -   Previous Dimension Ratin  Current Dimension Ratin  Medical Concerns:  None   Current Medications & Medication Changes:  Current Outpatient Medications   Medication     buPROPion (WELLBUTRIN) 75 MG tablet     Digital Therapy (RESET FOR IOS OR ANDROID JACQUELIN) MISC     fluticasone (FLONASE) 50 MCG/ACT nasal spray     No current facility-administered medications for this visit.     Medication Prescriber:  Joel Daniel Irwin Wegener MD, [facility]  Taking meds as prescribed? Yes  Medication side effects or concerns: No  Outside medical appointments this week (list provider and reason for visit):  None     Narratives: Pt reports his medical and dental health are well at this time.  Pt has [employer] and able to access health care.  Pt has established primary care  with Dr. Wally David at [facility]. Patient continues to report good physical health;  involved with regular physical exercises, and eating healthy. Pt states he started jogging since starting outpatient treatment, and feels his physical health is in good shape.    Dimension 3: Emotional/Behavioral Conditions & Complications -   Previous Dimension Ratin  Current Dimension Ratin  PHQ9     PHQ-9 SCORE 2022   PHQ-9 Total Score 23     GAD7     FAISAL-7 SCORE 2021   Total Score 13 (moderate anxiety) -   Total Score 13 21     Mental health diagnosis None   Date of last SIB:  never   Date of  last SI:  never  Date of last HI: never  Behavioral Targets:  N/A  Current MH Assignments:  N/A    Narrative:  At the time of intake, patient did not report past or current mental health diagnoses, however pt reports he is on anti-depression medication prescribed by his primary doctor. Pt states since starting outpatient treatment, he is going to gym more often and his mood has improved. At this time, patient is scheduled to attend 2 groups per week on  and . Patient attended all groups this week, however he had to leave the Thursday group early. He had been planning on spring golf trip with his friends for months and had to leave group early to catch a flight on Thursday.  Patient participated well in group and has been fundamental part of the group, providing feedback to new comers and sharing his strength.       Dimension 4: Treatment Acceptance / Resistance -   Previous Dimension Ratin  Current Dimension Ratin  ELIJAH Diagnosis:  Alcohol Use Disorder   303.90 (F10.20) Severe .  Stage - 1  Commitment to tx process/Stage of change- Action  ELIJAH assignments - Maintain internal motivation for recovery by attending groups and listening to fellow group members' addiction stories     Narrative - Patient seems motivated for treatment.  Pt reports there is no  "pressure/mandate from court/probation/legal issues and he decided to seek help on his term.  Patient continues to manage his life, work and treatment.  Pt states he is feeling stronger with his recovery and treatment has been helpful.        Dimension 5: Relapse / Continued Problem Potential -   Previous Dimension Ratin  Current Dimension Rating:  3  Relapses this week - None  Urges to use - None  UA results - No results found for this or any previous visit (from the past 168 hour(s)).    Narrative- Last use of alcohol on 1/3/2022.  This was the date of original intake but patient admits he relapsed on that day. Pt reports the longest sobriety in his lifetime as 5-6 months on his own.  He appeared to have had  little insight into his triggers at the time of intake, as evidenced by his statement. \"I can't put my fingers around it.\"  However, patient is beginning to express increased insight, as evidenced by his verbalizations of triggers and coping skills. Pt states he is remaining sober by staying in contact with his parents and getting good support form his girlfriend. He also reports connecting with his childhood friends who are doing well in their careers, and that helps him to remain focus on his life goals.     Dimension 6: Recovery Environment -  Previous Dimension Ratin  Current Dimension Ratin  Family Involvement -   Summarize attendance at family groups and family sessions - N/A  Family supportive of treatment?  Yes    Community support group attendance - No  Recreational activities - exercise (30 min jogging a day), busy at work    Narrative -  Patient reports his current incomes comes from employment and investments. 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   Patient states he has college degree in [employer] and has been able to fulfill his occupational obligations.  He is currently living independently with his girlfriend and renting an apartment in [place]. Patient reports he " kept his drinking to himself and none of his friends knew about the depth of his addiction or the fact that he was getting treatment.  Pt states his parents and girlfriends are supportive of his recovery. Patent does not report any past or current legal issues. This is patient's very first ELIJAH treatment in lifetime. Pt is not engaged in any community sober support group; however emphasizes staying busy with work, and reports a goal to jog for at least 25 minutes everyday. Patient reports desire to become a professional golfer because he likes the lifestyle of traveling and seeing different places all over the world and. Patient reports dream vacation is to sail around the [place]    Justification for Continued Treatment at this Level of Care: Patient reports desire to learn coping skills. Last alcohol use on 1/3/2022    Discharge Planning:  Target Discharge Date/Timeframe:  7/7/2022   Med Mgmt Provider/Appt:  MARYCRUZ   Ind therapy Provider/Appt:  N/A   Family therapy Provider/Appt:  N/A   Other referrals:  N/A      Has vulnerable adult status change? No    Service Coordination:  TBD    Supervision:  Every Wednesdays     Are Treatment Plan goals/objectives effective? Yes  *If no, list changes to treatment plan:    Are the current goals meeting client's needs? Yes  *If no, list the changes to treatment plan.    Client Input / Response: Patient admits his drinking became problem and expresses his desire to learn recovery skills.  Pt denies any cravings at this time.       *Client agrees with any changes to the treatment plan: N/A  *Client received copy of changes: N/A  *Client is aware of right to access a treatment plan review: Yes

## 2022-04-19 ENCOUNTER — DOCUMENTATION ONLY (OUTPATIENT)
Dept: ADDICTION MEDICINE | Facility: HOSPITAL | Age: 37
End: 2022-04-19
Payer: COMMERCIAL

## 2022-04-19 NOTE — PROGRESS NOTES
"ABSENT NOTE:    D) Albert AVERY Kumar was absent from group 4/19/2022 . This absence was excused.   A) Patient left an email to counselor at 11:18 am with the following email: \" I cannot make today as I have a closing on a property, that I need to attend in person. Older person, so doing everything with paper signing. Sorry to miss today.\"   T)  Patient is expected to be in group on 4/21/22.       Jacky Nash [facility]  4/19/2022 , 3:44 PM        "

## 2022-04-21 ENCOUNTER — DOCUMENTATION ONLY (OUTPATIENT)
Dept: ADDICTION MEDICINE | Facility: HOSPITAL | Age: 37
End: 2022-04-21
Payer: COMMERCIAL

## 2022-04-22 ENCOUNTER — DOCUMENTATION ONLY (OUTPATIENT)
Dept: ADDICTION MEDICINE | Facility: HOSPITAL | Age: 37
End: 2022-04-22
Payer: COMMERCIAL

## 2022-04-22 NOTE — PROGRESS NOTES
Weekly Progress Note 4/16/2022 to 4/22/2022  Albert Kumar  1985  [id number]      D) Pt attended ZERO groups  this week with 2 absences. Unable to assess along six dimensions or for [facility] due to lack of attendance.   Jacky Nash MA [facility] 4/22/2022 2 pm

## 2022-04-22 NOTE — PROGRESS NOTES
"ABSENT NOTE:    Albert Kumar was absent from group 4/21/2022 . This absence was not excused. Patient reached out before group at 10 am and reported \"I have an appointment until about 1:30pm today. And then try and get to check in and final thoughts/meditation.\"   He did not attend group today.     Patient is expected to be in group next week on Tuesday and Thursday.     RONALDO Ravi  4/21/2022 ,  4:30 PM        "

## 2022-04-25 ENCOUNTER — DOCUMENTATION ONLY (OUTPATIENT)
Dept: ADDICTION MEDICINE | Facility: HOSPITAL | Age: 37
End: 2022-04-25
Payer: COMMERCIAL

## 2022-04-25 NOTE — PROGRESS NOTES
Treatment Progress with patient    D)Communicationg back and forth with the patient about his treatment progress.   A) He has 65 hours of treatment so far.  He does not have any legal issues and mo mandate from court.  He seems he has made good progress so far. Counselor suggested if he would drop down to phase 3 and attend 1 group per week.    T)Patient agreed and chose to attend Tuesday groups only moving forward.      Jacky Nash MA [facility] 4/25/2022  11 am

## 2022-04-26 ENCOUNTER — VIRTUAL VISIT (OUTPATIENT)
Dept: ADDICTION MEDICINE | Facility: CLINIC | Age: 37
End: 2022-04-26
Attending: FAMILY MEDICINE
Payer: COMMERCIAL

## 2022-04-26 DIAGNOSIS — F10.20 ALCOHOL USE DISORDER, SEVERE, DEPENDENCE (H): Primary | ICD-10-CM

## 2022-04-26 PROCEDURE — H2035 A/D TX PROGRAM, PER HOUR: HCPCS | Mod: HQ,GT,95

## 2022-04-26 NOTE — GROUP NOTE
Group Therapy Documentation    PATIENT'S NAME: Albert Kumar  MRN:   [id number]  :   1985  ACCT. NUMBER: [id number]  DATE OF SERVICE: 22  START TIME: 12:30 PM  END TIME:  3:30 PM  FACILITATOR(S): Jacky Nash LADC  TOPIC: BEH Group Therapy  Number of patients attending the group: 5  Group Length:  3 hours    Group Therapy Type: Addiction, Psychoeducation, and Skills/Education    Summary of Group / Topics Discussed:    Communication:Passive, Assertive, Aggressive Communication.  How to improve communication.      Video Visit:      Provider verified identity through the following two step process.  Patient provided:  Patient is known previously to provider    Telemedicine Visit: The patient's condition can be safely assessed and treated via synchronous audio and visual telemedicine encounter.      Reason for Telemedicine Visit: Patient has requested telehealth visit    Originating Site (Patient Location): Patient's home    Distant Site (Provider Location): Provider Remote Setting- Home Office    Consent:  The patient/guardian has verbally consented to: the potential risks and benefits of telemedicine (video visit) versus in person care; bill my insurance or make self-payment for services provided; and responsibility for payment of non-covered services.     Patient would like the video invitation sent by:  Send to e-mail at: [email or web address]    Mode of Communication:  Video Conference via Medical Zoom    As the provider I attest to compliance with applicable laws and regulations related to telemedicine.  Group Attendance:  Attended group session    Patient's response to the group topic/interactions:  cooperative with task, discussed personal experience with topic, expressed readiness to alter behaviors, expressed understanding of topic, gave appropriate feedback to peers and offered helpful suggestions to peers    Patient appeared to be Actively participating.        Client specific  details:  Patient identified today's mood/emotion as 9 out of 10.  When asked if he has any anxiety/fear he overcame recently, pt said he took on a big task and wondered if he can do it.  Pet explained what the project was.  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  Pt states the task is big but he is doing it step by step and excite about the future.

## 2022-04-29 ENCOUNTER — DOCUMENTATION ONLY (OUTPATIENT)
Dept: ADDICTION MEDICINE | Facility: HOSPITAL | Age: 37
End: 2022-04-29
Payer: COMMERCIAL

## 2022-04-29 NOTE — PROGRESS NOTES
[facility] [place] Weekly Treatment Plan Review      ATTENDANCE for the following date span:  2022 to 2022    Date 22   Group Therapy NA 3 hours N/A NA N/A   Individual Therapy Not buzz Not buzz N/A Not buzz  N/A   Family Therapy Not buzz Not buzz N/A Not buzz N/A   Other (Specify) Not buzz Not buzz N/A Not buzz N/A     Patient attended  group this week without any absences.  Pt is at phase 3 and only scheduled for Tuesday groups.     Weekly Treatment Plan Review     Treatment Plan itiated on: 2022    Dimension1: Acute Intoxication/Withdrawal Potential -   Previous Dimension Ratin  Current Dimension Ratin  Date of Last Use 1/3/2022  Any reports of withdrawal symptoms - No    Narrative: Date of last use of alcohol-1/3/2022 in the evening.  At intake, patient reported after few days of sobriety, he was experiencing jitterly hands, some anxiety, sweat. However, patient did not report need for medical detox and states he was able to tolerate the symptoms. Since the intake, patient reports he is remaining sober and has not reported any issues with withdrawals.      Dimension 2: Biomedical Conditions & Complications -   Previous Dimension Ratin  Current Dimension Ratin  Medical Concerns:  None   Current Medications & Medication Changes:  Current Outpatient Medications   Medication     buPROPion (WELLBUTRIN) 75 MG tablet     Digital Therapy (RESET FOR IOS OR ANDROID JACQUELIN) MISC     fluticasone (FLONASE) 50 MCG/ACT nasal spray     No current facility-administered medications for this visit.     Medication Prescriber:  Joel Daniel Irwin Wegener MD, [facility]  Taking meds as prescribed? Yes  Medication side effects or concerns: No  Outside medical appointments this week (list provider and reason for visit):  None     Narratives: Pt reports his medical and dental health are well at this time.  Pt has [employer] and able  to access health care.  Pt has established primary care with Dr. Wally David at [facility]. Patient continues to report good physical health;  involved with regular physical exercises, and eating healthy. Pt states he started jogging since starting outpatient treatment, and feels his physical health is in good shape.    Dimension 3: Emotional/Behavioral Conditions & Complications -   Previous Dimension Ratin  Current Dimension Ratin  PHQ9     PHQ-9 SCORE 2022   PHQ-9 Total Score 23     GAD7     FAISAL-7 SCORE 2021   Total Score 13 (moderate anxiety) -   Total Score 13 21     Mental health diagnosis None   Date of last SIB:  never   Date of  last SI:  never  Date of last HI: never  Behavioral Targets:  N/A  Current MH Assignments:  N/A    Narrative:  At the time of intake, patient did not report past or current mental health diagnoses, however pt reports he is on anti-depression medication prescribed by his primary doctor. Pt states since starting outpatient treatment, he is going to gym more often and his mood has improved. Patient moved down to phase 3 and only scheduled for Tuesday groups.  Pt reports he recently purchased an old [employer]  building with an intent to turn into a commercial building. Pt shared his excitement.   Pt continues to engage in group well.  Pt was educated on communication styles.        Dimension 4: Treatment Acceptance / Resistance -   Previous Dimension Ratin  Current Dimension Ratin  ELIJAH Diagnosis:  Alcohol Use Disorder   303.90 (F10.20) Severe .  Stage - 1  Commitment to tx process/Stage of change- Action  ELIJAH assignments - Maintain internal motivation for recovery by attending groups and listening to fellow group members' addiction stories     Narrative - Patient seems motivated for treatment.  Pt reports there is no pressure/mandate from court/probation/legal issues and he decided to seek help on his term.  Patient  "continues to manage his life, work and treatment.  Pt states he is feeling stronger with his recovery and treatment has been helpful.        Dimension 5: Relapse / Continued Problem Potential -   Previous Dimension Ratin  Current Dimension Rating:  3  Relapses this week - None  Urges to use - None  UA results - No results found for this or any previous visit (from the past 168 hour(s)).    Narrative- Last use of alcohol on 1/3/2022.  This was the date of original intake but patient admits he relapsed on that day. Pt reports the longest sobriety in his lifetime as 5-6 months on his own.  He appeared to have had  little insight into his triggers at the time of intake, as evidenced by his statement. \"I can't put my fingers around it.\"  However, patient is beginning to express increased insight, as evidenced by his verbalizations of triggers and coping skills. Pt states he is remaining sober by staying in contact with his parents and getting good support form his girlfriend. He also reports connecting with his childhood friends who are doing well in their careers, and that helps him to remain focus on his life goals.     Dimension 6: Recovery Environment -  Previous Dimension Ratin  Current Dimension Ratin  Family Involvement -   Summarize attendance at family groups and family sessions - N/A  Family supportive of treatment?  Yes    Community support group attendance - No  Recreational activities - exercise (30 min jogging a day), busy at work    Narrative -  Patient reports his current incomes comes from employment and investments. 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   Patient states he has college degree in [employer] communication and has been able to fulfill his occupational obligations.  He is currently living independently with his girlfriend and renting an apartment in [place]. Patient reports he kept his drinking to himself and none of his friends knew about the depth of his addiction or the fact " that he was getting treatment.  Pt states his parents and girlfriends are supportive of his recovery. Patent does not report any past or current legal issues. This is patient's very first ELIJAH treatment in lifetime. Pt is not engaged in any community sober support group; however emphasizes staying busy with work, and reports a goal to jog for at least 25 minutes everyday. Patient reports desire to become a professional golfer because he likes the lifestyle of traveling and seeing different places all over the world and. Patient reports dream vacation is to sail around [place]    Justification for Continued Treatment at this Level of Care: Patient reports desire to learn coping skills. Last alcohol use on 1/3/2022    Discharge Planning:  Target Discharge Date/Timeframe:  7/7/2022   Med Mgmt Provider/Appt:  MARYCRUZ   Ind therapy Provider/Appt:  N/A   Family therapy Provider/Appt:  N/A   Other referrals:  N/A      Has vulnerable adult status change? No    Service Coordination:  TBD    Supervision:  Every Wednesdays     Are Treatment Plan goals/objectives effective? Yes  *If no, list changes to treatment plan:    Are the current goals meeting client's needs? Yes  *If no, list the changes to treatment plan.    Client Input / Response: Patient admits his drinking became problem and expresses his desire to learn recovery skills.  Pt denies any cravings at this time.       *Client agrees with any changes to the treatment plan: N/A  *Client received copy of changes: N/A  *Client is aware of right to access a treatment plan review: Yes

## 2022-05-03 ENCOUNTER — VIRTUAL VISIT (OUTPATIENT)
Dept: ADDICTION MEDICINE | Facility: CLINIC | Age: 37
End: 2022-05-03
Attending: FAMILY MEDICINE
Payer: COMMERCIAL

## 2022-05-03 DIAGNOSIS — F10.20 ALCOHOL USE DISORDER, SEVERE, DEPENDENCE (H): Primary | ICD-10-CM

## 2022-05-03 PROCEDURE — H2035 A/D TX PROGRAM, PER HOUR: HCPCS | Mod: HQ,GT,95

## 2022-05-03 NOTE — GROUP NOTE
Group Therapy Documentation    PATIENT'S NAME: Albert Kumar  MRN:   [id number]  :   1985  ACCT. NUMBER: [id number]  DATE OF SERVICE: 22  START TIME: 12:30 PM  END TIME:  3:30 PM  FACILITATOR(S): Jacky Nash LADC  TOPIC: BEH Group Therapy  Number of patients attending the group:  5  Group Length:  3 hours     Group Therapy Type: Addiction, Psychoeducation, and Skills/Education    Summary of Group / Topics Discussed:    Family Dynamics     Video Visit:      Provider verified identity through the following two step process.  Patient provided:  Patient is known previously to provider    Telemedicine Visit: The patient's condition can be safely assessed and treated via synchronous audio and visual telemedicine encounter.      Reason for Telemedicine Visit: Patient has requested telehealth visit    Originating Site (Patient Location): Patient's home    Distant Site (Provider Location): Provider Remote Setting- Home Office    Consent:  The patient/guardian has verbally consented to: the potential risks and benefits of telemedicine (video visit) versus in person care; bill my insurance or make self-payment for services provided; and responsibility for payment of non-covered services.     Patient would like the video invitation sent by:  Send to e-mail at: [email or web address]    Mode of Communication:  Video Conference via Medical Zoom    As the provider I attest to compliance with applicable laws and regulations related to telemedicine.  Group Attendance:  Attended group session    Patient's response to the group topic/interactions:  cooperative with task, discussed personal experience with topic, expressed readiness to alter behaviors, gave appropriate feedback to peers, listened actively and offered helpful suggestions to peers    Patient appeared to be Actively participating and Engaged.        Client specific details:  Pt identified today's mood/emotion as 9 out of 10.  Pt states  "running/jogging have been very helpful in his sobriety.  Pt was educated on dysfunctional family roles in addiction.  Pt states his older sister was a drinker and he identified himself as part of the dysfunction he developed to adopt to the malfunction.  Pt also share he became the \"addict\" in the family and how he have made amends to his family        "

## 2022-05-04 ENCOUNTER — DOCUMENTATION ONLY (OUTPATIENT)
Dept: ADDICTION MEDICINE | Facility: HOSPITAL | Age: 37
End: 2022-05-04
Payer: COMMERCIAL

## 2022-05-04 NOTE — PROGRESS NOTES
Addiction Outpatient Weekly Clinical Staffing     Albert Kumar was staffed on 5/4/2022 . Albert Kumra was staffed on recovery strengths, barriers and treatment progress.     Staff present: Kimberly Bright [facility], KANG Ravi, Fiona Choudhury [facility], [facility], Satish Stratton [facility], Debbie Alva [facility] and Donald Welander, [facility]    Date: 5/4/2022 Time: 4:18 PM    Staff Signature: RONALDO Ravi

## 2022-05-06 ENCOUNTER — DOCUMENTATION ONLY (OUTPATIENT)
Dept: ADDICTION MEDICINE | Facility: HOSPITAL | Age: 37
End: 2022-05-06
Payer: COMMERCIAL

## 2022-05-06 NOTE — PROGRESS NOTES
[facility] Weekly Treatment Plan Review      ATTENDANCE for the following date span:  2022 to 2022    Date Monday 5/2/22 Tuesday 5/3/22 Wednesday5/4/22 Thursday  5/5/22 Friday   2022   Group Therapy NA 3 hours N/A NA N/A   Individual Therapy Not buzz Not buzz N/A Not buzz  N/A   Family Therapy Not buzz Not buzz N/A Not buzz N/A   Other (Specify) Not buzz Not buzz N/A Not buzz N/A     Patient attended  group this week without any absences.  Pt is at phase 3 and only scheduled for Tuesday groups.     Weekly Treatment Plan Review     Treatment Plan itiated on: 2022    Dimension1: Acute Intoxication/Withdrawal Potential -   Previous Dimension Ratin  Current Dimension Ratin  Date of Last Use 1/3/2022  Any reports of withdrawal symptoms - No    Narrative: Date of last use of alcohol-1/3/2022 in the evening.  At intake, patient reported after few days of sobriety, he was experiencing jitterly hands, some anxiety, sweat. However, patient did not report need for medical detox and states he was able to tolerate the symptoms. Since the intake, patient reports he is remaining sober and has not reported any issues with withdrawals.      Dimension 2: Biomedical Conditions & Complications -   Previous Dimension Ratin  Current Dimension Ratin  Medical Concerns:  None   Current Medications & Medication Changes:  Current Outpatient Medications   Medication     buPROPion (WELLBUTRIN) 75 MG tablet     Digital Therapy (RESET FOR IOS OR ANDROID JACQUELIN) MISC     fluticasone (FLONASE) 50 MCG/ACT nasal spray     No current facility-administered medications for this visit.     Medication Prescriber:  Joel Daniel Irwin Wegener MD, [facility]  Taking meds as prescribed? Yes  Medication side effects or concerns: No  Outside medical appointments this week (list provider and reason for visit):  None     Narratives: Pt reports his medical and dental health are well at this time.  Pt has [facility] and able to  access health care.  Pt has established primary care with Dr. Wally David at [facility]. Patient continues to report good physical health;  involved with regular physical exercises, and eating healthy. Pt states he started jogging since starting outpatient treatment, and feels his physical health is in good shape.    Dimension 3: Emotional/Behavioral Conditions & Complications -   Previous Dimension Ratin  Current Dimension Ratin  PHQ9     PHQ-9 SCORE 2022   PHQ-9 Total Score 23     GAD7     FAISAL-7 SCORE 2021   Total Score 13 (moderate anxiety) -   Total Score 13 21     Mental health diagnosis None   Date of last SIB:  never   Date of  last SI:  never  Date of last HI: never  Behavioral Targets:  N/A  Current MH Assignments:  N/A    Narrative:  At the time of intake, patient did not report past or current mental health diagnoses, however pt reports he is on anti-depression medication prescribed by his primary doctor. Pt states since starting outpatient treatment, he is going to gym more often and his mood has improved. Patient moved down to phase 3 and only scheduled for Tuesday groups.  Pt reports he recently purchased an old [employer]  building with an intent to turn into a commercial building. Pt shared his excitement.   Pt continues to engage in group well.        Dimension 4: Treatment Acceptance / Resistance -   Previous Dimension Ratin  Current Dimension Ratin  ELIJAH Diagnosis:  Alcohol Use Disorder   303.90 (F10.20) Severe .  Stage - 1  Commitment to tx process/Stage of change- Action  ELIJAH assignments - Maintain internal motivation for recovery by attending groups and listening to fellow group members' addiction stories     Narrative - Patient seems motivated for treatment.  Pt reports there is no pressure/mandate from court/probation/legal issues and he decided to seek help on his term.  Patient continues to manage his life, work and treatment.   "Pt states he is feeling stronger with his recovery and treatment has been helpful.        Dimension 5: Relapse / Continued Problem Potential -   Previous Dimension Ratin  Current Dimension Rating:  3  Relapses this week - None  Urges to use - None  UA results - No results found for this or any previous visit (from the past 168 hour(s)).    Narrative- Last use of alcohol on 1/3/2022.  This was the date of original intake but patient admits he relapsed on that day. Pt reports the longest sobriety in his lifetime as 5-6 months on his own.  He appeared to have had  little insight into his triggers at the time of intake, as evidenced by his statement. \"I can't put my fingers around it.\"  However, patient is beginning to express increased insight, as evidenced by his verbalizations of triggers and coping skills. Pt states he is remaining sober by staying in contact with his parents and getting good support form his girlfriend. He also reports connecting with his childhood friends who are doing well in their careers, and that helps him to remain focus on his life goals.     Dimension 6: Recovery Environment -  Previous Dimension Ratin  Current Dimension Ratin  Family Involvement -   Summarize attendance at family groups and family sessions - N/A  Family supportive of treatment?  Yes    Community support group attendance - No  Recreational activities - exercise (30 min jogging a day), busy at work    Narrative -  Patient reports his current incomes comes from employment and investments. 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   Patient states he has college degree in [employer] communication and has been able to fulfill his occupational obligations.  He is currently living independently with his girlfriend and renting an apartment in [place]. Patient reports he kept his drinking to himself and none of his friends knew about the depth of his addiction or the fact that he was getting treatment.  Pt states his " parents and girlfriends are supportive of his recovery. Patent does not report any past or current legal issues. This is patient's very first ELIJAH treatment in lifetime. Pt is not engaged in any community sober support group; however emphasizes staying busy with work, and reports a goal to jog for at least 25 minutes everyday. Patient reports desire to become a professional golfer because he likes the lifestyle of traveling and seeing different places all over the world and. Patient reports dream vacation is to sail around the [place]    Justification for Continued Treatment at this Level of Care: Patient reports desire to learn coping skills. Last alcohol use on 1/3/2022    Discharge Planning:  Target Discharge Date/Timeframe:  7/7/2022   Med Mgmt Provider/Appt:  MARYCRUZ   Ind therapy Provider/Appt:  N/A   Family therapy Provider/Appt:  N/A   Other referrals:  N/A      Has vulnerable adult status change? No    Service Coordination:  TBD    Supervision:  Every Wednesdays     Are Treatment Plan goals/objectives effective? Yes  *If no, list changes to treatment plan:    Are the current goals meeting client's needs? Yes  *If no, list the changes to treatment plan.    Client Input / Response: Patient admits his drinking became problem and expresses his desire to learn recovery skills.  Pt denies any cravings at this time.       *Client agrees with any changes to the treatment plan: N/A  *Client received copy of changes: N/A  *Client is aware of right to access a treatment plan review: Yes

## 2022-05-10 ENCOUNTER — DOCUMENTATION ONLY (OUTPATIENT)
Dept: ADDICTION MEDICINE | Facility: HOSPITAL | Age: 37
End: 2022-05-10
Payer: COMMERCIAL

## 2022-05-10 NOTE — PROGRESS NOTES
"ABSENT NOTE:    Albert Kumar was absent from group 5/10/2022 . This absence was excused. 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 Is it possible to attend Thursdays session instead?\"  Pt was informed he can attend the Thursday group instead.      Pt  is expected to be in group on 5/12/2022.       RONALDO Ravi  5/10/2022 , 12:28 PM        "

## 2022-05-12 ENCOUNTER — VIRTUAL VISIT (OUTPATIENT)
Dept: ADDICTION MEDICINE | Facility: CLINIC | Age: 37
End: 2022-05-12
Attending: FAMILY MEDICINE
Payer: COMMERCIAL

## 2022-05-12 DIAGNOSIS — F10.20 ALCOHOL USE DISORDER, SEVERE, DEPENDENCE (H): Primary | ICD-10-CM

## 2022-05-12 PROCEDURE — H2035 A/D TX PROGRAM, PER HOUR: HCPCS | Mod: HQ,GT,95

## 2022-05-12 NOTE — GROUP NOTE
Group Therapy Documentation    PATIENT'S NAME: Albert Kumar  MRN:   [id number]  :   1985  ACCT. NUMBER: [id number]  DATE OF SERVICE: 22  START TIME: 12:30 PM  END TIME:  3:15 PM  FACILITATOR(S): Jacky Nash LADC  TOPIC: BEH Group Therapy  Number of patients attending the group: 3   Group Length: 3 hours    Group Therapy Type: Addiction and Psychoeducation    Summary of Group / Topics Discussed:    Psychoeducation/Skills Cognitive Restructuring (ELIJAH)  This topic will give a general overview of maladaptive patterns of thinking and techniques to change their thinking patterns to be supportive of health and recovery.    Objective(s):     Patients will identify three cognitive distortions    Patients will identify two ways to dispute distortions    Structure (modalities, homework, worksheets, etc):     Provide psychoeducation on cognitive distortions and disputations    Facilitate group discussion around each patient s cognitive distortions and impact of those thinking patterns    Expected therapeutic outcome(s):   Patient will:    Recognize and dispute distortions    Experience improved thinking and behavior    Therapeutic outcome(s) measured by:     Patient will name 2-3 cognitive distortions and ways to dispute them    Video Visit:      Provider verified identity through the following two step process.  Patient provided:  Patient is known previously to provider    Telemedicine Visit: The patient's condition can be safely assessed and treated via synchronous audio and visual telemedicine encounter.      Reason for Telemedicine Visit: Patient has requested telehealth visit    Originating Site (Patient Location): Patient's home    Distant Site (Provider Location): Provider Remote Setting- Home Office    Consent:  The patient/guardian has verbally consented to: the potential risks and benefits of telemedicine (video visit) versus in person care; bill my insurance or make self-payment for services  provided; and responsibility for payment of non-covered services.     Patient would like the video invitation sent by:  Send to e-mail at: [email or web address]    Mode of Communication:  Video Conference via Medical Zoom    As the provider I attest to compliance with applicable laws and regulations related to telemedicine.  Group Attendance:  Attended group session    Patient's response to the group topic/interactions:  cooperative with task, discussed personal experience with topic, expressed understanding of topic, gave appropriate feedback to peers, listened actively and offered helpful suggestions to peers    Patient appeared to be Actively participating.        Client specific details:  Patient identified his mood/feeling as 8 out of 10 today.  Pt admitted weather can affect his mood sometimes.   Pt states he felt good when he went to [place] while it was cold in [place].  Pt as very engaged in group today and read all the meditation books.

## 2022-05-13 ENCOUNTER — DOCUMENTATION ONLY (OUTPATIENT)
Dept: ADDICTION MEDICINE | Facility: HOSPITAL | Age: 37
End: 2022-05-13
Payer: COMMERCIAL

## 2022-05-13 NOTE — PROGRESS NOTES
[facility] Weekly Treatment Plan Review      ATTENDANCE for the following date span:  2022 to 2022    Date Monday 5/9/22 Tuesday 5/10/22 Wednesday5/11/22 Thursday  5/12/22 Friday   2022   Group Therapy NA NA N/A 3 hours  N/A   Individual Therapy Not buzz Not buzz N/A Not buzz  N/A   Family Therapy Not buzz Not buzz N/A Not buzz N/A   Other (Specify) Not buzz Not buzz N/A Not buzz N/A     Patient attended  group this week without any absences.  No absence.      Weekly Treatment Plan Review     Treatment Plan itiated on: 2022    Dimension1: Acute Intoxication/Withdrawal Potential -   Previous Dimension Ratin  Current Dimension Ratin  Date of Last Use 1/3/2022  Any reports of withdrawal symptoms - No    Narrative: Date of last use of alcohol-1/3/2022 in the evening.  At intake, patient reported after few days of sobriety, he was experiencing jitterly hands, some anxiety, sweat. However, patient did not report need for medical detox and states he was able to tolerate the symptoms. Since the intake, patient reports he is remaining sober and has not reported any issues with withdrawals.      Dimension 2: Biomedical Conditions & Complications -   Previous Dimension Ratin  Current Dimension Ratin  Medical Concerns:  None   Current Medications & Medication Changes:  Current Outpatient Medications   Medication     buPROPion (WELLBUTRIN) 75 MG tablet     Digital Therapy (RESET FOR IOS OR ANDROID JACQUELIN) MISC     fluticasone (FLONASE) 50 MCG/ACT nasal spray     No current facility-administered medications for this visit.     Medication Prescriber:  Joel Daniel Irwin Wegener MD, [facility]  Taking meds as prescribed? Yes  Medication side effects or concerns: No  Outside medical appointments this week (list provider and reason for visit):  None     Narratives: Pt reports his medical and dental health are well at this time.  Pt has [employer] and able to access health care.  Pt has  established primary care with Dr. Wally David at [facility]. Patient continues to report good physical health;  involved with regular physical exercises, and eating healthy. Pt states he started jogging since starting outpatient treatment, and feels his physical health is in good shape.    Dimension 3: Emotional/Behavioral Conditions & Complications -   Previous Dimension Ratin  Current Dimension Ratin  PHQ9     PHQ-9 SCORE 2022   PHQ-9 Total Score 23     GAD7     FAISAL-7 SCORE 2021   Total Score 13 (moderate anxiety) -   Total Score 13 21     Mental health diagnosis None   Date of last SIB:  never   Date of  last SI:  never  Date of last HI: never  Behavioral Targets:  N/A  Current MH Assignments:  N/A    Narrative:  At the time of intake, patient did not report past or current mental health diagnoses, however pt reports he is on anti-depression medication prescribed by his primary doctor.  Pt informed he was unable to attend Tuesday group this week due to work related class. Pt said he was taking a class to get a  license in [place].  Pt instead attended Thursday group.  Pt seems making good progress in treatment.  He shares deeper understanding on his issues.  Pt admitted he realized he was co-dependent on people around him, especially ex-girlfriends.  Pt has been educated on Radical Acceptance and how to manage negative feelings.        Dimension 4: Treatment Acceptance / Resistance -   Previous Dimension Ratin  Current Dimension Ratin  ELIJAH Diagnosis:  Alcohol Use Disorder   303.90 (F10.20) Severe .  Stage - 1  Commitment to tx process/Stage of change- Action  ELIJAH assignments - Maintain internal motivation for recovery by attending groups and listening to fellow group members' addiction stories     Narrative - Patient seems motivated for treatment.  Pt reports there is no pressure/mandate from court/probation/legal issues and he  "decided to seek help on his term.  Patient continues to manage his life, work and treatment.  Pt states he is feeling stronger with his recovery and treatment has been helpful.        Dimension 5: Relapse / Continued Problem Potential -   Previous Dimension Ratin  Current Dimension Rating:  3  Relapses this week - None  Urges to use - None  UA results - No results found for this or any previous visit (from the past 168 hour(s)).    Narrative- Last use of alcohol on 1/3/2022.  This was the date of original intake but patient admits he relapsed on that day. Pt reports the longest sobriety in his lifetime as 5-6 months on his own.  He appeared to have had  little insight into his triggers at the time of intake, as evidenced by his statement. \"I can't put my fingers around it.\"  However, patient is beginning to express increased insight, as evidenced by his verbalizations of triggers and coping skills. Pt states he is remaining sober by staying in contact with his parents and getting good support form his girlfriend. He also reports connecting with his childhood friends who are doing well in their careers, and that helps him to remain focus on his life goals.     Dimension 6: Recovery Environment -  Previous Dimension Ratin  Current Dimension Ratin  Family Involvement -   Summarize attendance at family groups and family sessions - N/A  Family supportive of treatment?  Yes    Community support group attendance - No  Recreational activities - exercise (30 min jogging a day), busy at work    Narrative -  Patient reports his current incomes comes from employment and investments. 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   Patient states he has college degree in [email or web address] communication and has been able to fulfill his occupational obligations.  He is currently living independently with his girlfriend and renting an apartment in [place]. Patient reports he kept his drinking to himself and none of his friends knew " about the depth of his addiction or the fact that he was getting treatment.  Pt states his parents and girlfriends are supportive of his recovery. Patent does not report any past or current legal issues. This is patient's very first ELIJAH treatment in lifetime. Pt is not engaged in any community sober support group; however emphasizes staying busy with work, and reports a goal to jog for at least 25 minutes everyday. Patient reports desire to become a professional golfer because he likes the lifestyle of traveling and seeing different places all over the world and. Patient reports dream vacation is to [place] around [place]    Justification for Continued Treatment at this Level of Care: Patient reports desire to learn coping skills. Last alcohol use on 1/3/2022    Discharge Planning:  Target Discharge Date/Timeframe:  7/7/2022   Med Mgmt Provider/Appt:  MARYCRUZ   Ind therapy Provider/Appt:  N/A   Family therapy Provider/Appt:  N/A   Other referrals:  N/A      Has vulnerable adult status change? No    Service Coordination:  TBD    Supervision:  Every Wednesdays     Are Treatment Plan goals/objectives effective? Yes  *If no, list changes to treatment plan:    Are the current goals meeting client's needs? Yes  *If no, list the changes to treatment plan.    Client Input / Response: Patient admits his drinking became problem and expresses his desire to learn recovery skills.  Pt denies any cravings at this time.       *Client agrees with any changes to the treatment plan: N/A  *Client received copy of changes: N/A  *Client is aware of right to access a treatment plan review: Yes

## 2022-05-17 ENCOUNTER — VIRTUAL VISIT (OUTPATIENT)
Dept: ADDICTION MEDICINE | Facility: CLINIC | Age: 37
End: 2022-05-17
Attending: FAMILY MEDICINE
Payer: COMMERCIAL

## 2022-05-17 DIAGNOSIS — F10.20 ALCOHOL USE DISORDER, SEVERE, DEPENDENCE (H): ICD-10-CM

## 2022-05-17 PROCEDURE — H2035 A/D TX PROGRAM, PER HOUR: HCPCS | Mod: GT,95

## 2022-05-17 NOTE — NURSING NOTE
Individual Session     Video Visit:      Provider verified identity through the following two step process.  Patient provided:  Patient is known previously to provider    Telemedicine Visit: The patient's condition can be safely assessed and treated via synchronous audio and visual telemedicine encounter.      Reason for Telemedicine Visit: Patient has requested telehealth visit    Originating Site (Patient Location): Patient's home    Distant Site (Provider Location): Provider Remote Setting- Home Office    Consent:  The patient/guardian has verbally consented to: the potential risks and benefits of telemedicine (video visit) versus in person care; bill my insurance or make self-payment for services provided; and responsibility for payment of non-covered services.     Patient would like the video invitation sent by:  Send to e-mail at: [email or web address]    Mode of Communication:  Video Conference via Medical Zoom    As the provider I attest to compliance with applicable laws and regulations related to telemedicine.    D)Patient attended 1 hour of session from 12:30 to 1:30 pm  A)Pt states his recovery is going well and so as his personal and professional life.   R)We read meditation books and reflected of them.  We reviewed his treatment progress based on 8 Dimensions of Wellness.  Pt states all dimensions improved since getting sober. We especially talked about Social Dimension and technology no how it may impact emotional health (isolation, depression, anxiety).  Pt states he learned not to fall into social medial likes or posting.  Pt states he prefers seeing his friends in person and talked about how his college friends are doing in different parts of the [facility] and how he is keeping in contact.  Pt also appreciated support from his family.   It seems he is making good progress  Jacky Nash MA [facility] 5/17/2022  2 pm

## 2022-05-20 ENCOUNTER — DOCUMENTATION ONLY (OUTPATIENT)
Dept: ADDICTION MEDICINE | Facility: HOSPITAL | Age: 37
End: 2022-05-20
Payer: COMMERCIAL

## 2022-05-20 NOTE — PROGRESS NOTES
[facility] Weekly Treatment Plan Review      ATTENDANCE for the following date span:  2022 to 2022    Date 22   Group Therapy NA 1 hour N/A N/A N/A   Individual Therapy Not buzz Not bzuz N/A Not buzz  N/A   Family Therapy Not buzz Not buzz N/A Not buzz N/A   Other (Specify) Not buzz Not buzz N/A Not buzz N/A     Patient attended  group this week without any absences.  No absence.      Weekly Treatment Plan Review     Treatment Plan itiated on: 2022    Dimension1: Acute Intoxication/Withdrawal Potential -   Previous Dimension Ratin  Current Dimension Ratin  Date of Last Use 1/3/2022  Any reports of withdrawal symptoms - No    Narrative: Date of last use of alcohol-1/3/2022 in the evening.  At intake, patient reported after few days of sobriety, he was experiencing jitterly hands, some anxiety, sweat. However, patient did not report need for medical detox and states he was able to tolerate the symptoms. Since the intake, patient reports he is remaining sober and has not reported any issues with withdrawals.      Dimension 2: Biomedical Conditions & Complications -   Previous Dimension Ratin  Current Dimension Ratin  Medical Concerns:  None   Current Medications & Medication Changes:  Current Outpatient Medications   Medication     buPROPion (WELLBUTRIN) 75 MG tablet     Digital Therapy (RESET FOR IOS OR ANDROID JACQUELIN) MISC     fluticasone (FLONASE) 50 MCG/ACT nasal spray     No current facility-administered medications for this visit.     Medication Prescriber:  Joel Daniel Irwin Wegener MD, [facility]  Taking meds as prescribed? Yes  Medication side effects or concerns: No  Outside medical appointments this week (list provider and reason for visit):  None     Narratives: Pt reports his medical and dental health are well at this time.  Pt has [employer] and able to access health care.  Pt has  established primary care with Dr. Wally David at [facility]. Patient continues to report good physical health;  involved with regular physical exercises, and eating healthy. Pt states he started jogging since starting outpatient treatment, and feels his physical health is in good shape.    Dimension 3: Emotional/Behavioral Conditions & Complications -   Previous Dimension Ratin  Current Dimension Ratin  PHQ9     PHQ-9 SCORE 2022   PHQ-9 Total Score 23     GAD7     FAISAL-7 SCORE 2021   Total Score 13 (moderate anxiety) -   Total Score 13 21     Mental health diagnosis None   Date of last SIB:  never   Date of  last SI:  never  Date of last HI: never  Behavioral Targets:  N/A  Current MH Assignments:  N/A    Narrative:  At the time of intake, patient did not report past or current mental health diagnoses, however pt reports he is on anti-depression medication prescribed by his primary doctor.  Pt continues to be open to learning new skills.  Pt states he has gained more knowledge about addiction and how it impacted his life.  Pt participates well in group by giving feedback to peers.      Dimension 4: Treatment Acceptance / Resistance -   Previous Dimension Ratin  Current Dimension Ratin  ELIJAH Diagnosis:  Alcohol Use Disorder   303.90 (F10.20) Severe .  Stage - 1  Commitment to tx process/Stage of change- Action  ELIJAH assignments - Maintain internal motivation for recovery by attending groups and listening to fellow group members' addiction stories     Narrative - Patient seems motivated for treatment.  Pt reports there is no pressure/mandate from court/probation/legal issues and he decided to seek help on his term.  Patient continues to manage his life, work and treatment.  Pt states he is feeling stronger with his recovery and treatment has been helpful.        Dimension 5: Relapse / Continued Problem Potential -   Previous Dimension Ratin  Current  "Dimension Rating:  3  Relapses this week - None  Urges to use - None  UA results - No results found for this or any previous visit (from the past 168 hour(s)).    Narrative- Last use of alcohol on 1/3/2022.  This was the date of original intake but patient admits he relapsed on that day. Pt reports the longest sobriety in his lifetime as 5-6 months on his own.  He appeared to have had  little insight into his triggers at the time of intake, as evidenced by his statement. \"I can't put my fingers around it.\"  However, patient is beginning to express increased insight, as evidenced by his verbalizations of triggers and coping skills. Pt states he is remaining sober by staying in contact with his parents and getting good support form his girlfriend. He also reports connecting with his childhood friends who are doing well in their careers, and that helps him to remain focus on his life goals.     Dimension 6: Recovery Environment -  Previous Dimension Ratin  Current Dimension Ratin  Family Involvement -   Summarize attendance at family groups and family sessions - N/A  Family supportive of treatment?  Yes    Community support group attendance - No  Recreational activities - exercise (30 min jogging a day), busy at work    Narrative -  Patient reports his current incomes comes from employment and investments. 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   Patient states he has college degree in [employer] and has been able to fulfill his occupational obligations.  He is currently living independently with his girlfriend and renting an apartment in [place]. Patient reports he kept his drinking to himself and none of his friends knew about the depth of his addiction or the fact that he was getting treatment.  Pt states his parents and girlfriends are supportive of his recovery. Patent does not report any past or current legal issues. This is patient's very first ELIJAH treatment in lifetime. Pt is not engaged in " any community sober support group; however emphasizes staying busy with work, and reports a goal to jog for at least 25 minutes everyday. Patient reports desire to become a professional golfer because he likes the lifestyle of traveling and seeing different places all over the world and. Patient reports dream vacation is to [place] around [place]    Justification for Continued Treatment at this Level of Care: Patient reports desire to learn coping skills. Last alcohol use on 1/3/2022    Discharge Planning:  Target Discharge Date/Timeframe:  7/7/2022   Med Mgmt Provider/Appt:  MARYCRUZ   Ind therapy Provider/Appt:  N/A   Family therapy Provider/Appt:  N/A   Other referrals:  N/A      Has vulnerable adult status change? No    Service Coordination:  TBD    Supervision:  Every Wednesdays     Are Treatment Plan goals/objectives effective? Yes  *If no, list changes to treatment plan:    Are the current goals meeting client's needs? Yes  *If no, list the changes to treatment plan.    Client Input / Response: Patient admits his drinking became problem and expresses his desire to learn recovery skills.  Pt denies any cravings at this time.       *Client agrees with any changes to the treatment plan: N/A  *Client received copy of changes: N/A  *Client is aware of right to access a treatment plan review: Yes

## 2022-05-24 ENCOUNTER — VIRTUAL VISIT (OUTPATIENT)
Dept: ADDICTION MEDICINE | Facility: CLINIC | Age: 37
End: 2022-05-24
Attending: FAMILY MEDICINE
Payer: COMMERCIAL

## 2022-05-24 DIAGNOSIS — F10.20 ALCOHOL USE DISORDER, SEVERE, DEPENDENCE (H): ICD-10-CM

## 2022-05-24 PROCEDURE — H2035 A/D TX PROGRAM, PER HOUR: HCPCS | Mod: GT,95

## 2022-05-24 NOTE — NURSING NOTE
Individual Session    Video Visit:      Provider verified identity through the following two step process.  Patient provided:  Patient is known previously to provider    Telemedicine Visit: The patient's condition can be safely assessed and treated via synchronous audio and visual telemedicine encounter.      Reason for Telemedicine Visit: Patient has requested telehealth visit    Originating Site (Patient Location): Patient's home    Distant Site (Provider Location): Provider Remote Setting- Home Office    Consent:  The patient/guardian has verbally consented to: the potential risks and benefits of telemedicine (video visit) versus in person care; bill my insurance or make self-payment for services provided; and responsibility for payment of non-covered services.     Patient would like the video invitation sent by:  Send to e-mail at: [email or web address]    Mode of Communication:  Video Conference via Medical Zoom    As the provider I attest to compliance with applicable laws and regulations related to telemedicine.      D)Patient arrived at 12:30 pm and stayed till 1:40 pm.  Pt had 1 hour and 10 min of individual session today.  A) Patient seems to be continuing with good progress with his recovery.  Pt did not report any concern for emotional or physical health.  Pt said he recently joined fitness gym called [employer] and started their week long training session.  Pt states he runs on his own and needed to balance out his exercise by doing some training at the gym.  Pt state [place] is [place] to [place] the property.  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  Pt states his brother is coming to visit him for the Memorial Day.  Pt also states he and his girlfriend planned out their [place] trip for [place].  It seems  patient is full of life and future plan.  Pt appears to be sober and making good progress.      Jacky Nash MA [facility] 5/24/2022  2 pm

## 2022-05-27 ENCOUNTER — DOCUMENTATION ONLY (OUTPATIENT)
Dept: ADDICTION MEDICINE | Facility: HOSPITAL | Age: 37
End: 2022-05-27
Payer: COMMERCIAL

## 2022-05-27 NOTE — PROGRESS NOTES
[facility] Weekly Treatment Plan Review      ATTENDANCE for the following date span:  2022 to 2022    Date 22   Group Therapy NA 1 hour N/A N/A N/A   Individual Therapy Not buzz Not buzz N/A Not buzz  N/A   Family Therapy Not buzz Not buzz N/A Not buzz N/A   Other (Specify) Not buzz Not buzz N/A Not buzz N/A     Patient attended  group this week without any absences.  No absence.      Weekly Treatment Plan Review     Treatment Plan itiated on: 2022    Dimension1: Acute Intoxication/Withdrawal Potential -   Previous Dimension Ratin  Current Dimension Ratin  Date of Last Use 1/3/2022  Any reports of withdrawal symptoms - No    Narrative: Date of last use of alcohol-1/3/2022 in the evening.  At intake, patient reported after few days of sobriety, he was experiencing jitterly hands, some anxiety, sweat. However, patient did not report need for medical detox and states he was able to tolerate the symptoms. Since the intake, patient reports he is remaining sober and has not reported any issues with withdrawals.      Dimension 2: Biomedical Conditions & Complications -   Previous Dimension Ratin  Current Dimension Ratin  Medical Concerns:  None   Current Medications & Medication Changes:  Current Outpatient Medications   Medication     buPROPion (WELLBUTRIN) 75 MG tablet     Digital Therapy (RESET FOR IOS OR ANDROID JACQUELIN) MISC     fluticasone (FLONASE) 50 MCG/ACT nasal spray     No current facility-administered medications for this visit.     Medication Prescriber:  Joel Daniel Irwin Wegener MD, [facility]  Taking meds as prescribed? Yes  Medication side effects or concerns: No  Outside medical appointments this week (list provider and reason for visit):  None     Narratives: Pt reports his medical and dental health are well at this time.  Pt has [employer] and able to access health care.  Pt has  established primary care with Dr. Wally David at [facility]. Patient continues to report good physical health;  involved with regular physical exercises, and eating healthy. Pt states he started jogging since starting outpatient treatment, and feels his physical health is in good shape.  Pt reports besides the jogging, he also joined [employer] and taking classes as well to balance his workout.      Dimension 3: Emotional/Behavioral Conditions & Complications -   Previous Dimension Ratin  Current Dimension Ratin  PHQ9     PHQ-9 SCORE 2022   PHQ-9 Total Score 23     GAD7     FAISAL-7 SCORE 2021   Total Score 13 (moderate anxiety) -   Total Score 13 21     Mental health diagnosis None   Date of last SIB:  never   Date of  last SI:  never  Date of last HI: never  Behavioral Targets:  N/A  Current MH Assignments:  N/A    Narrative:  At the time of intake, patient did not report past or current mental health diagnoses, however pt reports he is on anti-depression medication prescribed by his primary doctor.  Pt continues to be open to learning new skills.  Pt has 76 hours of treatment as of  and he is nearing his discharge.      Dimension 4: Treatment Acceptance / Resistance -   Previous Dimension Ratin  Current Dimension Ratin  ELIJAH Diagnosis:  Alcohol Use Disorder   303.90 (F10.20) Severe .  Stage - 1  Commitment to tx process/Stage of change- Action  ELIJAH assignments - Maintain internal motivation for recovery by attending groups and listening to fellow group members' addiction stories     Narrative - Patient seems motivated for treatment.  Pt reports there is no pressure/mandate from court/probation/legal issues and he decided to seek help on his term.  Patient continues to manage his life, work and treatment.  Pt states he is feeling stronger with his recovery and treatment has been helpful.        Dimension 5: Relapse / Continued Problem Potential -  "  Previous Dimension Ratin  Current Dimension Rating:  3  Relapses this week - None  Urges to use - None  UA results - No results found for this or any previous visit (from the past 168 hour(s)).    Narrative- Last use of alcohol on 1/3/2022.  This was the date of original intake but patient admits he relapsed on that day. Pt reports the longest sobriety in his lifetime as 5-6 months on his own.  He appeared to have had  little insight into his triggers at the time of intake, as evidenced by his statement. \"I can't put my fingers around it.\"  However, patient is beginning to express increased insight, as evidenced by his verbalizations of triggers and coping skills. Pt states he is remaining sober by staying in contact with his parents and getting good support form his girlfriend. He also reports connecting with his childhood friends who are doing well in their careers, and that helps him to remain focus on his life goals.     Dimension 6: Recovery Environment -  Previous Dimension Ratin  Current Dimension Ratin  Family Involvement -   Summarize attendance at family groups and family sessions - N/A  Family supportive of treatment?  Yes    Community support group attendance - No  Recreational activities - exercise (30 min jogging a day), busy at work    Narrative -  Patient reports his current incomes comes from employment and investments. 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   Patient states he has college degree in [employer] communication and has been able to fulfill his occupational obligations.  He is currently living independently with his girlfriend and renting an apartment in [place]. Patient reports he kept his drinking to himself and none of his friends knew about the depth of his addiction or the fact that he was getting treatment.  Pt states his parents and girlfriends are supportive of his recovery. Patent does not report any past or current legal issues. This is patient's very first ELIJAH " treatment in lifetime. Pt is not engaged in any community sober support group; however emphasizes staying busy with work, and reports a goal to jog for at least 25 minutes everyday. Patient reports desire to become a professional golfer because he likes the lifestyle of traveling and seeing different places all over the world and. Patient reports dream vacation [place] around [place]    Justification for Continued Treatment at this Level of Care: Patient reports desire to learn coping skills. Last alcohol use on 1/3/2022    Discharge Planning:  Target Discharge Date/Timeframe:  7/7/2022   Med Mgmt Provider/Appt:  MARYCRUZ   Ind therapy Provider/Appt:  N/A   Family therapy Provider/Appt:  N/A   Other referrals:  N/A      Has vulnerable adult status change? No    Service Coordination:  TBD    Supervision:  Every Wednesdays     Are Treatment Plan goals/objectives effective? Yes  *If no, list changes to treatment plan:    Are the current goals meeting client's needs? Yes  *If no, list the changes to treatment plan.    Client Input / Response: Patient admits his drinking became problem and expresses his desire to learn recovery skills.  Pt denies any cravings at this time.       *Client agrees with any changes to the treatment plan: N/A  *Client received copy of changes: N/A  *Client is aware of right to access a treatment plan review: Yes

## 2022-05-31 ENCOUNTER — DOCUMENTATION ONLY (OUTPATIENT)
Dept: ADDICTION MEDICINE | Facility: CLINIC | Age: 37
End: 2022-05-31
Payer: COMMERCIAL

## 2022-05-31 NOTE — PROGRESS NOTES
ABSENT NOTE:    Albert Kumar was absent from group 5/31/2022 . This absence was excused. This writer attempted to contact patient via e-mail. Patient emailed staff at last minute to report that he would not be able to attend today's scheduled group session. No reason was given for his absence. Patient is expected to be in group on 06/02/2022.     Nirav Castro, [facility]  5/31/2022 , 2:36 PM

## 2022-06-01 ENCOUNTER — DOCUMENTATION ONLY (OUTPATIENT)
Dept: ADDICTION MEDICINE | Facility: HOSPITAL | Age: 37
End: 2022-06-01
Payer: COMMERCIAL

## 2022-06-01 NOTE — PROGRESS NOTES
Addiction Outpatient Weekly Clinical Staffing     Albert Kumar was staffed on 6/1/2022 . Albert Kumar was staffed on recovery strengths, barriers and treatment progress.     Staff present: RONALDO Ravi, RONALDO Flores, Fiona Choudhury [facility], [facility], Nirav Castro [facility] and Donald Welander, [facility]    Date: 6/1/2022 Time: 3:31 PM    Staff Signature: RONALDO Ravi

## 2022-06-03 ENCOUNTER — DOCUMENTATION ONLY (OUTPATIENT)
Dept: ADDICTION MEDICINE | Facility: CLINIC | Age: 37
End: 2022-06-03
Payer: COMMERCIAL

## 2022-06-03 NOTE — PROGRESS NOTES
[facility] Weekly Treatment Plan Review      ATTENDANCE for the following date span:  2022 to 2022    Date 22   Group Therapy NA 1 hour N/A N/A N/A   Individual Therapy Not buzz Not buzz N/A Not buzz  N/A   Family Therapy Not buzz Not buzz N/A Not buzz N/A   Other (Specify) Not buzz Not buzz N/A Not buzz N/A     Patient attended 0 treatment session this week. Patient was excused on Tuesday, 2022, and was not scheduled for Thursday, 2022.    Weekly Treatment Plan Review     Treatment Plan itiated on: 2022    Dimension1: Acute Intoxication/Withdrawal Potential -   Previous Dimension Ratin  Current Dimension Ratin  Date of Last Use 1/3/2022  Any reports of withdrawal symptoms - No    Narrative: Date of last use of alcohol-1/3/2022 in the evening.  At intake, patient reported after few days of sobriety, he was experiencing jitterly hands, some anxiety, sweat. However, patient did not report need for medical detox and states he was able to tolerate the symptoms. Since the intake, patient reports he is remaining sober and has not reported any issues with withdrawals.    6/3/2022: Patient did not attend any treatment session during the week of  to 6/3/2022    Dimension 2: Biomedical Conditions & Complications -   Previous Dimension Ratin  Current Dimension Ratin  Medical Concerns:  None   Current Medications & Medication Changes:  Current Outpatient Medications   Medication     buPROPion (WELLBUTRIN) 75 MG tablet     Digital Therapy (RESET FOR IOS OR ANDROID JACQUELIN) MISC     fluticasone (FLONASE) 50 MCG/ACT nasal spray     No current facility-administered medications for this visit.     Medication Prescriber:  Joel Daniel Irwin Wegener MD, [facility]  Taking meds as prescribed? Yes  Medication side effects or concerns: No  Outside medical appointments this week (list provider and reason for  visit):  None     Narratives: Pt reports his medical and dental health are well at this time.  Pt has [facility] and able to access health care.  Pt has established primary care with Dr. Wally David at [facility]. Patient continues to report good physical health;  involved with regular physical exercises, and eating healthy. Pt states he started jogging since starting outpatient treatment, and feels his physical health is in good shape.  Pt reports besides the jogging, he also joined [employer] and taking classes as well to balance his workout.    6/3/2022: Patient did not attend any treatment session during the week of  to 6/3/2022    Dimension 3: Emotional/Behavioral Conditions & Complications -   Previous Dimension Ratin  Current Dimension Ratin  PHQ9     PHQ-9 SCORE 2022   PHQ-9 Total Score 23     GAD7     FAISAL-7 SCORE 2021   Total Score 13 (moderate anxiety) -   Total Score 13 21     Mental health diagnosis None   Date of last SIB:  never   Date of  last SI:  never  Date of last HI: never  Behavioral Targets:  N/A  Current MH Assignments:  N/A    Narrative:  At the time of intake, patient did not report past or current mental health diagnoses, however pt reports he is on anti-depression medication prescribed by his primary doctor.  Pt continues to be open to learning new skills.  Pt has 76 hours of treatment as of  and he is nearing his discharge.   6/3/2022: Patient did not attend any treatment session during the week of  to 6/3/2022     Dimension 4: Treatment Acceptance / Resistance -   Previous Dimension Ratin  Current Dimension Ratin  ELIJAH Diagnosis:  Alcohol Use Disorder   303.90 (F10.20) Severe .  Stage - 1  Commitment to tx process/Stage of change- Action  ELIJAH assignments - Maintain internal motivation for recovery by attending groups and listening to fellow group members' addiction stories     Narrative - Patient seems motivated for  "treatment.  Pt reports there is no pressure/mandate from court/probation/legal issues and he decided to seek help on his term.  Patient continues to manage his life, work and treatment.  Pt states he is feeling stronger with his recovery and treatment has been helpful.    6/3/2022: Patient did not attend any treatment session during the week of  to 6/3/2022      Dimension 5: Relapse / Continued Problem Potential -   Previous Dimension Ratin  Current Dimension Rating:  3  Relapses this week - None  Urges to use - None  UA results - No results found for this or any previous visit (from the past 168 hour(s)).    Narrative- Last use of alcohol on 1/3/2022.  This was the date of original intake but patient admits he relapsed on that day. Pt reports the longest sobriety in his lifetime as 5-6 months on his own.  He appeared to have had  little insight into his triggers at the time of intake, as evidenced by his statement. \"I can't put my fingers around it.\"  However, patient is beginning to express increased insight, as evidenced by his verbalizations of triggers and coping skills. Pt states he is remaining sober by staying in contact with his parents and getting good support form his girlfriend. He also reports connecting with his childhood friends who are doing well in their careers, and that helps him to remain focus on his life goals.   6/3/2022: Patient did not attend any treatment session during the week of  to 6/3/2022    Dimension 6: Recovery Environment -  Previous Dimension Ratin  Current Dimension Ratin  Family Involvement -   Summarize attendance at family groups and family sessions - N/A  Family supportive of treatment?  Yes    Community support group attendance - No  Recreational activities - exercise (30 min jogging a day), busy at work    Narrative -  Patient reports his current incomes comes from employment and investments. 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   Patient states he " has college degree in [employer] communication and has been able to fulfill his occupational obligations.  He is currently living independently with his girlfriend and renting an apartment in [place]. Patient reports he kept his drinking to himself and none of his friends knew about the depth of his addiction or the fact that he was getting treatment.  Pt states his parents and girlfriends are supportive of his recovery. [employer] does not report any past or current legal issues. This is patient's very first ELIJAH treatment in lifetime. Pt is not engaged in any community sober support group; however emphasizes staying busy with work, and reports a goal to jog for at least 25 minutes everyday. Patient reports desire to become a professional golfer because he likes the lifestyle of traveling and seeing different places all over the world and. Patient reports dream vacation is to [place] around [place]  6/3/2022: Patient did not attend any treatment session during the week of 5/28 to 6/3/2022    Justification for Continued Treatment at this Level of Care: Patient reports desire to learn coping skills. Last alcohol use on 1/3/2022    Discharge Planning:  Target Discharge Date/Timeframe:  7/7/2022   Med Mgmt Provider/Appt:  TBD   Ind therapy Provider/Appt:  N/A   Family therapy Provider/Appt:  N/A   Other referrals:  N/A      Has vulnerable adult status change? No    Service Coordination:  TBD    Supervision:  Every Wednesdays     Are Treatment Plan goals/objectives effective? Yes  *If no, list changes to treatment plan:    Are the current goals meeting client's needs? Yes  *If no, list the changes to treatment plan.    Client Input / Response: Patient admits his drinking became problem and expresses his desire to learn recovery skills.  Pt denies any cravings at this time.       *Client agrees with any changes to the treatment plan: N/A  *Client received copy of changes: N/A  *Client is aware  of right to access a treatment plan review: Yes

## 2022-06-07 ENCOUNTER — VIRTUAL VISIT (OUTPATIENT)
Dept: ADDICTION MEDICINE | Facility: CLINIC | Age: 37
End: 2022-06-07
Attending: FAMILY MEDICINE
Payer: COMMERCIAL

## 2022-06-07 DIAGNOSIS — F10.20 ALCOHOL USE DISORDER, SEVERE, DEPENDENCE (H): ICD-10-CM

## 2022-06-07 PROCEDURE — H2035 A/D TX PROGRAM, PER HOUR: HCPCS | Mod: GT,95

## 2022-06-07 NOTE — NURSING NOTE
Individual Session    Video Visit:      Provider verified identity through the following two step process.  Patient provided:  Patient is known previously to provider    Telemedicine Visit: The patient's condition can be safely assessed and treated via synchronous audio and visual telemedicine encounter.      Reason for Telemedicine Visit: Patient has requested telehealth visit    Originating Site (Patient Location): Patient's home    Distant Site (Provider Location): Provider Remote Setting- Home Office    Consent:  The patient/guardian has verbally consented to: the potential risks and benefits of telemedicine (video visit) versus in person care; bill my insurance or make self-payment for services provided; and responsibility for payment of non-covered services.     Patient would like the video invitation sent by:  Send to e-mail at: [email or web address]    Mode of Communication:  Video Conference via Medical Zoom    As the provider I attest to compliance with applicable laws and regulations related to telemedicine.      D)Patient had 1 hour of individual session from 12:30 to 1:30 pm   A)Patient rated his emotional health at 8 out of 10 and physically feeling well.  Pt states he is going to [employer] 4 time a week and jogging.  Pt states his work is keeping him busy and his private life is stable as well.  Pt states his brother and his family visited him over the Memorial Day weekend and he got to see his 3 year old nephew.  Pt shared update on his [employer] project.  Pt states he is now looking for an  who will fund his project to remodel the house.  Pt states his girlfriend went to [place] with her family for 3 weeks to find family roots.  Pt states both his personal and professional life are stable and he is remaining sober.  Pt appreciated the treatment and said this extra time has been helpful in reflecting his life and remaining sober.    Jacky Nash MA [facility]  6/7/2022 2:30 pm

## 2022-06-10 ENCOUNTER — DOCUMENTATION ONLY (OUTPATIENT)
Dept: ADDICTION MEDICINE | Facility: HOSPITAL | Age: 37
End: 2022-06-10
Payer: COMMERCIAL

## 2022-06-10 NOTE — PROGRESS NOTES
[facility] Weekly Treatment Plan Review      ATTENDANCE for the following date span:  2022 to 6/10/2022    Date Monday 6/6/22 Tuesday 6/7/22 Wednesday6/8/22 Thursday  6/9/22 Friday   6/10/2022   Group Therapy NA 1 hour N/A N/A N/A   Individual Therapy Not buzz Not buzz N/A Not buzz  N/A   Family Therapy Not buzz Not buzz N/A Not buzz N/A   Other (Specify) Not buzz Not buzz N/A Not buzz N/A     Patient attended  treatment this week without any absence.      Weekly Treatment Plan Review     Treatment Plan itiated on: 2022    Dimension1: Acute Intoxication/Withdrawal Potential -   Previous Dimension Ratin  Current Dimension Ratin  Date of Last Use 1/3/2022  Any reports of withdrawal symptoms - No    Narrative: Date of last use of alcohol-1/3/2022 in the evening.  At intake, patient reported after few days of sobriety, he was experiencing jitterly hands, some anxiety, sweat. However, patient did not report need for medical detox and states he was able to tolerate the symptoms. Since the intake, patient reports he is remaining sober and has not reported any issues with withdrawals.      Dimension 2: Biomedical Conditions & Complications -   Previous Dimension Ratin  Current Dimension Ratin  Medical Concerns:  None   Current Medications & Medication Changes:  Current Outpatient Medications   Medication     buPROPion (WELLBUTRIN) 75 MG tablet     Digital Therapy (RESET FOR IOS OR ANDROID JACQUELIN) MISC     fluticasone (FLONASE) 50 MCG/ACT nasal spray     No current facility-administered medications for this visit.     Medication Prescriber:  Joel Daniel Irwin Wegener MD, [facility]  Taking meds as prescribed? Yes  Medication side effects or concerns: No  Outside medical appointments this week (list provider and reason for visit):  None     Narratives: Pt reports his medical and dental health are well at this time.  Pt has [employer] and able to access health care.  Pt has established primary  care with Dr. Wally David at [facility]. Patient continues to report good physical health;  involved with regular physical exercises, and eating healthy. Pt states he started jogging since starting outpatient treatment, and feels his physical health is in good shape.  Pt reports besides the jogging, he also joined [employer] and taking classes as well to balance his workout.  No change reported by the patient.  Pt reports his health is in good condition.      Dimension 3: Emotional/Behavioral Conditions & Complications -   Previous Dimension Ratin  Current Dimension Ratin  PHQ9     PHQ-9 SCORE 2022   PHQ-9 Total Score 23     GAD7     FAISAL-7 SCORE 2021   Total Score 13 (moderate anxiety) -   Total Score 13 21     Mental health diagnosis None   Date of last SIB:  never   Date of  last SI:  never  Date of last HI: never  Behavioral Targets:  N/A  Current MH Assignments:  N/A    Narrative:  At the time of intake, patient did not report past or current mental health diagnoses, however pt reports he is on anti-depression medication prescribed by his primary doctor.  Pt continues to be open to learning new skills.  Pt has 77 hours of treatment as of 6/10/22 and he is nearing his discharge.     Dimension 4: Treatment Acceptance / Resistance -   Previous Dimension Ratin  Current Dimension Ratin  ELIJAH Diagnosis:  Alcohol Use Disorder   303.90 (F10.20) Severe .  Stage - 1  Commitment to tx process/Stage of change- Action  ELIJAH assignments - Maintain internal motivation for recovery by attending groups and listening to fellow group members' addiction stories     Narrative - Patient seems motivated for treatment.  Pt reports there is no pressure/mandate from court/probation/legal issues and he decided to seek help on his term.  Patient continues to manage his life, work and treatment.  Pt states he is feeling stronger with his recovery and treatment has been helpful.  "     Dimension 5: Relapse / Continued Problem Potential -   Previous Dimension Ratin  Current Dimension Rating:  3  Relapses this week - None  Urges to use - None  UA results - No results found for this or any previous visit (from the past 168 hour(s)).    Narrative- Last use of alcohol on 1/3/2022.  This was the date of original intake but patient admits he relapsed on that day. Pt reports the longest sobriety in his lifetime as 5-6 months on his own.  He appeared to have had  little insight into his triggers at the time of intake, as evidenced by his statement. \"I can't put my fingers around it.\"  However, patient states he gained good understanding on his alcohol issues in treatment.  Pt reports remaining sober throughout the treatment and worked on improving relationship, maintained job, and increased his exercise.    Pt also reports he is emotionally feeling great and not reporting any issues.        Dimension 6: Recovery Environment -  Previous Dimension Ratin  Current Dimension Ratin  Family Involvement -   Summarize attendance at family groups and family sessions - N/A  Family supportive of treatment?  Yes    Community support group attendance - No  Recreational activities - exercise (30 min jogging a day), busy at work    Narrative -  Patient reports his current incomes comes from employment and investments. 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   Patient states he has college degree in mass communication and has been able to fulfill his occupational obligations.  He is currently living independently with his girlfriend and renting an apartment in [place]. Patient reports he kept his drinking to himself and none of his friends knew about the depth of his addiction or the fact that he was getting treatment.  Pt states his parents and girlfriends are supportive of his recovery. Patent does not report any past or current legal issues. This is patient's very first ELIJAH treatment in lifetime. Pt " is not engaged in any community sober support group; however emphasizes staying busy with work, and reports a goal to jog for at least 25 minutes everyday. Patient reports desire to become a professional golfer because he likes the lifestyle of traveling and seeing different places all over the world and. Patient reports dream vacation [place] to [place] around [place]  6/3/2022: Patient did not attend any treatment session during the week of 5/28 to 6/3/2022    Justification for Continued Treatment at this Level of Care: Patient reports desire to learn coping skills. Last alcohol use on 1/3/2022    Discharge Planning:  Target Discharge Date/Timeframe:  7/7/2022   Med Mgmt Provider/Appt:  MARYCRUZ   Ind therapy Provider/Appt:  N/A   Family therapy Provider/Appt:  N/A   Other referrals:  N/A      Has vulnerable adult status change? No    Service Coordination:  TBD    Supervision:  Every Wednesdays     Are Treatment Plan goals/objectives effective? Yes  *If no, list changes to treatment plan:    Are the current goals meeting client's needs? Yes  *If no, list the changes to treatment plan.    Client Input / Response: Patient admits his drinking became problem and expresses his desire to learn recovery skills.  Pt denies any cravings at this time.       *Client agrees with any changes to the treatment plan: N/A  *Client received copy of changes: N/A  *Client is aware of right to access a treatment plan review: Yes

## 2022-06-14 ENCOUNTER — DOCUMENTATION ONLY (OUTPATIENT)
Dept: ADDICTION MEDICINE | Facility: HOSPITAL | Age: 37
End: 2022-06-14
Payer: COMMERCIAL

## 2022-06-14 NOTE — PROGRESS NOTES
ABSENT NOTE:    Albert Kumar was absent from group 6/14/2022 . This absence was excused.  Patient contacted the counselor earlier today and asked to let him attend group on Thursday instead.  Patient is expected to be in group on    6/16/2022    RONALDO Ravi  6/14/2022 , 4:30 PM

## 2022-06-16 ENCOUNTER — VIRTUAL VISIT (OUTPATIENT)
Dept: ADDICTION MEDICINE | Facility: CLINIC | Age: 37
End: 2022-06-16
Attending: FAMILY MEDICINE
Payer: COMMERCIAL

## 2022-06-16 DIAGNOSIS — F10.20 ALCOHOL USE DISORDER, SEVERE, DEPENDENCE (H): ICD-10-CM

## 2022-06-16 PROCEDURE — H2035 A/D TX PROGRAM, PER HOUR: HCPCS | Mod: GT,95

## 2022-06-16 NOTE — NURSING NOTE
Individual Session     Video Visit:      Provider verified identity through the following two step process.  Patient provided:  Patient is known previously to provider    Telemedicine Visit: The patient's condition can be safely assessed and treated via synchronous audio and visual telemedicine encounter.      Reason for Telemedicine Visit: Patient has requested telehealth visit    Originating Site (Patient Location): Patient's home    Distant Site (Provider Location): Provider Remote Setting- Home Office    Consent:  The patient/guardian has verbally consented to: the potential risks and benefits of telemedicine (video visit) versus in person care; bill my insurance or make self-payment for services provided; and responsibility for payment of non-covered services.     Patient would like the video invitation sent by:  Send to e-mail at: [email or web address]    Mode of Communication:  Video Conference via Medical Zoom    As the provider I attest to compliance with applicable laws and regulations related to telemedicine.        D)Patient attended 1 hour of individual session from 12:30 -1:30 pm  A)Pt rated his current emotion/mood at 8 and physical at 10.  Pt states he is continuing regular exercise class, jogging an golfing.  Pt also states has been active with enjoying life such as going to concert last night and sailing with his friends on weekends.  Pt states the city [facility] reached out to him to be a  on the strawberry pie contest in July.  Pt states he never judged pie before but looking forward to new experience.  Pt states his girlfriend and her family are flying to [place] for 10 days.  Pt talked about his future trip plan of going to [place] in September and then [place] in October to celebrate his girlfriend's birthday.  Pt states he has been going to men's only AA meeting at [facility] in [place].  Pt states they are allowing their classroom to be used  for AA meetings.  Pt reports he is enjoying life sober and his job is also satisfying as well.      Jacky Nash MA [facility] 6/16/2022  1:45 pm

## 2022-06-17 ENCOUNTER — DOCUMENTATION ONLY (OUTPATIENT)
Dept: ADDICTION MEDICINE | Facility: HOSPITAL | Age: 37
End: 2022-06-17
Payer: COMMERCIAL

## 2022-06-17 NOTE — PROGRESS NOTES
[facility] Weekly Treatment Plan Review      ATTENDANCE for the following date span:  2022 to 2022    Date Monday 6/13/22 Tuesday 6/14/22 Wednesday6/15/22 Thursday  6/16/22 Friday   2022   Group Therapy NA NA N/A 1 hour N/A   Individual Therapy Not buzz Not buzz N/A Not buzz  N/A   Family Therapy Not buzz Not buzz N/A Not buzz N/A   Other (Specify) Not buzz Not buzz N/A Not buzz N/A     Patient attended group this week without any absence.  He is at phase 3 and attends only one group per week    Weekly Treatment Plan Review     Treatment Plan itiated on: 2022    Dimension1: Acute Intoxication/Withdrawal Potential -   Previous Dimension Ratin  Current Dimension Ratin  Date of Last Use 1/3/2022  Any reports of withdrawal symptoms - No    Narrative: Date of last use of alcohol-1/3/2022 in the evening.  At intake, patient reported after few days of sobriety, he was experiencing jitterly hands, some anxiety, sweat. However, patient did not report need for medical detox and states he was able to tolerate the symptoms. Since the intake, patient reports he is remaining sober and has not reported any issues with withdrawals.      Dimension 2: Biomedical Conditions & Complications -   Previous Dimension Ratin  Current Dimension Ratin  Medical Concerns:  None   Current Medications & Medication Changes:  Current Outpatient Medications   Medication     buPROPion (WELLBUTRIN) 75 MG tablet     Digital Therapy (RESET FOR IOS OR ANDROID JACQUELIN) MISC     fluticasone (FLONASE) 50 MCG/ACT nasal spray     No current facility-administered medications for this visit.     Medication Prescriber:  Joel Daniel Irwin Wegener MD, [facility]  Taking meds as prescribed? Yes  Medication side effects or concerns: No  Outside medical appointments this week (list provider and reason for visit):  None     Narratives: Pt reports his medical and dental health are well at this time.  Pt has [employer] and able to  access health care.  Pt has established primary care with Dr. Wally David at [facility]. Patient continues to report good physical health;  involved with regular physical exercises, and eating healthy. Pt states he started jogging since starting outpatient treatment, and feels his physical health is in good shape.  Pt reports besides the jogging, he also joined [employer] and taking classes as well to balance his workout.  No change reported by the patient.  Pt reports his health is in good condition.      Dimension 3: Emotional/Behavioral Conditions & Complications -   Previous Dimension Ratin  Current Dimension Ratin  PHQ9     PHQ-9 SCORE 2022   PHQ-9 Total Score 23     GAD7     FAISAL-7 SCORE 2021   Total Score 13 (moderate anxiety) -   Total Score 13 21     Mental health diagnosis None   Date of last SIB:  never   Date of  last SI:  never  Date of last HI: never  Behavioral Targets:  N/A  Current MH Assignments:  N/A    Narrative:  At the time of intake, patient did not report past or current mental health diagnoses, however pt reports he is on anti-depression medication prescribed by his primary doctor.  Pt continues to be open to learning new skills.   Pt shares he is enjoying life without drinking.  Pt particularly talks about his exercise, gym, jogging, golfing and traveling plans for September and October.  Pt shared he was selected to be a  for a strawberry pie contest in July in [facility].  Pt states he is honored and excited to give his critique.  Pt reports he has good relationship with his friends, girlfriend and family.  Pt talked about going out to [facility] in October to celebrate his girlfriend's birthday.  It seems he is making good progress in managing sobriety.      Dimension 4: Treatment Acceptance / Resistance -   Previous Dimension Ratin  Current Dimension Ratin  ELIJAH Diagnosis:  Alcohol Use Disorder   303.90 (F10.20)  "Severe .  Stage - 1  Commitment to tx process/Stage of change- Action  ELIJAH assignments - Maintain internal motivation for recovery by attending groups and listening to fellow group members' addiction stories     Narrative - Patient seems motivated for treatment.  Pt reports there is no pressure/mandate from court/probation/legal issues and he decided to seek help on his term.  Patient continues to manage his life, work and treatment.  Pt states he is feeling stronger with his recovery and treatment has been helpful.      Dimension 5: Relapse / Continued Problem Potential -   Previous Dimension Ratin  Current Dimension Rating:  3  Relapses this week - None  Urges to use - None  UA results - No results found for this or any previous visit (from the past 168 hour(s)).    Narrative- Last use of alcohol on 1/3/2022.  This was the date of original intake but patient admits he relapsed on that day. Pt reports the longest sobriety in his lifetime as 5-6 months on his own.  He appeared to have had  little insight into his triggers at the time of intake, as evidenced by his statement. \"I can't put my fingers around it.\"  However, patient states he gained good understanding on his alcohol issues in treatment.  Pt reports remaining sober throughout the treatment and worked on improving relationship, maintained job, and increased his exercise.    Pt also reports he is emotionally feeling great and not reporting any issues.        Dimension 6: Recovery Environment -  Previous Dimension Ratin  Current Dimension Ratin  Family Involvement -   Summarize attendance at family groups and family sessions - N/A  Family supportive of treatment?  Yes    Community support group attendance - No  Recreational activities - exercise (30 min jogging a day), busy at work    Narrative -  Patient reports his current incomes comes from employment and investments. 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   Patient states he has college " degree in mass communication and has been able to fulfill his occupational obligations.  He is currently living independently with his girlfriend and renting an apartment in [place]. Patient reports he kept his drinking to himself and none of his friends knew about the depth of his addiction or the fact that he was getting treatment.  Pt states his parents and girlfriends are supportive of his recovery. [employer] does not report any past or current legal issues. This is patient's very first ELIJAH treatment in lifetime. Pt is not engaged in any community sober support group; however emphasizes staying busy with work, and reports a goal to jog for at least 25 minutes everyday. Patient reports desire to become a professional golfer because he likes the lifestyle of traveling and seeing different places all over the world and. Patient reports dream vacation is to sail around the [place]  6/3/2022: Patient did not attend any treatment session during the week of 5/28 to 6/3/2022    Justification for Continued Treatment at this Level of Care: Patient reports desire to learn coping skills. Last alcohol use on 1/3/2022    Discharge Planning:  Target Discharge Date/Timeframe:  7/7/2022   Med Mgmt Provider/Appt:  TBD   Ind therapy Provider/Appt:  N/A   Family therapy Provider/Appt:  N/A   Other referrals:  N/A      Has vulnerable adult status change? No    Service Coordination:  TBD    Supervision:  Every Wednesdays     Are Treatment Plan goals/objectives effective? Yes  *If no, list changes to treatment plan:    Are the current goals meeting client's needs? Yes  *If no, list the changes to treatment plan.    Client Input / Response: Patient admits his drinking became problem and expresses his desire to learn recovery skills.  Pt denies any cravings at this time.       *Client agrees with any changes to the treatment plan: N/A  *Client received copy of changes: N/A  *Client is aware of right to  access a treatment plan review: Yes

## 2022-06-21 ENCOUNTER — VIRTUAL VISIT (OUTPATIENT)
Dept: ADDICTION MEDICINE | Facility: CLINIC | Age: 37
End: 2022-06-21
Attending: FAMILY MEDICINE
Payer: COMMERCIAL

## 2022-06-21 DIAGNOSIS — F10.20 ALCOHOL USE DISORDER, SEVERE, DEPENDENCE (H): Primary | ICD-10-CM

## 2022-06-21 PROCEDURE — H2035 A/D TX PROGRAM, PER HOUR: HCPCS | Mod: HQ,GT,95

## 2022-06-21 NOTE — GROUP NOTE
"PATIENT'S NAME: Albert Kumar    MRN: [id number]    : 1985    DATE OF SERVICE: 22    START TIME: 12:30 PM    END TIME: 3:15 PM    FACILITATOR(S): Nirav HIGGINS    TOPIC: BEH Group Therapy    Number of patients attending the group: 3    Group Length: 3 Hours    Group Therapy Type: Core values    Video Visit:    Provider verified identity through the following two step process.    Patient provided: Patient is known previously to provider    Telemedicine Visit: The patient's condition can be safely assessed and treated via synchronous audio and visual telemedicine encounter.    Reason for Telemedicine Visit: Services only offered telehealth    Originating Site (Patient Location): Patient's home    Distant Site (Provider Location): Provider Remote Setting- Home Office    Consent: The patient/guardian has verbally consented to: the potential risks and benefits of telemedicine (video visit) versus in person care; bill my insurance or make self-payment for services provided; and responsibility for payment of non-covered services.    Patient would like the video invitation sent by: Send to e-mail at: patient chart email    Mode of Communication: Video Conference via Medical Zoom    As the provider I attest to compliance with applicable laws and regulations related to telemedicine.    Group Attendance: Attended group session    Patient's response to the group topic/interactions: discussed personal experience with topic and listened actively    Patient appeared to be Actively participating, Attentive and Engaged.    Client specific details:     Patient presented for group from 12:30 PM, and participated fully until 3:15 PM. Patient reports his last time of alcohol use was in 2022. Patient reports being involved in a few projects and says there is some stress but he loves it because it is \"positive\" stress. Patient reports that he loves the structure that recovery has given him, and he is " practicing his coping skills well. 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 He reports that the project has been stressful but he has got lots of support  from family and some friends who are volunteering free time to help him. Patient reports he is grateful for the beautiful and shinny day today because he was able to get out for a walk prior to coming to group.

## 2022-06-24 ENCOUNTER — DOCUMENTATION ONLY (OUTPATIENT)
Dept: ADDICTION MEDICINE | Facility: CLINIC | Age: 37
End: 2022-06-24

## 2022-06-24 NOTE — PROGRESS NOTES
[facility] Weekly Treatment Plan Review      ATTENDANCE for the following date span:  2022 to     Date 22   Group Therapy NA NA N/A 1 hour N/A   Individual Therapy Not buzz Not buzz N/A Not buzz  N/A   Family Therapy Not buzz Not buzz N/A Not buzz N/A   Other (Specify) Not buzz Not buzz N/A Not buzz N/A     Patient attended 1 group session this week without any absence.  He is at phase 3 and attends only one group per week    Weekly Treatment Plan Review     Treatment Plan itiated on: 2022    Dimension1: Acute Intoxication/Withdrawal Potential -   Previous Dimension Ratin  Current Dimension Ratin  Date of Last Use 1/3/2022  Any reports of withdrawal symptoms - No    Narrative: Date of last use of alcohol-1/3/2022 in the evening.  At intake, patient reported after few days of sobriety, he was experiencing jitterly hands, some anxiety, sweat. However, patient did not report need for medical detox and states he was able to tolerate the symptoms. Since the intake, patient reports he is remaining sober and has not reported any issues with withdrawals.      Dimension 2: Biomedical Conditions & Complications -   Previous Dimension Ratin  Current Dimension Ratin  Medical Concerns:  None   Current Medications & Medication Changes:  Current Outpatient Medications   Medication     buPROPion (WELLBUTRIN) 75 MG tablet     Digital Therapy (RESET FOR IOS OR ANDROID JACQUELIN) MISC     fluticasone (FLONASE) 50 MCG/ACT nasal spray     No current facility-administered medications for this visit.     Medication Prescriber:  Joel Daniel Irwin Wegener MD, [facility]  Taking meds as prescribed? Yes  Medication side effects or concerns: No  Outside medical appointments this week (list provider and reason for visit):  None     Narratives: Pt reports his medical and dental health are well at this time.  Pt has [facility] and  able to access health care.  Pt has established primary care with Dr. Wally David at [facility]. Patient continues to report good physical health;  involved with regular physical exercises, and eating healthy. Pt states he started jogging since starting outpatient treatment, and feels his physical health is in good shape.  Pt reports besides the jogging, he also joined [employer] and taking classes as well to balance his workout. Pt reports his health is in good condition, and there are no current concerns.    Dimension 3: Emotional/Behavioral Conditions & Complications -   Previous Dimension Ratin  Current Dimension Ratin  PHQ9     PHQ-9 SCORE 2022   PHQ-9 Total Score 23     GAD7     FAISAL-7 SCORE 2021   Total Score 13 (moderate anxiety) -   Total Score 13 21     Mental health diagnosis None   Date of last SIB:  never   Date of  last SI:  never  Date of last HI: never  Behavioral Targets:  N/A  Current MH Assignments:  N/A    Narrative:  At the time of intake, patient did not report past or current mental health diagnoses, however pt reports he is on anti-depression medication prescribed by his primary doctor.  Pt continues to be opened to learning new skills.   Pt shares he is enjoying life without drinking.  Pt particularly talks about his exercise, gym, jogging, golfing and traveling plans for September and October.  Pt shared he was selected to be a  for a strawberry pie contest in July in [facility].  Pt states he is honored and excited to give his critique. 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      Dimension 4: Treatment Acceptance / Resistance -   Previous Dimension Ratin  Current Dimension Ratin  ELIJAH Diagnosis:  Alcohol Use Disorder    "303.90 (F10.20) Severe .  Stage - 1  Commitment to tx process/Stage of change- Action  LEIJAH assignments - Maintain internal motivation for recovery by attending groups and listening to fellow group members' addiction stories     Narrative - Patient seems motivated for treatment.  Pt reports there is no pressure/mandate from court/probation/legal issues and he decided to seek help on his term.  Patient continues to manage his life, work and treatment.  Pt states he is feeling stronger with his recovery and treatment has been helpful. Patient reports motivation by his positive contributions to his family and to society.    Dimension 5: Relapse / Continued Problem Potential -   Previous Dimension Ratin  Current Dimension Rating:  3  Relapses this week - None  Urges to use - None  UA results - No results found for this or any previous visit (from the past 168 hour(s)).    Narrative- Last use of alcohol on 1/3/2022.  This was the date of original intake but patient admits he relapsed on that day. Pt reports the longest sobriety in his lifetime as 5-6 months on his own.  He appeared to have had  little insight into his triggers at the time of intake, as evidenced by his statement. \"I can't put my fingers around it.\"  However, patient states he gained good understanding in treatment about his alcohol use issues  Pt reports remaining sober throughout treatment and is worked on improving relationships, maintaining his job, and increasing physical activities.      Dimension 6: Recovery Environment   Previous Dimension Ratin  Current Dimension Ratin  Family Involvement -   Summarize attendance at family groups and family sessions - N/A  Family supportive of treatment?  Yes    Community support group attendance - No  Recreational activities - exercise (30 min jogging a day), busy at work    Narrative -  Patient reports his current incomes come from employment and investments. 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 " Patient states he has college degree in [employer] and has been able to fulfill his occupational obligations.  He is currently living independently with his girlfriend and renting an apartment in [place]. Patient reports he kept his drinking to himself and none of his friends knew about the depth of his addiction or the fact that he was getting treatment.  Pt states his parents and girlfriends are supportive of his recovery. Patent does not report any past or current legal issues. This is patient's very first ELIJAH treatment in lifetime. Pt is not engaged in any community sober support group; however emphasizes staying busy with work, and reports a goal to jog for at least 25 minutes everyday. Patient reports desire to become a professional golfer because he likes the lifestyle of traveling and seeing different places all over the world. Patient reports dream vacation is [place] around [place]. Patient reports no new concerns in this area.    Justification for Continued Treatment at this Level of Care: Patient reports desire to learn coping skills. Last alcohol use on 1/3/2022    Discharge Planning:  Target Discharge Date/Timeframe:  7/7/2022   Med Mgmt Provider/Appt:  [email or web address]   Ind therapy Provider/Appt:  N/A   Family therapy Provider/Appt:  N/A   Other referrals:  N/A      Has vulnerable adult status change? No    Service Coordination:  TBD    Supervision:  Every Wednesdays     Are Treatment Plan goals/objectives effective? Yes  *If no, list changes to treatment plan:    Are the current goals meeting client's needs? Yes  *If no, list the changes to treatment plan.    Client Input / Response: Patient admits his drinking became problem and expresses his desire to learn recovery skills.  Pt denies any cravings at this time.       *Client agrees with any changes to the treatment plan: N/A  *Client received copy of changes: N/A  *Client is aware of right to access a treatment  plan review: Yes

## 2022-06-28 ENCOUNTER — DOCUMENTATION ONLY (OUTPATIENT)
Dept: ADDICTION MEDICINE | Facility: HOSPITAL | Age: 37
End: 2022-06-28

## 2022-06-29 NOTE — PROGRESS NOTES
Late Entry from yesterday 6/28/2022    D) Patient emailed before the group start time stating he has an 1 pm appointment at work and will try to attend group at 2 pm.    A) Counselor suggested to attend the Thursday group instead.  Patient was excused from group on 6/28.  He is expected to attend group on 6/30/22..    Jacky Nash [facility] 6/29/2022  2pm

## 2022-06-30 ENCOUNTER — DOCUMENTATION ONLY (OUTPATIENT)
Dept: ADDICTION MEDICINE | Facility: CLINIC | Age: 37
End: 2022-06-30

## 2022-06-30 NOTE — PROGRESS NOTES
ABSENT NOTE:    Albert Kumar was absent from group 6/30/2022 . This absence was not excused. This writer attempted to contact patient via email. Patient is expected to be in group on 07/05/2022.     Nirav Castro [facility]  6/30/2022 , 1:51 PM

## 2022-07-01 ENCOUNTER — DOCUMENTATION ONLY (OUTPATIENT)
Dept: ADDICTION MEDICINE | Facility: CLINIC | Age: 37
End: 2022-07-01

## 2022-07-01 NOTE — PROGRESS NOTES
Weekly Progress Note: 6/25 to 7/1/22  Albert Kumar  1985  [id number]      D) Pt attended ZERO groups  this week with 1 absences. A) Staff facilitated groups and reviewed tx progress. Assessed for [facility]. R) Unable to assess along six dimensions or for [facility] due to lack of attendance.   T) Patient E-mailed staff to report that he could not make it to his scheduled treatment session due to work engagement.

## 2022-07-05 ENCOUNTER — HOSPITAL ENCOUNTER (OUTPATIENT)
Dept: BEHAVIORAL HEALTH | Facility: CLINIC | Age: 37
Discharge: HOME | End: 2022-07-05
Attending: FAMILY MEDICINE
Payer: COMMERCIAL

## 2022-07-05 PROCEDURE — H2035 A/D TX PROGRAM, PER HOUR: HCPCS | Mod: GT,95

## 2022-07-05 NOTE — PROGRESS NOTES
Video Visit: Individual Session from 12:30 PM to 1:35 PM      Provider verified identity through the following two step process.  Patient provided:  Patient photo and Patient is known previously to provider    Telemedicine Visit: The patient's condition can be safely assessed and treated via synchronous audio and visual telemedicine encounter.      Reason for Telemedicine Visit: Patient has requested telehealth visit    Originating Site (Patient Location): Patient's home    Distant Site (Provider Location): [facility]: Remote    Consent:  The patient/guardian has verbally consented to: the potential risks and benefits of telemedicine (video visit) versus in person care; bill my insurance or make self-payment for services provided; and responsibility for payment of non-covered services.     Patient would like the video invitation sent by:  Send to e-mail at: [email or web address]    Mode of Communication:  Video Conference via Medical Zoom    As the provider I attest to compliance with applicable laws and regulations related to telemedicine.    Patient attended an individual session and completed a casual check-in. Patient reports he has remained sober and engaged in healthy activities, including running regularly. Patient reports he is preparing for the [place] [employer] coming up in October this year. Patient rates his physical and emotional health at 8/10; says he continues to work and attends sober meetings on Thursdays, called [facility] [facility], where the group meets to share recovery experiences. Patient reports great support from his girlfriend and his parents. Patient reports plan for a boatt ride over the coming weekend [place] the [place].

## 2022-07-08 ENCOUNTER — DOCUMENTATION ONLY (OUTPATIENT)
Dept: ADDICTION MEDICINE | Facility: HOSPITAL | Age: 37
End: 2022-07-08

## 2022-07-08 NOTE — PROGRESS NOTES
[facility] Weekly Treatment Plan Review      ATTENDANCE for the following date span:  2022 to 2022    Date 22   Group Therapy NA 1.0 hours N/A Not Dre N/A   Individual Therapy Not dre Not dre N/A Not dre  N/A   Family Therapy Not dre Not dre N/A Not dre N/A   Other (Specify) Not dre Not dre N/A Not dre N/A     Patient attended 1 group session during this week. He is at phase 3 and attends only one group per week    Weekly Treatment Plan Review     Treatment Plan itiated on: 2022    Dimension1: Acute Intoxication/Withdrawal Potential -   Previous Dimension Ratin  Current Dimension Ratin  Date of Last Use 1/3/2022  Any reports of withdrawal symptoms - No    Narrative: Date of last use of alcohol-1/3/2022 in the evening.  At intake, patient reported after few days of sobriety, he was experiencing jitterly hands, some anxiety, sweat. However, patient did not report need for medical detox and states he was able to tolerate the symptoms. Since the intake, patient reports he is remaining sober and has not reported any issues with withdrawals. Patient is now in Phase 3 of treatment and has no withdrawal concerns.    Dimension 2: Biomedical Conditions & Complications -   Previous Dimension Ratin  Current Dimension Ratin  Medical Concerns:  None   Current Medications & Medication Changes:  Current Outpatient Medications   Medication     buPROPion (WELLBUTRIN) 75 MG tablet     Digital Therapy (RESET FOR IOS OR ANDROID JACQUELIN) MISC     fluticasone (FLONASE) 50 MCG/ACT nasal spray     No current facility-administered medications for this visit.     Medication Prescriber:  Joel Daniel Irwin Wegener MD, [facility]  Taking meds as prescribed? Yes  Medication side effects or concerns: No  Outside medical appointments this week (list provider and reason for visit):  None     Narratives: Pt reports his medical and  dental health are well at this time.  Pt has [facility] and able to access health care.  Pt has established primary care with Dr. Wally David at [facility]. Patient continues to report good physical health;  involved with regular physical exercises, and eating healthy. Pt states he started jogging since starting outpatient treatment, and feels his physical health is in good shape.  Pt reports besides the jogging, he also joined [employer] and taking classes as well to balance his workout. Pt reports preparing for the [employer] coming up in October this year.    Dimension 3: Emotional/Behavioral Conditions & Complications -   Previous Dimension Ratin  Current Dimension Ratin  PHQ9     PHQ-9 SCORE 2022   PHQ-9 Total Score 23     GAD7     FAISAL-7 SCORE 2021   Total Score 13 (moderate anxiety) -   Total Score 13 21     Mental health diagnosis None   Date of last SIB:  never   Date of  last SI:  never  Date of last HI: never  Behavioral Targets:  N/A  Current MH Assignments:  N/A    Narrative:  At the time of intake, patient did not report past or current mental health diagnoses, however pt reports he is on anti-depression medication prescribed by his primary doctor.  Pt continues to be opened to learning new skills.   Pt shares he is enjoying life without drinking.  Pt particularly talks about his exercise, gym, jogging, golfing and traveling plans for September and October.  Pt shared he was selected to be a  for a strawberry pie contest in July in [facility]. 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    Dimension 4: Treatment Acceptance / Resistance -   Previous Dimension Ratin  Current Dimension Ratin  ELIJAH Diagnosis:  Alcohol Use  "Disorder   303.90 (F10.20) Severe .  Stage - 1  Commitment to tx process/Stage of change- Action  ELIJAH assignments - Maintain internal motivation for recovery by attending groups and listening to fellow group members' addiction stories     Narrative - Patient seems motivated for treatment.  Pt reports there is no pressure/mandate from court/probation/legal issues and he decided to seek help on his term.  Patient continues to manage his life, work and treatment.  Pt states he is feeling stronger with his recovery and treatment has been helpful. 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    Dimension 5: Relapse / Continued Problem Potential -   Previous Dimension Ratin  Current Dimension Rating:  3  Relapses this week - None  Urges to use - None  UA results - No results found for this or any previous visit (from the past 168 hour(s)).    Narrative- Last use of alcohol on 1/3/2022.  This was the date of original intake but patient admits he relapsed on that day. Pt reports the longest sobriety in his lifetime as 5-6 months on his own.  He appeared to have had  little insight into his triggers at the time of intake, as evidenced by his statement. \"I can't put my fingers around it.\"  However, patient states he gained good understanding in treatment about his alcohol use issues  Pt reports remaining sober throughout treatment and is working on improving relationships, maintaining his job, and increasing physical activities. Patient denies having any new concerns this week.    Dimension 6: Recovery Environment   Previous Dimension Ratin  Current Dimension Ratin  Family Involvement -   Summarize attendance at family groups and family sessions - N/A  Family supportive of treatment?  Yes    Community support group attendance - No  Recreational " activities - exercise (30 min jogging a day), busy at work    Narrative -  Patient reports his current incomes come from employment and investments. 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  He is currently living independently with his girlfriend and renting an apartment in [place]. Patient reports he kept his drinking to himself and none of his friends knew about the depth of his addiction or the fact that he was getting treatment.  Pt states his parents and girlfriends are supportive of his recovery. Patent does not report any past or current legal issues. Patient reports desire to become a professional golfer because he likes the lifestyle of traveling and seeing different places all over the world. Patient reports preparing himself for the [employer], coming up in October of this year.    Justification for Continued Treatment at this Level of Care: Patient reports desire to learn coping skills. Last alcohol use on 1/3/2022    Discharge Planning:  Target Discharge Date/Timeframe:  9/7/2022   Med Mgmt Provider/Appt:  [email or web address]   Ind therapy Provider/Appt:  N/A   Family therapy Provider/Appt:  N/A   Other referrals:  N/A      Has vulnerable adult status change? No    Service Coordination:  TBD    Supervision:  Every Wednesdays     Are Treatment Plan goals/objectives effective? Yes  *If no, list changes to treatment plan:    Are the current goals meeting client's needs? Yes  *If no, list the changes to treatment plan.    Client Input / Response: Patient admits his drinking became problem and expresses his desire to learn recovery skills.  Pt denies any cravings at this time.       *Client agrees with any changes to the treatment plan: N/A  *Client received copy of changes: N/A  *Client is aware of right to access a treatment plan review: Yes

## 2022-07-08 NOTE — PROGRESS NOTES
Adult CD Treatment Plan  Update                            Patient Name: Albert Kumar   MRN: [id number]   : 1985  Identified Gender: Male   Admit Date: 2022  Current Treatment Phase: 3  Last Updated: 2022    SUBSTANCE USE DISORDER(S): F10.21 Alcohol Use Disorder, Severe       Dimension 1: Acute Intoxication/Withdrawal Potential, Risk level: 1    Needs identified from Comprehensive Assessment Summary:  Date of last use of alcohol-1/3/2022 in the evening.  Pt denies any concerns about withdrawal symptoms at this time.    Update: Phase 3  Date of last use: 1/3/2022  Patient currently receiving medication assisted therapy (MAT): No  Current or recent withdrawal symptoms: mild   Focus area: Patient reports date of last use to be 1/3/2022   Date Assigned: 2022  Goal: Patient to maintain abstinence throughout outpatient treatment.  Goal needs to be completed prior to discharge? [x]  Treatment Strategies:   Patient to report any substance/alcohol use to counselor to determine if any changes need to be made to address withdrawal symptoms.   Target Date: 2022  Date Completed:       Dimension 2: Biomedical Conditions/Complications, Risk level: 0    Needs identified from Comprehensive Assessment Summary: Pt reports his medical and dental health are well at this time.  Pt has [facility] and able to access health care.  Pt has established primary care with Dr. Wally David at [facility].    Update: Phase 3  Focus area: Patient reports no concern for his health at this time. Treatment focus will be to maintain his current health.    Goal: Maintain physical health   Goal needs to be completed prior to discharge? []  Methods/Strategies (must include amount and frequency):   1. Albert will report any changes to physical health to counselor.   2. Albert will attend all scheduled doctor's appointments.   3. Albert will take medications as prescribed.   Target Date:  9/7/2022  Completion Date:       Dimension 3: Emotional/Behavioral/Cognitive, Risk level: 1    Needs identified from Comprehensive Assessment Summary: Patient did not report past or current mental health diagnoses.  Patient reports no current individual psychotherapy nor is he on medications. Pt reported daily heavy drinking impacting his emotional health in the past.  Patient was positive on the GAIN-SS. At the time of the comprehensive assessment there were no reports of SI/SIB/HI.   Update: Initial   Focus area: Patient does not report ever getting any mental health diagnosis but he reports heavy drinking caused increased anxiety  Goal: Gain knowledge on how alcohol use and addiction can impact mental health symptoms including anxiety  Goal needs to be completed prior to discharge? [x]  Methods/Strategies (must include amount and frequency):   1. Patient agreed to be open to referral to mental health services if/when it is appropriate.   2: Patient to gain better understanding of how his alcohol use effects his anxiety.  Patient will participate in group check in and identify his anxiety level.  Pt will identify 5 ways how alcohol impacted his anxiety.   3. Patient will complete a Self-Care plan to create balance and decrease feelings of anxiety.  Target Date: 9/7/2022  Completion Date:       Dimension 4: Readiness to Change, Risk level 0    Needs identified from Comprehensive Assessment Summary: Patient seems motivated for treatment.  Pt reports there is no pressure/mandate from court/probation/legal issues and he decided to seek help on his term.  Pt stats the last use was on 1/3/2022 and has been sober for 4 days at the time of intake.   Pt verbalizes drinking as a problem and his need to quit.   Update: Phase 3  Focus area: Patient will maintain his high level of motivation for sobriety he present at intake throughout the treatment   Goal: Maintain individual motivation for recovery   Goal needs Maintainto be  completed prior to discharge? [x]  Methods/Strategies (must include amount and frequency):   1. Patient will attend 1, 3-hour group per week. Days/Times: Tuesday from 12:30 PM to 3:30 PM.   Zoom ID:   2. Patient will contact staff if unable to attend at [email or web address] [phone] and [email or web address] [phone]  3. Patient to identify 5 motivators to maintain recovery, including both internal and external motivation.   Target Date: 9/7/2022  Completion Date:       Dimension 5: Relapse/Continued Use/Continued Problem Potential, Risk level: 3    Needs identified from Comprehensive Assessment Summary: Last use of alcohol on 1/3/2022.  This was the date of original intake but patient admits he relapsed on that day. Pt reports the longest sobriety in his lifetime as 5-6 months on his own.  He seems to have increased insight into his triggers at this time, as evidenced by his stability and participation in sober activities.  Update: Phase 3  Focus area: Patient will continue to increase insight into triggers and learn coping skills.   Goal: Maintain insight into triggers and coping skills to prevent relapse.   Goal needs to be completed prior to discharge? [x]  Methods/Strategies (must include amount and frequency):   1. Patient to learn and implement personal coping strategies to manage urges to use alcohol.    2. Patient to identify personal triggers and high-risk situations for relapse.   3. Patient to identify weekly and daily activities he does to maintain his recovery. Share with counselor and in group.   Target Date: 9/7/2022  Completion Date:       Dimension 6: Recovery Environment, Risk level: 2  Needs identified from Comprehensive Assessment Summary: Patient reports his current incomes comes from employment and investments.  Patient states he has college degree in [employer] and has been able to fulfill his occupational obligations.  He is currently living  "independently with his girlfriend and renting an apartment in [place].   He reports he kept his drinking to himself and none of his friends know about the depth of his addiction or the fact that his is getting treatment.  Pt states his parents and girlfriends are supportive of his recovery.   Patent does not report any past or current legal issues.  This is patient's very first ELIJAH treatment in lifetime.   Pt is engaged in community sober support group. Update: Phase 3  Desire for family involvement: \"yes, maybe\"   Focus area/need: develop sober supportive daily structure   Goal:   Goal needs to be completed prior to discharge? [x]  Methods/Strategies (must include amount and frequency):   1. Patient will continue to identify healthy activities that can replace drinking behavior.  Patient will continue those activities and report back to group/counselors on how it is helping his recovery.    2. Patient will continue to be open to  community support groups  3. Patient will participate in Telephone Recovery Service   Target Date: 9/7/2022  Completion Date:       Resources  Resources to which the patient is being referred for problems when problems are to be addressed concurrently by another provider: [facility]: [phone]       [x] Contact insurance to ensure referrals are in network    Vulnerable Adult Review   [X] Review of the facility Abuse Prevention plan was reviewed with the patient   [X] No individual abuse plan is necessary   [ ] In addition to the facility Abuse Prevention plan, an Individual Abuse Plan has been put in place if needed    Patient attests the date of last use as 1/3/2022. Patient attests he have participated in the creation of this treatment plan. Patient has been provided a copy of this treatment plan and is in agreement with how this plan is written and will be stored in the electronic record.       Patient Signature:  _________________  Date: 7/8/2022    Counselor " Signature: RONALDO Cardenas _____________________    Date: 7/8/2022

## 2022-07-12 ENCOUNTER — DOCUMENTATION ONLY (OUTPATIENT)
Dept: ADDICTION MEDICINE | Facility: HOSPITAL | Age: 37
End: 2022-07-12

## 2022-07-12 NOTE — PROGRESS NOTES
ABSENT NOTE:    Albert Kumar was absent from group 7/12/2022 . This absence was excused. This writer attempted to contact patient via E-mail. Patient reports technical difficulty logging into Zoom. Patient is expected to be in group on 07/14/2022.     Nirav Castro [facility]  7/12/2022 , 2:39 PM

## 2022-07-15 ENCOUNTER — DOCUMENTATION ONLY (OUTPATIENT)
Dept: ADDICTION MEDICINE | Facility: HOSPITAL | Age: 37
End: 2022-07-15

## 2022-07-15 NOTE — PROGRESS NOTES
Weekly Progress Note: 7/9 to 7/15/22  Albert Kumar  1985  [id number]      D) Pt attended ZERO groups  this week with 1 excused absence. A) Staff facilitated groups and reviewed tx progress. Assessed for [facility]. R) Unable to assess along six dimensions or for [facility] due to lack of attendance.   T) Patient will be in treatment on 7/19/2022.

## 2022-07-19 ENCOUNTER — HOSPITAL ENCOUNTER (OUTPATIENT)
Dept: BEHAVIORAL HEALTH | Facility: CLINIC | Age: 37
Discharge: HOME | End: 2022-07-19
Attending: FAMILY MEDICINE
Payer: COMMERCIAL

## 2022-07-19 DIAGNOSIS — F10.20 ALCOHOL USE DISORDER, SEVERE, DEPENDENCE (H): ICD-10-CM

## 2022-07-19 PROCEDURE — H2035 A/D TX PROGRAM, PER HOUR: HCPCS | Mod: HQ,GT,95

## 2022-07-19 NOTE — GROUP NOTE
Group Therapy Documentation    PATIENT'S NAME: Albert Kumar  MRN:   [id number]  :   1985  ACCT. NUMBER: [id number]  DATE OF SERVICE: 22  START TIME: 12:30 PM  END TIME:  2:45 PM  FACILITATOR(S): Jacky Nash LADC; Nirav Castro LADC  TOPIC: BEH Group Therapy  Number of patients attending the group:  3  Group Length:  2 hours and 15 min     Group Therapy Type: Addiction and Psychoeducation    Summary of Group / Topics Discussed:    Psychoeducation/Skills Readiness to change (stages of change) [ELIJAH]  This topic will give a general overview of stage of change models and how they apply to the personal experience of each patient.    Objective(s):    Patient will identify at least 2 stage of change models.    Patient will identify at least 5 stages within the Prochaska-DiClemente model.    Patient will identify their own position within the model(s).    Structure:    Provide psychoeducation on readiness to change and stages of change.    Facilitate group discussion around each patient s reasons to change and current place in the model(s).    Use teach-back techniques to ensure patients  understanding.    Provide patients with handouts to enhance learning.    Expected therapeutic outcomes:      Understand change as an essential and non-linear process.    Reduce confusion about ambivalence.    Enhanced motivation to change.    Ability to maintain/return to sobriety    Therapeutic outcome(s) measured by:    Patient s ability to teach-back information learned in group.    Patient s demonstration of learning by identification of their own stage of change      Group Attendance:  Attended group session for 1 hour from 12:30 pm-1:30 pm.  Pt emailed counselor at 1:40 pm stating he was having Internet connection issue.  Pt came back at 2:40 pm as the group was just wrapping up.      Patient's response to the group topic/interactions:  cooperative with task, discussed personal experience with topic,  expressed readiness to alter behaviors, expressed understanding of topic, gave appropriate feedback to peers, listened actively and offered helpful suggestions to peers    Patient appeared to be Actively participating.        Client specific details: Pt states he is managing his sobriety and more and more engaged in healthy activities.  Pt states he is part of a [employer] and meeting with [facility] business owners.  Pt also states he is proud of getting healthy routine back in his life since getting sober.  Pt states he has good routine of going to bed, exercising and having social time with friends/family.       Video Visit:      Provider verified identity through the following two step process.  Patient provided:  Patient is known previously to provider    Telemedicine Visit: The patient's condition can be safely assessed and treated via synchronous audio and visual telemedicine encounter.      Reason for Telemedicine Visit: Patient has requested telehealth visit    Originating Site (Patient Location): Patient's home    Distant Site (Provider Location): Provider Remote Setting- Home Office    Consent:  The patient/guardian has verbally consented to: the potential risks and benefits of telemedicine (video visit) versus in person care; bill my insurance or make self-payment for services provided; and responsibility for payment of non-covered services.     Patient would like the video invitation sent by:  Send to e-mail at: [email or web address]    Mode of Communication:  Video Conference via Medical Zoom    As the provider I attest to compliance with applicable laws and regulations related to telemedicine.

## 2022-07-20 ENCOUNTER — DOCUMENTATION ONLY (OUTPATIENT)
Dept: ADDICTION MEDICINE | Facility: HOSPITAL | Age: 37
End: 2022-07-20

## 2022-07-20 NOTE — PROGRESS NOTES
Addiction Outpatient Weekly Clinical Staffing     Albert Kumar was staffed on 7/20/2022 . Albert Kumar was staffed on recovery strengths, barriers and treatment progress.     Staff present: RONALDO Hi, RONALDO Ravi, Fiona Choudhury [facility], [facility], Debbie Alva [facility] and Donald Welander, [facility]    Date: 7/20/2022 Time: 3:45 PM    Staff Signature: RONALDO Ravi

## 2022-07-22 ENCOUNTER — DOCUMENTATION ONLY (OUTPATIENT)
Dept: ADDICTION MEDICINE | Facility: HOSPITAL | Age: 37
End: 2022-07-22

## 2022-07-22 NOTE — PROGRESS NOTES
[facility] Weekly Treatment Plan Review      ATTENDANCE for the following date span:  2022 to 2022    Date 22   Group Therapy NA 1.0 hours N/A Not Dre N/A   Individual Therapy Not dre Not dre N/A Not dre  N/A   Family Therapy Not dre Not dre N/A Not dre N/A   Other (Specify) Not dre Not dre N/A Not dre N/A     Patient attended 1 group session during this week. He is at phase 3 and attends only one group per week    Weekly Treatment Plan Review     Treatment Plan itiated on: 2022    Dimension1: Acute Intoxication/Withdrawal Potential -   Previous Dimension Ratin  Current Dimension Ratin  Date of Last Use 1/3/2022  Any reports of withdrawal symptoms - No    Narrative: Date of last use of alcohol-1/3/2022 in the evening.  At intake, patient reported after few days of sobriety, he was experiencing jitterly hands, some anxiety, sweat. However, patient did not report need for medical detox and states he was able to tolerate the symptoms. Since the intake, patient reports he is remaining sober and has not reported any issues with withdrawals. Patient is now in Phase 3 of treatment and has no withdrawal concerns.    Dimension 2: Biomedical Conditions & Complications -   Previous Dimension Ratin  Current Dimension Ratin  Medical Concerns:  None   Current Medications & Medication Changes:  Current Outpatient Medications   Medication     buPROPion (WELLBUTRIN) 75 MG tablet     Digital Therapy (RESET FOR IOS OR ANDROID JACQUELIN) MISC     fluticasone (FLONASE) 50 MCG/ACT nasal spray     No current facility-administered medications for this visit.     Medication Prescriber:  Joel Daniel Irwin Wegener MD, [facility]  Taking meds as prescribed? Yes  Medication side effects or concerns: No  Outside medical appointments this week (list provider and reason for visit):  None     Narratives: Pt reports his  medical and dental health are well at this time.  Pt has [facility] and able to access health care.  Pt has established primary care with Dr. Wally David at [facility]. Patient continues to report good physical health;  involved with regular physical exercises, and eating healthy. Pt states he started jogging since starting outpatient treatment, and feels his physical health is in good shape.  Pt reports besides the jogging, he also joined [employer] and taking classes as well to balance his workout. Pt reports preparing for the [employer] coming up in October this year.    Dimension 3: Emotional/Behavioral Conditions & Complications -   Previous Dimension Ratin  Current Dimension Ratin  PHQ9     PHQ-9 SCORE 2022   PHQ-9 Total Score 23     GAD7     FAISAL-7 SCORE 2021   Total Score 13 (moderate anxiety) -   Total Score 13 21     Mental health diagnosis None   Date of last SIB:  never   Date of  last SI:  never  Date of last HI: never  Behavioral Targets:  N/A  Current MH Assignments:  N/A    Narrative:  At the time of intake, patient did not report past or current mental health diagnoses, however pt reports he is on anti-depression medication prescribed by his primary doctor.  Pt continues to be opened to learning new skills.   Pt shares he is enjoying life without drinking.  Pt particularly talks about his exercise, gym, jogging, golfing and traveling plans for September and October.  Pt reports being selected for a strawberry pie contest in July in [facility]. Pt states he is part of the [employer] and meeting the local business owners and getting to know people.  Patient also talked about preparing for the up coming [employer] in October because he wants to participate in it. When asked what he is most proud of since getting sober, patient states that he is proud of getting healthy daily routine back (go to bed, exercise, work, socializing  "with healthy people. Etc.)    Dimension 4: Treatment Acceptance / Resistance -   Previous Dimension Ratin  Current Dimension Ratin  ELIJAH Diagnosis:  Alcohol Use Disorder   303.90 (F10.20) Severe .  Stage - 1  Commitment to tx process/Stage of change- Action  ELIJAH assignments - Maintain internal motivation for recovery by attending groups and listening to fellow group members' addiction stories     Narrative - Patient seems motivated for treatment.  Pt reports there is no pressure/mandate from court/probation/legal issues and he decided to seek help on his term.  Patient continues to manage his life, work and treatment.  Pt states he is feeling stronger with his recovery and treatment has been helpful. 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    Dimension 5: Relapse / Continued Problem Potential -   Previous Dimension Ratin  Current Dimension Rating:  3  Relapses this week - None  Urges to use - None  UA results - No results found for this or any previous visit (from the past 168 hour(s)).    Narrative- Last use of alcohol on 1/3/2022.  This was the date of original intake but patient admits he relapsed on that day. Pt reports the longest sobriety in his lifetime as 5-6 months on his own.  He appeared to have had  little insight into his triggers at the time of intake, as evidenced by his statement. \"I can't put my fingers around it.\"  However, patient states he gained good understanding in treatment about his alcohol use issues  Pt reports remaining sober throughout treatment and is working on improving relationships, maintaining his job, and increasing physical activities. Patient denies having any new concerns this week.    Dimension 6: Recovery Environment   Previous Dimension Ratin  Current Dimension Ratin  Family " Involvement -   Summarize attendance at family groups and family sessions - N/A  Family supportive of treatment?  Yes    Community support group attendance - No  Recreational activities - exercise (30 min jogging a day), busy at work    Narrative -  Patient reports his current incomes come from employment and investments. 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 Patient states he has college degree in [employer] and has been able to fulfill his occupational obligations.  He is currently living independently with his girlfriend and renting an apartment in [place]. Patient reports he kept his drinking to himself and none of his friends knew about the depth of his addiction or the fact that he was getting treatment.  Pt states his parents and girlfriends are supportive of his recovery. Patent does not report any past or current legal issues. Patient reports desire to become a professional golfer because he likes the lifestyle of traveling and seeing different places all over the world. Patient reports preparing himself for the [employer], coming up in October of this year.    Justification for Continued Treatment at this Level of Care: Patient reports desire to learn coping skills. Last alcohol use on 1/3/2022    Discharge Planning:  Target Discharge Date/Timeframe:  9/7/2022   Med Mgmt Provider/Appt:  MARYCRUZ   Ind therapy Provider/Appt:  N/A   Family therapy Provider/Appt:  N/A   Other referrals:  N/A      Has vulnerable adult status change? No    Service Coordination:  TBD    Supervision:  Every Wednesdays     Are Treatment Plan goals/objectives effective? Yes  *If no, list changes to treatment plan:    Are the current goals meeting client's needs? Yes  *If no, list the changes to treatment plan.    Client Input / Response: Patient admits his drinking became problem and expresses his desire to learn recovery skills.  Pt denies any cravings at this time.       *Client agrees with any changes to the  treatment plan: N/A  *Client received copy of changes: N/A  *Client is aware of right to access a treatment plan review: Yes

## 2022-07-26 ENCOUNTER — HOSPITAL ENCOUNTER (OUTPATIENT)
Dept: BEHAVIORAL HEALTH | Facility: CLINIC | Age: 37
Discharge: HOME | End: 2022-07-26
Attending: FAMILY MEDICINE
Payer: COMMERCIAL

## 2022-07-26 DIAGNOSIS — F10.20 ALCOHOL USE DISORDER, SEVERE, DEPENDENCE (H): ICD-10-CM

## 2022-07-26 PROCEDURE — H2035 A/D TX PROGRAM, PER HOUR: HCPCS | Mod: HQ,GT,95

## 2022-07-26 NOTE — GROUP NOTE
Group Therapy Documentation    PATIENT'S NAME: Albert Kumar  MRN:   [id number]  :   1985  ACCT. NUMBER: [id number]  DATE OF SERVICE: 22  START TIME: 12:30 PM  END TIME:  3:15 PM  FACILITATOR(S): Jacky Nash LADC  TOPIC: BEH Group Therapy  Number of patients attending the group:  3  Group Length:  3 hours    Group Therapy Type: Addiction and Psychoeducation    Summary of Group / Topics Discussed:    Psychoeducation/Skills Cognitive Restructuring (ELIJAH): Trauma and How It's Symptoms Affects Addiction  This topic will give a general overview of maladaptive patterns of thinking and techniques to change their thinking patterns to be supportive of health and recovery.    Objective(s):     Patients will identify three cognitive distortions    Patients will identify two ways to dispute distortions    Structure (modalities, homework, worksheets, etc):     Provide psychoeducation on cognitive distortions and disputations    Facilitate group discussion around each patient s cognitive distortions and impact of those thinking patterns    Expected therapeutic outcome(s):   Patient will:    Recognize and dispute distortions    Experience improved thinking and behavior    Therapeutic outcome(s) measured by:     Patient will name 2-3 cognitive distortions and ways to dispute them      Group Attendance:  Attended group session from 1:30 pm to 2:45 pm. Patient emailed before the group reporting he would be late for group due to his work. Once he arrived, he said he had a business meeting at 3 pm and had to leave early.      Patient's response to the group topic/interactions:  cooperative with task, discussed personal experience with topic, expressed readiness to alter behaviors, expressed understanding of topic, gave appropriate feedback to peers, listened actively and offered helpful suggestions to peers    Patient appeared to be Actively participating.        Client specific details:  Pt identified his  mood/emotion as 8 out of 10 and said he is maintaining his sobriety by keeping up his healthy routine (exercise and work).  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  Pt states since the article got issued, he is getting more business calls.  Group lecture was given around trauma and how it impacts people.  Pt states he was actually thinking of seeing a therapist to address his old trauma issues that he pushed away.  Pt said he was actually looking into My Chart to find a therapist appointment.  Pt states he would talk to his general practitioner also and get a referral.  Pt seems he is making good progress.      Video Visit:      Provider verified identity through the following two step process.  Patient provided:  Patient is known previously to provider    Telemedicine Visit: The patient's condition can be safely assessed and treated via synchronous audio and visual telemedicine encounter.      Reason for Telemedicine Visit: Patient has requested telehealth visit    Originating Site (Patient Location): Patient's home    Distant Site (Provider Location): Provider Remote Setting- Home Office    Consent:  The patient/guardian has verbally consented to: the potential risks and benefits of telemedicine (video visit) versus in person care; bill my insurance or make self-payment for services provided; and responsibility for payment of non-covered services.     Patient would like the video invitation sent by:  Send to e-mail at: [email or web address]    Mode of Communication:  Video Conference via Medical Zoom    As the provider I attest to compliance with applicable laws and regulations related to telemedicine.

## 2022-07-29 ENCOUNTER — DOCUMENTATION ONLY (OUTPATIENT)
Dept: ADDICTION MEDICINE | Facility: HOSPITAL | Age: 37
End: 2022-07-29

## 2022-07-29 NOTE — PROGRESS NOTES
[facility] Weekly Treatment Plan Review      ATTENDANCE for the following date span:  2022 to 2022    Date 22   Group Therapy NA 1 hours N/A Not Dre N/A   Individual Therapy Not dre Not dre N/A Not der  N/A   Family Therapy Not dre Not dre N/A Not dre N/A   Other (Specify) Not dre Not dre N/A Not der N/A     Patient attended  group this week with no absence.  Pt is at phase 3 and only attends one group per week.     Weekly Treatment Plan Review     Treatment Plan itiated on: 2022    Dimension1: Acute Intoxication/Withdrawal Potential -   Previous Dimension Ratin  Current Dimension Ratin  Date of Last Use 1/3/2022  Any reports of withdrawal symptoms - No    Narrative: Date of last use of alcohol-1/3/2022 in the evening.  At intake, patient reported after few days of sobriety, he was experiencing jitterly hands, some anxiety, sweat. However, patient did not report need for medical detox and states he was able to tolerate the symptoms. Since the intake, patient reports he is remaining sober and has not reported any issues with withdrawals. Patient is now in Phase 3 of treatment and has no withdrawal concerns.    Dimension 2: Biomedical Conditions & Complications -   Previous Dimension Ratin  Current Dimension Ratin  Medical Concerns:  None   Current Medications & Medication Changes:  Current Outpatient Medications   Medication     buPROPion (WELLBUTRIN) 75 MG tablet     Digital Therapy (RESET FOR IOS OR ANDROID JACQUELIN) MISC     fluticasone (FLONASE) 50 MCG/ACT nasal spray     No current facility-administered medications for this visit.     Medication Prescriber:  Joel Daniel Irwin Wegener MD, [facility]  Taking meds as prescribed? Yes  Medication side effects or concerns: No  Outside medical appointments this week (list provider and reason for visit):  None     Narratives: Pt reports his  medical and dental health are well at this time.  Pt has [facility] and able to access health care.  Pt has established primary care with Dr. Wally David at [facility]. Patient continues to report good physical health;  involved with regular physical exercises, and eating healthy. Pt states he started jogging since starting outpatient treatment, and feels his physical health is in good shape.  Pt reports besides the jogging, he also joined [employer] and taking classes as well to balance his workout. Pt reports preparing for the [employer] coming up in October this year.    Dimension 3: Emotional/Behavioral Conditions & Complications -   Previous Dimension Ratin  Current Dimension Ratin  PHQ9     PHQ-9 SCORE 2022   PHQ-9 Total Score 23     GAD7     FAISAL-7 SCORE 2021   Total Score 13 (moderate anxiety) -   Total Score 13 21     Mental health diagnosis None   Date of last SIB:  never   Date of  last SI:  never  Date of last HI: never  Behavioral Targets:  N/A  Current MH Assignments:  N/A    Narrative:  At the time of intake, patient did not report past or current mental health diagnoses, however pt reports he is on anti-depression medication prescribed by his primary doctor.  Pt continues to be opened to learning new skills.   Pt shares he is enjoying life without drinking.  Pt particularly talks about his exercise, gym, jogging, golfing and traveling plans for September and October.  Pt reports being selected for a strawberry pie contest in July in [facility]. Pt states he is part of the [employer] [place] and meeting the local business owners and getting to know people.  Patient also talked about preparing for the up coming [employer] [place] in October because he wants to participate in it. When asked what he is most proud of since getting sober, patient states that he is proud of getting healthy daily routine back (go to bed, exercise, work, socializing  "with healthy people. Etc.)  Pt reports his business and personal life are going very well.  Pt was introduced to trauma education this week this week.  Pt states he had been thinking about working with a therapist at some point to work on his own personal issues.  Pt states he is looking for a therapist.      Dimension 4: Treatment Acceptance / Resistance -   Previous Dimension Ratin  Current Dimension Ratin  ELIJAH Diagnosis:  Alcohol Use Disorder   303.90 (F10.20) Severe .  Stage - 1  Commitment to tx process/Stage of change- Action  ELIJAH assignments - Maintain internal motivation for recovery by attending groups and listening to fellow group members' addiction stories     Narrative - Patient seems motivated for treatment.  Pt reports there is no pressure/mandate from court/probation/legal issues and he decided to seek help on his term.  Patient continues to manage his life, work and treatment.  Pt states he is feeling stronger with his recovery and treatment has been helpful. 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    Dimension 5: Relapse / Continued Problem Potential -   Previous Dimension Ratin  Current Dimension Rating:  3  Relapses this week - None  Urges to use - None  UA results - No results found for this or any previous visit (from the past 168 hour(s)).    Narrative- Last use of alcohol on 1/3/2022.  This was the date of original intake but patient admits he relapsed on that day. Pt reports the longest sobriety in his lifetime as 5-6 months on his own.  He appeared to have had  little insight into his triggers at the time of intake, as evidenced by his statement. \"I can't put my fingers around it.\"  However, patient states he gained good understanding in treatment about his alcohol use issues  Pt reports remaining sober " throughout treatment and is working on improving relationships, maintaining his job, and increasing physical activities. Patient denies having any new concerns this week.    Dimension 6: Recovery Environment   Previous Dimension Ratin  Current Dimension Ratin  Family Involvement -   Summarize attendance at family groups and family sessions - N/A  Family supportive of treatment?  Yes    Community support group attendance - No  Recreational activities - exercise (30 min jogging a day), busy at work    Narrative -  Patient reports his current incomes come from employment and investments. 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 Patient states he has college degree in [employer] and has been able to fulfill his occupational obligations.  He is currently living independently with his girlfriend and renting an apartment in [place]. Patient reports he kept his drinking to himself and none of his friends knew about the depth of his addiction or the fact that he was getting treatment.  Pt states his parents and girlfriends are supportive of his recovery. Patent does not report any past or current legal issues. Patient reports desire to become a professional golfer because he likes the lifestyle of traveling and seeing different places all over the world. Patient reports preparing himself for the [employer], coming up in October of this year.    Justification for Continued Treatment at this Level of Care: Patient reports desire to learn coping skills. Last alcohol use on 1/3/2022    Discharge Planning:  Target Discharge Date/Timeframe:  2022   Med Mgmt Provider/Appt:  MARYCRUZ   Ind therapy Provider/Appt:  N/A   Family therapy Provider/Appt:  N/A   Other referrals:  N/A      Has vulnerable adult status change? No    Service Coordination:  TBD    Supervision:  Every      Are Treatment Plan goals/objectives effective? Yes  *If no, list changes to treatment plan:    Are the current goals  meeting client's needs? Yes  *If no, list the changes to treatment plan.    Client Input / Response: Patient admits his drinking became problem and expresses his desire to learn recovery skills.  Pt denies any cravings at this time.       *Client agrees with any changes to the treatment plan: N/A  *Client received copy of changes: N/A  *Client is aware of right to access a treatment plan review: Yes

## 2022-08-02 ENCOUNTER — HOSPITAL ENCOUNTER (OUTPATIENT)
Dept: BEHAVIORAL HEALTH | Facility: CLINIC | Age: 37
Discharge: HOME | End: 2022-08-02
Attending: FAMILY MEDICINE
Payer: COMMERCIAL

## 2022-08-02 DIAGNOSIS — F10.20 ALCOHOL USE DISORDER, SEVERE, DEPENDENCE (H): ICD-10-CM

## 2022-08-02 PROCEDURE — H2035 A/D TX PROGRAM, PER HOUR: HCPCS | Mod: HQ,GT,95

## 2022-08-02 NOTE — GROUP NOTE
Group Therapy Documentation    PATIENT'S NAME: Albert Kumar  MRN:   [id number]  :   1985  ACCT. NUMBER: [id number]  DATE OF SERVICE: 22  START TIME: 12:30 PM  END TIME:  3:15 PM  FACILITATOR(S): Jacky Nash LADC  TOPIC: BEH Group Therapy  Number of patients attending the group: 3  Group Length:  180 min    Group Therapy Type: Addiction and Psychoeducation    Summary of Group / Topics Discussed:    Psychoeducation/Skills Cognitive Restructuring (ELIJAH)  This topic will give a general overview of maladaptive patterns of thinking and techniques to change their thinking patterns to be supportive of health and recovery.    Objective(s):     Patients will identify three cognitive distortions    Patients will identify two ways to dispute distortions    Structure (modalities, homework, worksheets, etc):     Provide psychoeducation on cognitive distortions and disputations    Facilitate group discussion around each patient s cognitive distortions and impact of those thinking patterns    Expected therapeutic outcome(s):   Patient will:    Recognize and dispute distortions    Experience improved thinking and behavior    Therapeutic outcome(s) measured by:     Patient will name 2-3 cognitive distortions and ways to dispute them      Group Attendance:  Attended group session from 1:30 PM to group ending time.  Pt reported that he would be late to group due to his work meeting.       Patient's response to the group topic/interactions:  cooperative with task, expressed readiness to alter behaviors, expressed understanding of topic, gave appropriate feedback to peers, listened actively and offered helpful suggestions to peers    Patient appeared to be Actively participating.        Client specific details:  Pt identified today's mood/emotion as 9 and physical as 8 out of 10.  Pt states he is preparing to run [employer] full marathon in October and exercising and watching his nutrition.  Pt did not report  any changes today to his sobriety and said he is maintaining healthy routine.    Video Visit:      Provider verified identity through the following two step process.  Patient provided:  Patient is known previously to provider    Telemedicine Visit: The patient's condition can be safely assessed and treated via synchronous audio and visual telemedicine encounter.      Reason for Telemedicine Visit: Patient has requested telehealth visit    Originating Site (Patient Location): Patient's home    Distant Site (Provider Location): Provider Remote Setting- Home Office    Consent:  The patient/guardian has verbally consented to: the potential risks and benefits of telemedicine (video visit) versus in person care; bill my insurance or make self-payment for services provided; and responsibility for payment of non-covered services.     Patient would like the video invitation sent by:  Send to e-mail at: [email or web address]    Mode of Communication:  Video Conference via Medical Zoom    As the provider I attest to compliance with applicable laws and regulations related to telemedicine.

## 2022-08-05 ENCOUNTER — DOCUMENTATION ONLY (OUTPATIENT)
Dept: ADDICTION MEDICINE | Facility: HOSPITAL | Age: 37
End: 2022-08-05

## 2022-08-05 NOTE — PROGRESS NOTES
[facility] Weekly Treatment Plan Review      ATTENDANCE for the following date span:  2022 to 2022    Date Monday 8/1/22 Tuesday 8/2/22 Wednesday8/3/22 Thursday  8/4/22 Friday   2022   Group Therapy NA 2 hours N/A Not Dre N/A   Individual Therapy Not dre Not dre N/A Not dre  N/A   Family Therapy Not dre Not dre N/A Not dre N/A   Other (Specify) Not dre Not dre N/A Not dre N/A     Patient attended  group this week with no absence.  Pt is at phase 3 and only attends one group per week.     Weekly Treatment Plan Review     Treatment Plan itiated on: 2022    Dimension1: Acute Intoxication/Withdrawal Potential -   Previous Dimension Ratin  Current Dimension Ratin  Date of Last Use 1/3/2022  Any reports of withdrawal symptoms - No    Narrative: Date of last use of alcohol-1/3/2022 in the evening.  At intake, patient reported after few days of sobriety, he was experiencing jitterly hands, some anxiety, sweat. However, patient did not report need for medical detox and states he was able to tolerate the symptoms. Since the intake, patient reports he is remaining sober and has not reported any issues with withdrawals. Patient is now in Phase 3 of treatment and has no withdrawal concerns.    Dimension 2: Biomedical Conditions & Complications -   Previous Dimension Ratin  Current Dimension Ratin  Medical Concerns:  None   Current Medications & Medication Changes:  Current Outpatient Medications   Medication     buPROPion (WELLBUTRIN) 75 MG tablet     Digital Therapy (RESET FOR IOS OR ANDROID JACQUELIN) MISC     fluticasone (FLONASE) 50 MCG/ACT nasal spray     No current facility-administered medications for this visit.     Medication Prescriber:  Joel Daniel Irwin Wegener MD, [facility]  Taking meds as prescribed? Yes  Medication side effects or concerns: No  Outside medical appointments this week (list provider and reason for visit):  None     Narratives: Pt reports his medical  and dental health are well at this time.  Pt has [facility] and able to access health care.  Pt has established primary care with Dr. Wally David at [facility]. Patient continues to report good physical health;  involved with regular physical exercises, and eating healthy. Pt states he started jogging since starting outpatient treatment, and feels his physical health is in good shape.  Pt reports besides the jogging, he also joined [employer] and taking classes as well to balance his workout. Pt reports preparing for the [employer] coming up in October this year.    Dimension 3: Emotional/Behavioral Conditions & Complications -   Previous Dimension Ratin  Current Dimension Ratin  PHQ9     PHQ-9 SCORE 2022   PHQ-9 Total Score 23     GAD7     FAISAL-7 SCORE 2021   Total Score 13 (moderate anxiety) -   Total Score 13 21     Mental health diagnosis None   Date of last SIB:  never   Date of  last SI:  never  Date of last HI: never  Behavioral Targets:  N/A  Current  Assignments:  N/A    Narrative:  At the time of intake, patient did not report past or current mental health diagnoses, however pt reports he is on anti-depression medication prescribed by his primary doctor.  Pt continues to be opened to learning new skills.   Pt shares he is enjoying life without drinking.  Pt particularly talks about his exercise, gym, jogging, golfing and traveling plans for September and October.  Pt reports being selected for a strawberry pie contest in July in [facility]. Pt states he is part of the [employer] and meeting the local business owners and getting to know people.  Patient also talked about preparing for the up coming [employer] in October because he wants to participate in it. When asked what he is most proud of since getting sober, patient states that he is proud of getting healthy daily routine back (go to bed, exercise, work, socializing with  healthy people. Etc.)  Pt reports his business and personal life are going very well.  Pt was introduced to trauma education this week this week.  Pt states he had been thinking about working with a therapist at some point to work on his own personal issues.  Pt states he is looking for a therapist.  Pt reports he is preparing himself mentally and physically for [employer] in October.  Pt states he is signed up for full marathon and looking forward to this even.      Dimension 4: Treatment Acceptance / Resistance -   Previous Dimension Ratin  Current Dimension Ratin  ELIJAH Diagnosis:  Alcohol Use Disorder   303.90 (F10.20) Severe .  Stage - 1  Commitment to tx process/Stage of change- Action  ELIJAH assignments - Maintain internal motivation for recovery by attending groups and listening to fellow group members' addiction stories     Narrative - Patient seems motivated for treatment.  Pt reports there is no pressure/mandate from court/probation/legal issues and he decided to seek help on his term.  Patient continues to manage his life, work and treatment.  Pt states he is feeling stronger with his recovery and treatment has been helpful. 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    Dimension 5: Relapse / Continued Problem Potential -   Previous Dimension Ratin  Current Dimension Rating:  3  Relapses this week - None  Urges to use - None  UA results - No results found for this or any previous visit (from the past 168 hour(s)).    Narrative- Last use of alcohol on 1/3/2022.  This was the date of original intake but patient admits he relapsed on that day. Pt reports the longest sobriety in his lifetime as 5-6 months on his own.  He appeared to have had  little insight into his triggers at the time of intake, as evidenced by his  "statement. \"I can't put my fingers around it.\"  However, patient states he gained good understanding in treatment about his alcohol use issues  Pt reports remaining sober throughout treatment and is working on improving relationships, maintaining his job, and increasing physical activities. Patient denies having any new concerns this week.    Dimension 6: Recovery Environment   Previous Dimension Ratin  Current Dimension Ratin  Family Involvement -   Summarize attendance at family groups and family sessions - N/A  Family supportive of treatment?  Yes    Community support group attendance - No  Recreational activities - exercise (30 min jogging a day), busy at work    Narrative -  Patient reports his current incomes come from employment and investments. 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  He is currently living independently with his girlfriend and renting an apartment in [place]. Patient reports he kept his drinking to himself and none of his friends knew about the depth of his addiction or the fact that he was getting treatment.  Pt states his parents and girlfriends are supportive of his recovery. Patent does not report any past or current legal issues. Patient reports desire to become a professional golfer because he likes the lifestyle of traveling and seeing different places all over the world. Patient reports preparing himself for the [employer] [place], coming up in October of this year.    Justification for Continued Treatment at this Level of Care: Patient reports desire to learn coping skills. Last alcohol use on 1/3/2022    Discharge Planning:  Target Discharge Date/Timeframe:  2022   Med Mgmt Provider/Appt:  MARYCRUZ   Ind therapy Provider/Appt:  N/A   Family therapy Provider/Appt:  N/A   Other referrals:  N/A      Has vulnerable adult status change? No    Service " Coordination:  TBD    Supervision:  Every Wednesdays     Are Treatment Plan goals/objectives effective? Yes  *If no, list changes to treatment plan:    Are the current goals meeting client's needs? Yes  *If no, list the changes to treatment plan.    Client Input / Response: Patient admits his drinking became problem and expresses his desire to learn recovery skills.  Pt denies any cravings at this time.       *Client agrees with any changes to the treatment plan: N/A  *Client received copy of changes: N/A  *Client is aware of right to access a treatment plan review: Yes

## 2022-08-09 ENCOUNTER — HOSPITAL ENCOUNTER (OUTPATIENT)
Dept: BEHAVIORAL HEALTH | Facility: CLINIC | Age: 37
Discharge: HOME | End: 2022-08-09
Attending: FAMILY MEDICINE
Payer: COMMERCIAL

## 2022-08-09 DIAGNOSIS — F10.20 ALCOHOL USE DISORDER, SEVERE, DEPENDENCE (H): ICD-10-CM

## 2022-08-09 PROCEDURE — H2035 A/D TX PROGRAM, PER HOUR: HCPCS | Mod: HQ,GT,95

## 2022-08-09 NOTE — GROUP NOTE
"Group Therapy Documentation    PATIENT'S NAME: Albert Kumar  MRN:   [id number]  :   1985  ACCT. NUMBER: [id number]  DATE OF SERVICE: 22  START TIME: 12:30 PM  END TIME:  3:15 PM  FACILITATOR(S): Jacky Nash LADC  TOPIC: BEH Group Therapy  Number of patients attending the group:  3  Group Length:  180 min    Group Therapy Type: Addiction and Psychoeducation    Summary of Group / Topics Discussed:    Psychoeducation/Skills Cognitive Restructuring (ELIJAH)  This topic will give a general overview of maladaptive patterns of thinking and techniques to change their thinking patterns to be supportive of health and recovery.    Objective(s):     Patients will identify three cognitive distortions    Patients will identify two ways to dispute distortions    Structure (modalities, homework, worksheets, etc):     Provide psychoeducation on cognitive distortions and disputations    Facilitate group discussion around each patient s cognitive distortions and impact of those thinking patterns    Expected therapeutic outcome(s):   Patient will:    Recognize and dispute distortions    Experience improved thinking and behavior    Therapeutic outcome(s) measured by:     Patient will name 2-3 cognitive distortions and ways to dispute them      Group Attendance:  Attended group session    Patient's response to the group topic/interactions:  cooperative with task, discussed personal experience with topic, expressed readiness to alter behaviors, gave appropriate feedback to peers, listened actively and offered helpful suggestions to peers    Patient appeared to be Actively participating.        Client specific details: Pt identified today's mood/emotion as 8 out of 10.  Pt was able to identify more of his alcohol trigger today.  Pt said he used to have laziness/hopelessness type of attitude and told him self \"heck with it.  What's the point? \" and drank alcohol to cope.  Pt eventually pin-pointed that it was " overwhelming feeling that lead to these statements.  Pt state he is managing his triggers by removing himself from all situation that can cause overwhelming feelings and also by turning situation into manageable sizes.  Pt also states he stops now to take a look at situations from different perspectives.      Video Visit:      Provider verified identity through the following two step process.  Patient provided:  Patient is known previously to provider    Telemedicine Visit: The patient's condition can be safely assessed and treated via synchronous audio and visual telemedicine encounter.      Reason for Telemedicine Visit: Patient has requested telehealth visit    Originating Site (Patient Location): Patient's home    Distant Site (Provider Location): Provider Remote Setting- Home Office    Consent:  The patient/guardian has verbally consented to: the potential risks and benefits of telemedicine (video visit) versus in person care; bill my insurance or make self-payment for services provided; and responsibility for payment of non-covered services.     Patient would like the video invitation sent by:  Send to e-mail at: [email or web address]    Mode of Communication:  Video Conference via Medical Zoom    As the provider I attest to compliance with applicable laws and regulations related to telemedicine.

## 2022-08-12 ENCOUNTER — DOCUMENTATION ONLY (OUTPATIENT)
Dept: ADDICTION MEDICINE | Facility: HOSPITAL | Age: 37
End: 2022-08-12

## 2022-08-12 NOTE — PROGRESS NOTES
[facility] Weekly Treatment Plan Review      ATTENDANCE for the following date span:  2022 to 2022    Date Monday 8/8/22 Tuesday 8/9/22 Wednesday8/10/22 Thursday  8/11/22 Friday   2022   Group Therapy NA 2 hours N/A Not Dre N/A   Individual Therapy Not dre Not dre N/A Not dre  N/A   Family Therapy Not dre Not dre N/A Not dre N/A   Other (Specify) Not dre Not dre N/A Not dre N/A     Patient attended  group this week with no absence.  Pt is at phase 3 and only attends one group per week.     Weekly Treatment Plan Review     Treatment Plan itiated on: 2022    Dimension1: Acute Intoxication/Withdrawal Potential -   Previous Dimension Ratin  Current Dimension Ratin  Date of Last Use 1/3/2022  Any reports of withdrawal symptoms - No    Narrative: Date of last use of alcohol-1/3/2022 in the evening.  At intake, patient reported after few days of sobriety, he was experiencing jitterly hands, some anxiety, sweat. However, patient did not report need for medical detox and states he was able to tolerate the symptoms. Since the intake, patient reports he is remaining sober and has not reported any issues with withdrawals. Patient is now in Phase 3 of treatment and has no withdrawal concerns.    Dimension 2: Biomedical Conditions & Complications -   Previous Dimension Ratin  Current Dimension Ratin  Medical Concerns:  None   Current Medications & Medication Changes:  Current Outpatient Medications   Medication     buPROPion (WELLBUTRIN) 75 MG tablet     Digital Therapy (RESET FOR IOS OR ANDROID JACQUELIN) MISC     fluticasone (FLONASE) 50 MCG/ACT nasal spray     No current facility-administered medications for this visit.     Medication Prescriber:  Joel Daniel Irwin Wegener MD, [facility]  Taking meds as prescribed? Yes  Medication side effects or concerns: No  Outside medical appointments this week (list provider and reason for visit):  None     Narratives: Pt reports his  medical and dental health are well at this time.  Pt has [facility] and able to access health care.  Pt has established primary care with Dr. Wally David at [facility]. Patient continues to report good physical health;  involved with regular physical exercises, and eating healthy. Pt states he started jogging since starting outpatient treatment, and feels his physical health is in good shape.  Pt reports besides the jogging, he also joined [employer] and taking classes as well to balance his workout. Pt reports preparing for the [employer] coming up in October this year.    Dimension 3: Emotional/Behavioral Conditions & Complications -   Previous Dimension Ratin  Current Dimension Ratin  PHQ9     PHQ-9 SCORE 2022   PHQ-9 Total Score 23     GAD7     FAISAL-7 SCORE 2021   Total Score 13 (moderate anxiety) -   Total Score 13 21     Mental health diagnosis None   Date of last SIB:  never   Date of  last SI:  never  Date of last HI: never  Behavioral Targets:  N/A  Current MH Assignments:  N/A    Narrative:  At the time of intake, patient did not report past or current mental health diagnoses, however pt reports he is on anti-depression medication prescribed by his primary doctor.  Pt continues to be opened to learning new skills.   Pt shares he is enjoying life without drinking.  Pt particularly talks about his exercise, gym, jogging, golfing and traveling plans for September and October.  Pt reports being selected for a strawberry pie contest in July in [place]. Pt states he is now part of the [employer] [place] and meeting the local business owners and getting to know people.  Patient also talked about  preparing for the up coming [employer] in October.  Pt seems he is making good progress in this tx.  Pt has been educated on trauma and addiction.  Pt states he wants to look for an therapist whom he can address old unresolved issues so this does not  "become future triggers.       Dimension 4: Treatment Acceptance / Resistance -   Previous Dimension Ratin  Current Dimension Ratin  ELIJAH Diagnosis:  Alcohol Use Disorder   303.90 (F10.20) Severe .  Stage - 1  Commitment to tx process/Stage of change- Action  ELIJAH assignments - Maintain internal motivation for recovery by attending groups and listening to fellow group members' addiction stories because he wants to participate in it. When asked what he is most proud of since getting sober, patient states that he is proud of getting healthy daily routine back     Narrative - Patient seems motivated for treatment.  Pt reports there is no pressure/mandate from court/probation/legal issues and he decided to seek help on his term.  Patient continues to manage his life, work and treatment.  Pt states he is feeling stronger with his recovery and treatment has been helpful. 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    Dimension 5: Relapse / Continued Problem Potential -   Previous Dimension Ratin  Current Dimension Rating:  3  Relapses this week - None  Urges to use - None  UA results - No results found for this or any previous visit (from the past 168 hour(s)).    Narrative- Last use of alcohol on 1/3/2022.  This was the date of original intake but patient admits he relapsed on that day. Pt reports the longest sobriety in his lifetime as 5-6 months on his own.  He appeared to have had  little insight into his triggers at the time of intake, as evidenced by his statement. \"I can't put my fingers around it.\"  However, patient states he gained good understanding in treatment about his alcohol use issues  Pt reports remaining sober throughout treatment and is working on improving relationships, maintaining his job, and increasing physical activities. " Patient denies having any new concerns this week.    Dimension 6: Recovery Environment   Previous Dimension Ratin  Current Dimension Ratin  Family Involvement -   Summarize attendance at family groups and family sessions - N/A  Family supportive of treatment?  Yes    Community support group attendance - No  Recreational activities - exercise (30 min jogging a day), busy at work    Narrative -  Patient reports his current incomes come from employment and investments. 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 Patient states he has college degree in [employer] and has been able to fulfill his occupational obligations.  He is currently living independently with his girlfriend and renting an apartment in [place]. Patient reports he kept his drinking to himself and none of his friends knew about the depth of his addiction or the fact that he was getting treatment.  Pt states his parents and girlfriends are supportive of his recovery. Patent does not report any past or current legal issues. Patient reports desire to become a professional golfer because he likes the lifestyle of traveling and seeing different places all over the world. Patient reports preparing himself for the [employer], coming up in October of this year.    Justification for Continued Treatment at this Level of Care: Patient reports desire to learn coping skills. Last alcohol use on 1/3/2022    Discharge Planning:  Target Discharge Date/Timeframe:  2022   Med Mgmt Provider/Appt:  MARYCRUZ   Ind therapy Provider/Appt:  N/A   Family therapy Provider/Appt:  N/A   Other referrals:  N/A      Has vulnerable adult status change? No    Service Coordination:  TBD    Supervision:  Every      Are Treatment Plan goals/objectives effective? Yes  *If no, list changes to treatment plan:    Are the current goals meeting client's needs? Yes  *If no, list the changes to treatment plan.    Client Input / Response: Patient admits his  drinking became problem and expresses his desire to learn recovery skills.  Pt denies any cravings at this time.       *Client agrees with any changes to the treatment plan: N/A  *Client received copy of changes: N/A  *Client is aware of right to access a treatment plan review: Yes

## 2022-08-16 ENCOUNTER — DOCUMENTATION ONLY (OUTPATIENT)
Dept: ADDICTION MEDICINE | Facility: HOSPITAL | Age: 37
End: 2022-08-16

## 2022-08-16 NOTE — PROGRESS NOTES
ABSENT NOTE:    Albert Kumar was absent from group 8/16/2022 . This absence was excused. Patient emailed before the group start time to inform his absence due to work conflict. Patient is expected to attend group next Tuesday.     RONALDO Ravi    8/16/2022 , 3:42 PM

## 2022-08-19 ENCOUNTER — DOCUMENTATION ONLY (OUTPATIENT)
Dept: ADDICTION MEDICINE | Facility: HOSPITAL | Age: 37
End: 2022-08-19

## 2022-08-19 NOTE — PROGRESS NOTES
Weekly Progress Note 8/13/2022 to 9/19/2022  Albert Kumar  1985  [id number]      D) Pt attended ZERO groups  this week with 1 excused absences. R) Unable to assess along six dimensions or for VA due to lack of attendance.   T) Pt informed he would be attending group next week.    Jacky Nash MA [facility] 8/19/2022  2:45 pm

## 2022-08-20 ENCOUNTER — HEALTH MAINTENANCE LETTER (OUTPATIENT)
Age: 37
End: 2022-08-20

## 2022-08-23 ENCOUNTER — DOCUMENTATION ONLY (OUTPATIENT)
Dept: ADDICTION MEDICINE | Facility: HOSPITAL | Age: 37
End: 2022-08-23

## 2022-08-23 NOTE — PROGRESS NOTES
ABSENT NOTE:    Albert Kumar was absent from group 8/23/2022 . This absence was not excused. This writer reached out to the patient via email and asked why he was not in group.  Patient is expected to be in group on 8/30/2022     Jacky Nash [facility]  8/23/2022 , 2:05 PM

## 2022-08-24 ENCOUNTER — DOCUMENTATION ONLY (OUTPATIENT)
Dept: ADDICTION MEDICINE | Facility: HOSPITAL | Age: 37
End: 2022-08-24

## 2022-08-26 ENCOUNTER — DOCUMENTATION ONLY (OUTPATIENT)
Dept: ADDICTION MEDICINE | Facility: HOSPITAL | Age: 37
End: 2022-08-26

## 2022-08-26 NOTE — PROGRESS NOTES
Weekly Progress Note: 8/20/2022 to 8/26/2022  Albert Kumar  1985  [id number]      D) Pt attended ZERO groups  this week. Patient emailed the counselor on 8/25 and stated his week had been very busy with his work and he was unable to attend.  R) Unable to assess along six dimensions or for VA due to lack of attendance.   T) Patient will be in treatment on 8/30/2022  Jacky Nash [facility] 8/26/2022 2 pm

## 2022-08-30 ENCOUNTER — DOCUMENTATION ONLY (OUTPATIENT)
Dept: ADDICTION MEDICINE | Facility: HOSPITAL | Age: 37
End: 2022-08-30

## 2022-08-31 NOTE — PROGRESS NOTES
ABSENT NOTE:    Albert Kumar was absent from group 8/30/2022 . This absence was excused.  Patient contacted counselor before the group start time and asked if he can attend Thursday group this week.  Pt states it will make it easier for him due to work schedule.  Patient is expected to be in group on 9/1/2022.      RONALDO Ravi  8/30/2022 , 1:30 PM

## 2022-08-31 NOTE — PROGRESS NOTES
Late Entry from 8/24/2022    Addiction Outpatient Weekly Clinical Staffing     Albert Kumar was staffed on 8/31/2022 . Albert Kumar was staffed on recovery strengths, barriers and treatment progress.     Staff present: Kimberly Bright [facility], RONALDO Ravi, RONALDO Flores, RONALDO Doherty , Nandini Baez [facility]  and Debbie Alva [facility]    Date: 8/31/2022 Time: 3:46 PM    Staff Signature: RONALDO Ravi

## 2022-09-01 ENCOUNTER — HOSPITAL ENCOUNTER (OUTPATIENT)
Dept: BEHAVIORAL HEALTH | Facility: CLINIC | Age: 37
Discharge: HOME | End: 2022-09-01
Attending: FAMILY MEDICINE
Payer: COMMERCIAL

## 2022-09-01 DIAGNOSIS — F10.20 ALCOHOL USE DISORDER, SEVERE, DEPENDENCE (H): ICD-10-CM

## 2022-09-01 PROCEDURE — H2035 A/D TX PROGRAM, PER HOUR: HCPCS | Mod: HQ,GT,95

## 2022-09-01 NOTE — GROUP NOTE
Group Therapy Documentation    PATIENT'S NAME: Albert Kumar  MRN:   [id number]  :   1985  [id number]  DATE OF SERVICE: 22  START TIME: 12:30 PM  END TIME:  3:30 PM  FACILITATOR(S): Jacky Nash LADC  TOPIC: BEH Group Therapy  Number of patients attending the group:  3  Group Length: 3 hours    Group Therapy Type: Addiction and Psychoeducation    Summary of Group / Topics Discussed:    Psychoeducation/Skills Neurobiology (ELIJAH)  This topic will give a general overview of the brain chemistry involved with addiction.    Objective(s):    Client will identify at least 2 primary neurotransmitters involved with addiction.    Client will identify basic brain regions involved with addiction.    Client will be able to explain the relationship of brain chemistry to post-acute withdrawal symptoms.    Structure:    Provide psychoeducation on neurotransmitters and brain regions involved in addiction to illustrate how frontal cortex executive functioning is diminished by substance use disorder.    Provide psychoeducation on how biochemical imbalances resulting from substance use may lead to PAWS.    Facilitate group discussion around each patient s current beliefs of whether addiction is a disease or a moral failing and how that may have changed.    Facilitate group discussion around each patient s current symptomology.    Use teach-back techniques to ensure patients  understanding.    Provide patients with handouts to enhance learning.    Expected therapeutic outcomes:     Reduce confusion about post-acute withdrawal symptoms to better manage them.    Therapeutic outcome(s) measured by:    Patient s ability to teach-back information learned in group.    Patient s demonstration of learning by completion of post-quiz.      Group Attendance:  Attended group session    Patient's response to the group topic/interactions:  cooperative with task, expressed readiness to alter behaviors, expressed  understanding of topic, gave appropriate feedback to peers and listened actively    Patient appeared to be Actively participating.        Client specific details:  Patient identified today's emotion as 8 out of 10.  Pt shared his experience and observation on how he witnessed addiction in [place]. Pt also spoke with opinion for need for social change in terms of shelter.  Pt states he has a friend who is in shelter for the past 3 years and he wants to be part of changing the society.  Pt states he is doing what he can and reaching out to city representative when necessary.  Pt states he is maintaining his sobriety.  Pt seems making progress.        Video Visit:      Provider verified identity through the following two step process.  Patient provided:  Patient is known previously to provider    Telemedicine Visit: The patient's condition can be safely assessed and treated via synchronous audio and visual telemedicine encounter.      Reason for Telemedicine Visit: Patient has requested telehealth visit    Originating Site (Patient Location): Patient's home    Distant Site (Provider Location): Provider Remote Setting- Home Office    Consent:  The patient/guardian has verbally consented to: the potential risks and benefits of telemedicine (video visit) versus in person care; bill my insurance or make self-payment for services provided; and responsibility for payment of non-covered services.     Patient would like the video invitation sent by:  Send to e-mail at: [email or web address]    Mode of Communication:  Video Conference via Medical Zoom    As the provider I attest to compliance with applicable laws and regulations related to telemedicine.

## 2022-09-02 ENCOUNTER — DOCUMENTATION ONLY (OUTPATIENT)
Dept: ADDICTION MEDICINE | Facility: HOSPITAL | Age: 37
End: 2022-09-02

## 2022-09-06 ENCOUNTER — HOSPITAL ENCOUNTER (OUTPATIENT)
Dept: BEHAVIORAL HEALTH | Facility: CLINIC | Age: 37
Discharge: HOME | End: 2022-09-06
Attending: FAMILY MEDICINE
Payer: COMMERCIAL

## 2022-09-06 DIAGNOSIS — F10.20 ALCOHOL USE DISORDER, SEVERE, DEPENDENCE (H): ICD-10-CM

## 2022-09-06 PROCEDURE — H2035 A/D TX PROGRAM, PER HOUR: HCPCS | Mod: HQ,GT,95

## 2022-09-06 NOTE — GROUP NOTE
Group Therapy Documentation    PATIENT'S NAME: Albert Kumar  MRN:   [id number]  :   1985  [id number]  DATE OF SERVICE: 22  START TIME: 12:30 PM  END TIME:  3:30 PM  FACILITATOR(S): Jacky Nash LADC  TOPIC: BEH Group Therapy  Number of patients attending the group:  3  Group Length:  3 hours    Group Therapy Type: Addiction and Psychoeducation    Summary of Group / Topics Discussed:    Psychoeducation/Skills Relapse Prevention (ELIJAH)  This topic will give a general overview of basic relapse prevention, including definitions of warning signs, triggers and cravings and the importance of addressing healthy coping skills for ongoing relapse prevention.    Objective(s):    Patients will identify 4 key warning signs and triggers    Patients will identify 4 healthy coping mechanisms         Structure (modalities, homework, worksheets, etc.):    Provide psychoeducation on relapse prevention and healthy coping methods.    Facilitate group discussion around each patient s current awareness of warning signs,  triggers and cravings.     Expected therapeutic outcome(s):    Patient will:    Be able to manage triggers and cravings without returning to substance use.      Therapeutic outcomes measured by:    Patients will name 4 of their warning signs and triggers    Patients will name 4 healthy coping mechanisms for dealing with their warning signs and triggers      Group Attendance:  Attended group session    Patient's response to the group topic/interactions:  cooperative with task, discussed personal experience with topic, expressed understanding of topic, listened actively and offered helpful suggestions to peers    Patient appeared to be Actively participating.        Client specific details: Patient identified today's emotion as 9 out of 10.  Pt states he is going to Thursday night AA meeting at [facility] and this has been helping him to stay sober. Pt states since getting sober his  "anxiety is \"gone\".  Pt states he will be leaving to [place] this Thursday for a weekend trip and coming back Monday.  Pt states he will attend treatment group as scheduled next week.     Video Visit:      Provider verified identity through the following two step process.  Patient provided:  Patient is known previously to provider    Telemedicine Visit: The patient's condition can be safely assessed and treated via synchronous audio and visual telemedicine encounter.      Reason for Telemedicine Visit: Patient has requested telehealth visit    Originating Site (Patient Location): Patient's home    Distant Site (Provider Location): Provider Remote Setting- Home Office    Consent:  The patient/guardian has verbally consented to: the potential risks and benefits of telemedicine (video visit) versus in person care; bill my insurance or make self-payment for services provided; and responsibility for payment of non-covered services.     Patient would like the video invitation sent by:  Send to e-mail at: [email or web address]    Mode of Communication:  Video Conference via Medical Zoom    As the provider I attest to compliance with applicable laws and regulations related to telemedicine.    "

## 2022-09-09 ENCOUNTER — DOCUMENTATION ONLY (OUTPATIENT)
Dept: ADDICTION MEDICINE | Facility: HOSPITAL | Age: 37
End: 2022-09-09

## 2022-09-09 NOTE — PROGRESS NOTES
[facility] Weekly Treatment Plan Review      ATTENDANCE for the following date span:  9/3/2022 to 2022    Date 22   Group Therapy NA 3 hours N/A NA N/A   Individual Therapy        Family Therapy        Other (Specify)          Patient attended  group this week with no absence       Weekly Treatment Plan Review     Treatment Plan itiated on: 2022    Dimension1: Acute Intoxication/Withdrawal Potential -   Previous Dimension Ratin  Current Dimension Ratin  Date of Last Use 1/3/2022  Any reports of withdrawal symptoms - No    Narrative: Date of last use of alcohol-1/3/2022 in the evening.  At intake, patient reported after few days of sobriety, he was experiencing jitterly hands, some anxiety, sweat. However, patient did not report need for medical detox and states he was able to tolerate the symptoms. Since the intake, patient reports he is remaining sober and has not reported any issues with withdrawals. Patient is now in Phase 3 of treatment and has no withdrawal concerns.  Pt was educated on PAWS.  Pt states with exercise, healthy eating and sobriety, he has not experienced any PAWS at this time.      Dimension 2: Biomedical Conditions & Complications -   Previous Dimension Ratin  Current Dimension Ratin  Medical Concerns:  None   Current Medications & Medication Changes:  Current Outpatient Medications   Medication     buPROPion (WELLBUTRIN) 75 MG tablet     Digital Therapy (RESET FOR IOS OR ANDROID JACQUELIN) MISC     fluticasone (FLONASE) 50 MCG/ACT nasal spray     No current facility-administered medications for this visit.     Medication Prescriber:  Joel Daniel Irwin Wegener MD, [facility]  Taking meds as prescribed? Yes  Medication side effects or concerns: No  Outside medical appointments this week (list provider and reason for visit):  None     Narratives: Pt reports his medical and dental  health are well at this time.  Pt has [employer] and able to access health care.  Pt has established primary care with Dr. Wally David at [facility]. Patient continues to report good physical health;  involved with regular physical exercises, and eating healthy. Pt states he started jogging since starting outpatient treatment, and feels his physical health is in good shape.  Pt reports besides the jogging, he also joined [employer] and taking classes as well to balance his workout. Pt reports preparing for the [employer] coming up in October this year.    Dimension 3: Emotional/Behavioral Conditions & Complications -   Previous Dimension Ratin  Current Dimension Ratin  PHQ9     PHQ-9 SCORE 2022   PHQ-9 Total Score 23     GAD7     FAISAL-7 SCORE 2021   Total Score 13 (moderate anxiety) -   Total Score 13 21     Mental health diagnosis None   Date of last SIB:  never   Date of  last SI:  never  Date of last HI: never  Behavioral Targets:  N/A  Current MH Assignments:  N/A    Narrative:  At the time of intake, patient did not report past or current mental health diagnoses, however pt reports he is on anti-depression medication prescribed by his primary doctor.  Pt continues to be opened to learning new skills.   Pt shares he is enjoying life without drinking.  Pt particularly talks about his exercise, gym, jogging, golfing and traveling plans for September and October.  Pt reports being selected for a strawberry pie contest in July in [facility]. Pt states he is now part of the [employer] [place] and meeting the local business owners and getting to know people.  Patient also talked about  preparing for the up coming [employer] in October.  Pt seems he is making good progress in this tx.  Pt has been educated on trauma and addiction.  Pt states he wants to look for an therapist whom he can address old unresolved issues so this does not become future  "triggers.  Pt was educated on Risk Factors and Protective Factors in addiction.  Pt identifies although he grew up in [place] where drinking was prevalent, he now has family support and daily exercise as protective factors.   Pt continues to make progress and participate well in group discussion.        Dimension 4: Treatment Acceptance / Resistance -   Previous Dimension Ratin  Current Dimension Ratin  ELIJAH Diagnosis:  Alcohol Use Disorder   303.90 (F10.20) Severe .  Stage - 1  Commitment to tx process/Stage of change- Action  ELIJAH assignments - Maintain internal motivation for recovery by attending groups and listening to fellow group members' addiction stories because he wants to participate in it. When asked what he is most proud of since getting sober, patient states that he is proud of getting healthy daily routine back     Narrative - Patient seems motivated for treatment.  Pt reports there is no pressure/mandate from court/probation/legal issues and he decided to seek help on his term.  Patient continues to manage his life, work and treatment.  Pt states he is feeling stronger with his recovery and treatment has been helpful. 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    Dimension 5: Relapse / Continued Problem Potential -   Previous Dimension Ratin  Current Dimension Rating:  3  Relapses this week - None  Urges to use - None  UA results - No results found for this or any previous visit (from the past 168 hour(s)).    Narrative- Last use of alcohol on 1/3/2022.  This was the date of original intake but patient admits he relapsed on that day. Pt reports the longest sobriety in his lifetime as 5-6 months on his own.  He appeared to have had  little insight into his triggers at the time of intake, as evidenced by his statement. \"I " "can't put my fingers around it.\"  However, patient states he gained good understanding in treatment about his alcohol use issues  Pt reports remaining sober throughout treatment and is working on improving relationships, maintaining his job, and increasing physical activities. Patient denies having any new concerns this week.    Dimension 6: Recovery Environment   Previous Dimension Ratin  Current Dimension Ratin  Family Involvement -   Summarize attendance at family groups and family sessions - N/A  Family supportive of treatment?  Yes    Community support group attendance - No  Recreational activities - exercise (30 min jogging a day), busy at work    Narrative -  Patient reports his current incomes come from employment and investments. 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 Patient states he has college degree in [employer] and has been able to fulfill his occupational obligations.  He is currently living independently with his girlfriend and renting an apartment in [place]. Patient reports he kept his drinking to himself and none of his friends knew about the depth of his addiction or the fact that he was getting treatment.  Pt states his parents and girlfriends are supportive of his recovery. Patent does not report any past or current legal issues. Patient reports desire to become a professional golfer because he likes the lifestyle of traveling and seeing different places all over the world. Patient reports preparing himself for the [employer], coming up in October of this year.    Justification for Continued Treatment at this Level of Care: Patient reports desire to learn coping skills. Last alcohol use on 1/3/2022    Discharge Planning:  Target Discharge Date/Timeframe:  2022   Med Mgmt Provider/Appt:  TBD   Ind therapy Provider/Appt:  N/A   Family therapy Provider/Appt:  N/A   Other referrals:  N/A      Has vulnerable adult status change? No    Service Coordination:  " TBD    Supervision:  Every Wednesdays     Are Treatment Plan goals/objectives effective? Yes  *If no, list changes to treatment plan:    Are the current goals meeting client's needs? Yes  *If no, list the changes to treatment plan.    Client Input / Response: Patient admits his drinking became problem and expresses his desire to learn recovery skills.  Pt denies any cravings at this time.       *Client agrees with any changes to the treatment plan: N/A  *Client received copy of changes: N/A  *Client is aware of right to access a treatment plan review: Yes

## 2022-09-13 ENCOUNTER — HOSPITAL ENCOUNTER (OUTPATIENT)
Dept: BEHAVIORAL HEALTH | Facility: CLINIC | Age: 37
Discharge: HOME | End: 2022-09-13
Attending: FAMILY MEDICINE
Payer: COMMERCIAL

## 2022-09-13 PROCEDURE — H2035 A/D TX PROGRAM, PER HOUR: HCPCS | Mod: GT,95

## 2022-09-13 NOTE — PROGRESS NOTES
"Individual Session     Video Visit:      Provider verified identity through the following two step process.  Patient provided:  Patient is known previously to provider    Telemedicine Visit: The patient's condition can be safely assessed and treated via synchronous audio and visual telemedicine encounter.      Reason for Telemedicine Visit: Patient has requested telehealth visit    Originating Site (Patient Location): Patient's home    Distant Site (Provider Location): Provider Remote Setting- Home Office    Consent:  The patient/guardian has verbally consented to: the potential risks and benefits of telemedicine (video visit) versus in person care; bill my insurance or make self-payment for services provided; and responsibility for payment of non-covered services.     Patient would like the video invitation sent by:  Send to e-mail at: [email or web address]    Mode of Communication:  Video Conference via Medical Zoom    As the provider I attest to compliance with applicable laws and regulations related to telemedicine.      D)Patient attended 1 hour of individual session from 12:30 to 1:30 pm.   Pt states he just got [place] last [place].  Pt shared stories from the trip. Pt said he his friends were understanding of his sobriety and he was able to have peaceful and fun trip.  Pt said he did not experience any cravings even when he went out to dinner and other people ordered drinks.  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   We had a discussion on \"Honoring other people's choices\". Pt related to the subject and shared his experience.  Pt was educated on \"Why We Addicted\".  We will continue on the discussion on this subject.    Jacky Nash MA [facility] 9/13/2022  2 pm   "

## 2022-09-14 DIAGNOSIS — F10.20 ALCOHOL USE DISORDER, SEVERE, DEPENDENCE (H): Primary | ICD-10-CM

## 2022-09-16 ENCOUNTER — DOCUMENTATION ONLY (OUTPATIENT)
Dept: ADDICTION MEDICINE | Facility: HOSPITAL | Age: 37
End: 2022-09-16

## 2022-09-16 NOTE — PROGRESS NOTES
[facility] Weekly Treatment Plan Review      ATTENDANCE for the following date span:  9/10/2022 to 2022    Date Monday 9/12/22 Tuesday 9/13/22 Wednesday9/14/22 Thursday  9/15/22 Friday   2022   Group Therapy NA 1 hour N/A NA N/A   Individual Therapy        Family Therapy        Other (Specify)          Patient attended  group this week with no absence       Weekly Treatment Plan Review     Treatment Plan itiated on: 2022    Dimension1: Acute Intoxication/Withdrawal Potential -   Previous Dimension Ratin  Current Dimension Ratin  Date of Last Use 1/3/2022  Any reports of withdrawal symptoms - No    Narrative: Date of last use of alcohol-1/3/2022 in the evening.  At intake, patient reported after few days of sobriety, he was experiencing jitterly hands, some anxiety, sweat. However, patient did not report need for medical detox and states he was able to tolerate the symptoms. Since the intake, patient reports he is remaining sober and has not reported any issues with withdrawals. Patient is now in Phase 3 of treatment and has no withdrawal concerns.  Pt was educated on PAWS.  Pt states with exercise, healthy eating and sobriety, he has not experienced any PAWS at this time.      Dimension 2: Biomedical Conditions & Complications -   Previous Dimension Ratin  Current Dimension Ratin  Medical Concerns:  None   Current Medications & Medication Changes:  Current Outpatient Medications   Medication     buPROPion (WELLBUTRIN) 75 MG tablet     Digital Therapy (RESET FOR IOS OR ANDROID JACQUELIN) MISC     fluticasone (FLONASE) 50 MCG/ACT nasal spray     No current facility-administered medications for this visit.     Medication Prescriber:  Joel Daniel Irwin Wegener MD, [facility]  Taking meds as prescribed? Yes  Medication side effects or concerns: No  Outside medical appointments this week (list provider and reason for visit):  None     Narratives: Pt reports his medical and  dental health are well at this time.  Pt has [employer] and able to access health care.  Pt has established primary care with Dr. Wally David at [facility]. Patient continues to report good physical health;  involved with regular physical exercises, and eating healthy. Pt states he started jogging since starting outpatient treatment, and feels his physical health is in good shape.  Pt reports besides the jogging, he also joined [employer] and taking classes as well to balance his workout. Pt reports preparing for the [employer] coming up in October this year.  Pt did not report any changes this week.  Pt states he was able to remain sober during [place] and gaining more confidence.    After the truama and addiction session in recent months, pt reported he was ready to tackle past unresolved issues. Pt was referred to a psych evaluation so he can establish on-going therapy if recommended.    Dimension 3: Emotional/Behavioral Conditions & Complications -   Previous Dimension Ratin  Current Dimension Ratin  PHQ9     PHQ-9 SCORE 2022   PHQ-9 Total Score 23     GAD7     FAISAL-7 SCORE 2021   Total Score 13 (moderate anxiety) -   Total Score 13 21     Mental health diagnosis None   Date of last SIB:  never   Date of  last SI:  never  Date of last HI: never  Behavioral Targets:  N/A  Current MH Assignments:  N/A    Narrative:  At the time of intake, patient did not report past or current mental health diagnoses, however pt reports he is on anti-depression medication prescribed by his primary doctor.  Pt continues to be opened to learning new skills.   Pt shares he is enjoying life without drinking.  Pt particularly talks about his exercise, gym, jogging, golfing and traveling plans for September and October.  Pt reports being selected for a strawberry pie contest in July in [place]. Pt states he is now part of the [employer] and meeting the local  business owners and getting to know people.  Patient also talked about  preparing for the up coming [employer] [place] in October.  Pt seems he is making good progress in this tx.  Pt has been educated on trauma and addiction.  Pt states he wants to look for an therapist whom he can address old unresolved issues so this does not become future triggers.  Pt was educated on Risk Factors and Protective Factors in addiction.  Pt identifies although he grew up in [place] where drinking was prevalent, he now has family support and daily exercise as protective factors.   Pt continues to make progress and participate well in group discussion.        Dimension 4: Treatment Acceptance / Resistance -   Previous Dimension Ratin  Current Dimension Ratin  ELIJAH Diagnosis:  Alcohol Use Disorder   303.90 (F10.20) Severe .  Stage - 1  Commitment to tx process/Stage of change- Action  ELIJAH assignments - Maintain internal motivation for recovery by attending groups and listening to fellow group members' addiction stories because he wants to participate in it. When asked what he is most proud of since getting sober, patient states that he is proud of getting healthy daily routine back     Narrative - Patient seems motivated for treatment.  Pt reports there is no pressure/mandate from court/probation/legal issues and he decided to seek help on his term.  Patient continues to manage his life, work and treatment.  Pt states he is feeling stronger with his recovery and treatment has been helpful. 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    Dimension 5: Relapse / Continued Problem Potential -   Previous Dimension Ratin  Current Dimension Rating:  3  Relapses this week - None  Urges to use - None  UA results - No results found for this or any previous visit  "(from the past 168 hour(s)).    Narrative- Last use of alcohol on 1/3/2022.  This was the date of original intake but patient admits he relapsed on that day. Pt reports the longest sobriety in his lifetime as 5-6 months on his own.  He appeared to have had  little insight into his triggers at the time of intake, as evidenced by his statement. \"I can't put my fingers around it.\"  However, patient states he gained good understanding in treatment about his alcohol use issues  Pt reports remaining sober throughout treatment and is working on improving relationships, maintaining his job, and increasing physical activities. Patient denies having any new concerns this week.    Dimension 6: Recovery Environment   Previous Dimension Ratin  Current Dimension Ratin  Family Involvement -   Summarize attendance at family groups and family sessions - N/A  Family supportive of treatment?  Yes    Community support group attendance - No  Recreational activities - exercise (30 min jogging a day), busy at work    Narrative -  Patient reports his current incomes come from employment and investments. 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  He is currently living independently with his girlfriend and renting an apartment in [place]. Patient reports he kept his drinking to himself and none of his friends knew about the depth of his addiction or the fact that he was getting treatment.  Pt states his parents and girlfriends are supportive of his recovery. Patent does not report any past or current legal issues. Patient reports desire to become a professional golfer because he likes the lifestyle of traveling and seeing different places all over the world. Patient reports preparing himself for the [employer], coming up in October of this year.    Justification for Continued Treatment at this Level of " Care: Patient reports desire to learn coping skills. Last alcohol use on 1/3/2022    Discharge Planning:  Target Discharge Date/Timeframe:  9/7/2022   Med Mgmt Provider/Appt:  MARYCRUZ   Ind therapy Provider/Appt:  N/A   Family therapy Provider/Appt:  N/A   Other referrals:  N/A      Has vulnerable adult status change? No    Service Coordination:  TBD    Supervision:  Every Wednesdays     Are Treatment Plan goals/objectives effective? Yes  *If no, list changes to treatment plan:    Are the current goals meeting client's needs? Yes  *If no, list the changes to treatment plan.    Client Input / Response: Patient admits his drinking became problem and expresses his desire to learn recovery skills.  Pt denies any cravings at this time.       *Client agrees with any changes to the treatment plan: N/A  *Client received copy of changes: N/A  *Client is aware of right to access a treatment plan review: Yes

## 2022-09-20 ENCOUNTER — TELEPHONE (OUTPATIENT)
Dept: BEHAVIORAL HEALTH | Facility: CLINIC | Age: 37
End: 2022-09-20

## 2022-09-20 ENCOUNTER — DOCUMENTATION ONLY (OUTPATIENT)
Dept: ADDICTION MEDICINE | Facility: HOSPITAL | Age: 37
End: 2022-09-20

## 2022-09-20 NOTE — TELEPHONE ENCOUNTER
Writer spoke with pt and scheduled initial TC therapy appointment on 09/22/22 @ 2:30 pm. Writer sent intake documents via [employer]. Writer will reply to referral source.Tracker completed.    Alanis Gallardo  09/20/22  125    ----- Message from Jayson Brown sent at 9/20/2022  1:12 PM CDT -----  Transition Clinic Referral   [place] (Limited)        Please Check Type of Referral Requested:       _X___THERAPY: The Transition clinic is able to schedule patients without current medical insurance; these patient will be referred to our Social Work Care Coordinator for Medical Insurance              Assistance. We are open for referral for psychotherapy. Patient is referred from:  Other   [facility]DI[facility]IC[facility]      ____MEDICATION:  Referrals for Medication are ONLY accepted from the following areas (select): na                                       Suboxone and Opioid Management Referrals are automatically denied. TC Psychiatry cannot see patient without active medical insurance.         Referring Provider Contact Name: Willie Romero MD; Phone Number: unk    Reason for Transition Clinic Referral: F10.20 (ICD-10-CM) - Alcohol use disorder, severe, dependence (H)    Next Level of Care Patient Will Be Transitioned To:   Provider(s)  Location   Date/Time  1/20/2023 Status: Scheduled  Time: 11:00 AM Length: 60  Visit Type: ADULT PSYCHOTHERAPY NEW [[id number]] Copay: $0.00  Provider: Aleyda Lockett LICSW Department: [facility]EREMY  Bill Area: Psychology CS  Encounter #: [id number]          What Would Be Helpful from the Transition Clinic: bridge gap     Needs: NO    Does Patient Have Access to Technology: yes    Patient E-mail Address: [email or web address]    Current Patient Phone Number: [phone];     Clinician Gender Preference (if applicable): NO    Jayson Brown

## 2022-09-20 NOTE — PROGRESS NOTES
ABSENT NOTE:    Albert Kumar was absent from group 9/20/2022 . This absence was excused.  Patient contacted counselor when the group started.  Pt said his apartment is having a Internet connection issues.   Patient was suggested he can join group on Thursday 9/22 or otherwise he is expected to attend group on 9/27.      RONALDO Ravi  9/20/2022 , 1:43 PM

## 2022-09-21 ENCOUNTER — DOCUMENTATION ONLY (OUTPATIENT)
Dept: ADDICTION MEDICINE | Facility: HOSPITAL | Age: 37
End: 2022-09-21

## 2022-09-21 NOTE — PROGRESS NOTES
Addiction Outpatient Weekly Clinical Staffing     Albert Kumar was staffed on 9/21/2022 . Albert Kumar was staffed on recovery strengths, barriers and treatment progress.     Staff present: RONALDO Ravi, Erma Andrea [facility], Fiona Choudhury [facility], [facility], Merle Lu [facility] , Nandini Baez [facility] , Debbie Alva [facility], Donald Welander, [facility] and Eamon Harrison [facility]     Date: 9/21/2022 Time: 3:41 PM    Staff Signature: RONALDO Ravi

## 2022-09-22 ENCOUNTER — HOSPITAL ENCOUNTER (OUTPATIENT)
Dept: BEHAVIORAL HEALTH | Facility: CLINIC | Age: 37
Discharge: HOME | End: 2022-09-22
Attending: FAMILY MEDICINE
Payer: COMMERCIAL

## 2022-09-22 ENCOUNTER — VIRTUAL VISIT (OUTPATIENT)
Dept: BEHAVIORAL HEALTH | Facility: CLINIC | Age: 37
End: 2022-09-22

## 2022-09-22 DIAGNOSIS — F39 AFFECTIVE DISORDER (H): ICD-10-CM

## 2022-09-22 DIAGNOSIS — F10.21 ALCOHOL USE DISORDER, SEVERE, IN SUSTAINED REMISSION, DEPENDENCE (H): Primary | ICD-10-CM

## 2022-09-22 PROCEDURE — H2035 A/D TX PROGRAM, PER HOUR: HCPCS | Mod: GT,95

## 2022-09-22 NOTE — PROGRESS NOTES
Individual Session     Video Visit:      Provider verified identity through the following two step process.  Patient provided:  Patient is known previously to provider    Telemedicine Visit: The patient's condition can be safely assessed and treated via synchronous audio and visual telemedicine encounter.      Reason for Telemedicine Visit: Patient has requested telehealth visit    Originating Site (Patient Location): Patient's home    Distant Site (Provider Location): [facility] [place]    Consent:  The patient/guardian has verbally consented to: the potential risks and benefits of telemedicine (video visit) versus in person care; bill my insurance or make self-payment for services provided; and responsibility for payment of non-covered services.     Patient would like the video invitation sent by:  Send to e-mail at: [email or web address]    Mode of Communication:  Video Conference via Medical Zoom    As the provider I attest to compliance with applicable laws and regulations related to telemedicine.  D)Patient attended 1 hour of individual session from 12:30 to 1:30 pm.    A)Pt informed that he followed through with mental health referral and he has an appointment today at 2:30 pm.    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  We talked about his progress in treatment and how this will be a good time to term the service.  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   Pt states he is scheduled to run full marathon this weekend at [facility] and he will start the class on Monday.     T)Patient made good progress in this outpatient treatment.  Patient will be discharged 2022.      Jacky  Charity NARAYAN [facility] 9/22/2022  1:40pm

## 2022-09-22 NOTE — PROGRESS NOTES
[facility]   Mental Health & Addiction Services     Progress Note - Initial Visit    Patient  Name:  Albert Kumar Date: 2022         Service Type: Individual     Visit Start Time: 1430  Visit End Time:     Visit #: 1    Attendees: Client attended alone    Service Modality:  Video Visit:      Provider verified identity through the following two step process.  Patient provided:  Patient  and Patient address    Telemedicine Visit: The patient's condition can be safely assessed and treated via synchronous audio and visual telemedicine encounter.      Reason for Telemedicine Visit: Patient has requested telehealth visit    Originating Site (Patient Location): Patient's home    Distant Site (Provider Location): [facility]: [facility]    Consent:  The patient/guardian has verbally consented to: the potential risks and benefits of telemedicine (video visit) versus in person care; bill my insurance or make self-payment for services provided; and responsibility for payment of non-covered services.     Patient would like the video invitation sent by:  My Chart    Mode of Communication:  Video Conference via Amwell    As the provider I attest to compliance with applicable laws and regulations related to telemedicine.       DATA:   Interactive Complexity: No   Crisis: No     Presenting Concerns/  Current Stressors:   Pt referred for bridging services due to extensive WL with [facility]. Initial referral and chart review indicate recent CD concerns (ETOH) with ongoing sobriety since early  correlated with his involvement in CD IOP. Pt declines need for review of informed consent and nature of / limitations to confidentiality due to familiarity from recent tx. He reports completing group programming as of today; would like to further explore the lingering influence of some previous maladaptive experiences, perspectives, and the delayed consequences to previous ETOH  abuse.      ASSESSMENT:  Mental Status Assessment:  Appearance:   Appropriate   Eye Contact:   Good   Psychomotor Behavior: Normal   Attitude:   Cooperative   Orientation:   All  Speech   Rate / Production: Normal/ Responsive   Volume:  Normal   Mood:    Anxious   Affect:    Appropriate   Thought Content:  Clear   Thought Form:  Coherent  Goal Directed  Logical   Insight:    Good       Safety Issues and Plan for Safety and Risk Management:     [place] Suicide Severity Rating Scale (Lifetime/Recent)  [place] Suicide Severity Rating (Lifetime/Recent) 12/20/2021   Wish to be Dead (Lifetime) No   Non-Specific Active Suicidal Thoughts (Lifetime) No   RETIRED: 1. Wish to be Dead (Recent) No   RETIRED: 2. Non-Specific Active Suicidal Thoughts (Recent) No   Has subject engaged in non-suicidal self-injurious behavior? (Lifetime) No   Has subject engaged in non-suicidal self-injurious behavior? (Past 3 Months) No     Patient denies current fears or concerns for personal safety.  Patient denies current or recent suicidal ideation or behaviors.  Patient denies current or recent homicidal ideation or behaviors.  Patient denies current or recent self injurious behavior or ideation.  Patient denies other safety concerns.  Recommended that patient call 911 or go to the local ED should there be a change in any of these risk factors.  Patient reports there are no firearms in the house.     Diagnostic Criteria:  Refer to previous evaluations via programmatic care      DSM5 Diagnoses: (Sustained by DSM5 Criteria Listed Above)  Diagnoses: F10.21 - Alcohol use disorder, severe, in sustained remission, dependence (H)      F39 - Unspecified mood [affective] d/o  Psychosocial & Contextual Factors: discharge from programmatic care, chronic CD hx, stage of life changes, relational strain  WHODAS 2.0 (12 item):   WHODAS 2.0 Total Score 12/16/2021 12/20/2021   Total Score 19 19   Total Score MyChart - 19     Intervention:   Mindfulness-  Patient was educated on relaxation and mindfulness techniques and Educated on treatment planning and started identifying goals and interventions for treatment plan  Collateral Reports Completed:  Not Applicable      PLAN: (Homework, other):  1. Provider will continue Diagnostic Assessment.  Patient was given the following to do until next session:  worldstephanie HW    2. Provider recommended the following referrals: n/a - in place prior to TC engagement.      3.  Suicide Risk and Safety Concerns were assessed for Albert Kumar.    Patient meets the following risk assessment and triage: Patient has no change in safety concerns. Committed to safety and agreed to follow previously developed safety plan.      LUISA URIOSTEGUI, [facility]  September 22, 2022

## 2022-09-23 ENCOUNTER — DOCUMENTATION ONLY (OUTPATIENT)
Dept: ADDICTION MEDICINE | Facility: HOSPITAL | Age: 37
End: 2022-09-23

## 2022-09-23 NOTE — PROGRESS NOTES
Substance Use Disorder Discharge Summary       Name:  Albert Kumar   :  RONALDO Ravi   Admit Date: 2022   Discharge Date: 2022   :  1985   Hours Completed: 96 hours   Initial Diagnosis:  303.90 (F10.20) Alcohol Use Disorder Severe   Final Diagnosis:  303.90 (F10.20) Alcohol Use Disorder Severe in early remission   Discharge Address:     [place] [id number]   Funding Source:    [facility]     Program:  Service Coordination/ Day Outpatient     Discharge Type:  WSA    Patient was receiving residential services at the time of discharge:   NO      Reasons for and circumstances of service termination:  Patient satisfied all treatment goals        If program discharge status was At Staff Request, the license cherry must identify the following:    Other interested parties conferred with: not applicable    Referrals provided: not applicable    Alternatives considered and attempted before deciding to discharge:  not applicable      Dimension/Course of Treatment/Individualized Care:   1.  Withdrawal Potential - Intake Risk level -  1,  Discharge Risk level -0, Narrative supporting risk description:  Date of last use of alcohol-1/3/2022 in the evening.  At intake, patient reported after few days of sobriety, he was experiencing jitterly hands, some anxiety, and more sweat. However, patient did not report need for medical detox and stated he was able to tolerate the symptoms.    Treatment plan goals and progress towards those goals:  Patient did not report any relapse or withdrawal issues during this outpatient treatment.  Pt reported his initial withdrawal symptoms subsided within 1 month of sobriety.  Pt was educated on Post Acute Withdrawal Symptoms (PAWS).  Pt reported with exercise, healthy eating and sobriety, he has not experienced any PAWS.      2.  Biomedical Conditions and Complications - Intake Risk level - 0,  Discharge Risk level - 0.    Narrative supporting  risk description:  Pt reported his overall medical and dental health were well at intake.  Pt had  [facility] and able to access health care.  Pt had an established primary care with Dr. Wally David at [facility]. Pt denied any tobacco use.    Treatment plan goals and progress towards those goals:  Patient continued to report good physical health during this outpatient treatment.   Pt reported he started jogging since starting outpatient treatment, and felt his physical health is in good shape.  Pt joined [employer] and took some exercise classes.  Pt registered for [place] [employer] marathon.  Pt frequently reprorted in group that his regular exercise helped him to be much healthier.  At discharge patient did not list any medical concern.  Pt was recommended to continue with regular doctor visits.        3.  Emotional/Behavioral/Cognitive Conditions and Complications - Intake Risk level -  1, Discharge Risk level - 1  Narrative supporting risk description:  At the time of intake, patient did not report past or cuurrent mental health diagnoses, however pt reported he experienced some some symptoms in the past and that his primary doctor was prescribing him with anxiety and depression medication.    Treatment plan goals and progress towards those goals:  Patient seemed he adjusted well to new recovery life style.  Pt stated this was his very first ELIJAH treatment and he was gaining valuable resources and information.  Soon after starting treatment, patient went back to his routine of regular exercise (jogging, golfing, gym, skiing in winter).  Pt also explored sobriety while travelling to different places.  Pt stated he was gaining self confidence in managing his recovery.   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  When he was educated on trauma and addiction,  "patient shared that he may have past unresolved issues and wanted to look for a psychotherapist.  Pt was referred to mental health practitioner to address this area.  Overall, patient made good progress in this treatment.  At discharge, patient was recommended to continue with regular AA meeting and mental health services.       4.  Readiness for Change - Intake Risk level - 0, Discharge Risk level - 0.  Narrative supporting risk description:  At intake patient seemed motivated for treatment.  Pt report blessing he was not coerced into treatment and that this was his voluntary decision.    Treatment plan goals and progress towards those goals:  Patient's treatment attendance was good.  Pt remained contact with counselors and informed when he could not attend.  It appears he successfully learned to manage ge his life, work and recovery.    Pt states he is feeling stronger with his recovery and treatment has been helpful. 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  It esteemed patient had a vision for his life and applied this skill in recovery maintenance as well.         5.  Relapse/Continued Use/Continued Problem Potential - Intake Risk level -  3 Discharge Risk level - 0  Narrative supporting risk description:  Patient missed intake once, and rescheduled it.  Pt was honest about his relapse on the day of his original intake.  Pt reported the longest sobriety in his lifetime as 5-6 months on his own.  He appeared to have had  little insight into his triggers at the time of intake, as evidenced by his statement. \"I can't put my fingers around it.\"    Treatment plan goals and progress towards those goals:  During this treatment, patient was given education on addiction and hot to manage it.  Pt was highly receptive to new information.  Pt was suggested to " start attending AA meeting and he found one he liked.  Pt was also educated on relapse issues and prevention plans.  Pt was able to identify that his addiction started at college.  Pt attributed some of his recovery success to having a supportive parents, girlfriends and siblings. At the end of this treatment, pt seemed he gained good understanding on his own addiction issues.    6.  Recovery Environment - Intake Risk level - 2, Discharge Risk level - 0  Narrative supporting risk description:  Patient reported his current incomes come from employment and investments. 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 Patient states he has college degree in "[employer]" and has been able to fulfill his occupational obligations.  He was liiving independently with his girlfriend and renting an apartment in [place]. Patient reports he kept his drinking to himself and none of his friends knew about the depth of his addiction or the fact that he was getting treatment.  Pt states his parents and girlfriends are supportive of his recovery. Patent did not report any past or current legal issues. Patient reports desire to become a professional golfer because he likes the lifestyle of traveling and seeing different places all over the world. Patient reports preparing himself for the [employer], coming up in October of this year.  Treatment plan goals and progress towards those goals:  Patient was educated on how addiction impacts family relationship.  Pt stated he was grateful for having a supportive family and girlfriend.  Pt was suggested to try AA.  Pt found one when he travelled to [place].  Pt returned from [place] trip and found a local meeting in [place].  Pt seemed he maintained his career well while attending treatment.  At discharge, he was recommended to maintain his regular AA meetings.       Strengths: good physical health,   Needs: none  Services Provided: Intake, assessment, treatment planning,  education, group discussion, film, lectures, 1x1 therapy, and recommendations at discharge.        Program Involvement: excellent  Attendance: good  Ability to relate in group/   Other program activities: excellent  Assignment Completion: good  Overall Behavior: excellent  Reported Family/Significant   Other Involvement: good    Prognosis: Good    Focus of Treatment / Discharge Recommendations    Personal Safety/ Management of Symptoms    * Follow your safety plan.  Report increased symptoms to your care team and /or go to the nearest Emergency Department.    * Call crisis lines as needed    [facility] [phone]                [facility] [phone]  [facility] [phone]              [facility] [phone]  [facility] [phone]              [facility] [phone]  [facility] [phone]          National Suicide Prevention [phone]  [facility] [phone]            Suicide Prevention [phone]  [facility] [phone]    Abstinence/Relapse Prevention  * Take all medicines as directed.  Carry a current list of medicines with you.  * Use coping skills: Meditation, AA meeting, exercise.    * Do not use illicit (street) drugs, controlled substances (narcotics) or alcohol.    Develop/Improve Independent Living/Socialization Skills: N/A    Community Resources/Supports and Discharge Planning:    Follow up with psychiatrist / main caregiver: Joel Daniel Irwin Wegener MD.    Next visit: TBD    Follow up with your therapist: Willie Babin [facility]   Next visit: 9/20/2022 at 4 pm     Go to group therapy and / or support groups at: Thursday night AA meeting at [facility]     See your medical doctor about:  Routine chieck    Other:  N/A    Counselor Name and Title:  RONALDO Ravi       Date:  9/23/2022  Time:  7:32 AM

## 2022-09-29 ENCOUNTER — VIRTUAL VISIT (OUTPATIENT)
Dept: BEHAVIORAL HEALTH | Facility: CLINIC | Age: 37
End: 2022-09-29

## 2022-09-29 DIAGNOSIS — F10.21 ALCOHOL USE DISORDER, SEVERE, IN SUSTAINED REMISSION, DEPENDENCE (H): ICD-10-CM

## 2022-09-29 DIAGNOSIS — F39 AFFECTIVE DISORDER (H): Primary | ICD-10-CM

## 2022-09-29 NOTE — PROGRESS NOTES
[facility]                                    Progress Note    Patient Name: Albert Kumar  Date: 09.29.2022         Service Type: Individual      Session Start Time: 1600  Session End Time: 1645     Session Length: 45m    Session #: 002    Attendees: Client attended alone    Service Modality:  Video Visit:      Provider verified identity through the following two step process.  Patient provided:  Patient is known previously to provider    Telemedicine Visit: The patient's condition can be safely assessed and treated via synchronous audio and visual telemedicine encounter.      Reason for Telemedicine Visit: Patient has requested telehealth visit    Originating Site (Patient Location): Patient's home    Distant Site (Provider Location): [facility]: Transition Clinic    Consent:  The patient/guardian has verbally consented to: the potential risks and benefits of telemedicine (video visit) versus in person care; bill my insurance or make self-payment for services provided; and responsibility for payment of non-covered services.     Patient would like the video invitation sent by:  Send to e-mail at: [email or web address]    Mode of Communication:  Video Conference via Amwell    As the provider I attest to compliance with applicable laws and regulations related to telemedicine.    DATA  Interactive Complexity: No  Crisis: No        Progress Since Last Session (Related to Symptoms / Goals / Homework):   Symptoms: Improving - Pt notes continued fx and improvements to overall insight as he has ceased IOP engagement    Homework: Achieved / completed to satisfaction      Episode of Care Goals: Achieved / completed to satisfaction - ACTION (Actively working towards change); Intervened by reinforcing change plan / affirming steps taken     Current / Ongoing Stressors and Concerns:   Pt referred for bridging services due to extensive WL with [facility]. Initial referral and  "chart review indicate recent CD concerns (ETOH) with ongoing sobriety since early 01.2022 correlated with his involvement in CD IOP.     Pt presents to session reporting some heightened baseline stress over the previous week correlated with confluence of professional tasks, which he anticipates will begin to \"cool off\" as these are a departure from normal. Describes overall confidence in maintained sobriety due to persistence of coping strategies learned during IOP. Processing of HW would suggest patterns of negativistic / limiting self-talk and comparative thinking; some dissonance between real vs perceived self. Session truncated due to unanticipated pt needs.      Treatment Objective(s) Addressed in This Session:   Identify negative self-talk and behaviors: challenge core beliefs, myths, and actions       Intervention:   Zeina: processed self/worldview hw       ASSESSMENT: Current Emotional / Mental Status (status of significant symptoms):   Risk status (Self / Other harm or suicidal ideation)   Patient denies current fears or concerns for personal safety.   Patient denies current or recent suicidal ideation or behaviors.   Patient denies current or recent homicidal ideation or behaviors.   Patient denies current or recent self injurious behavior or ideation.   Patient denies other safety concerns.   Patient reports there has been no change in risk factors since their last session.     Patient reports there has been no change in protective factors since their last session.     Recommended that patient call 911 or go to the local ED should there be a change in any of these risk factors.     Appearance:   Appropriate    Eye Contact:   Good    Psychomotor Behavior: Normal    Attitude:   Cooperative    Orientation:   All   Speech    Rate / Production: Normal     Volume:  Normal    Mood:    Normal   Affect:    Appropriate    Thought Content:  Clear    Thought Form:  Coherent  Logical    Insight:    Good "      Medication Review:   No changes to current psychiatric medication(s)     Medication Compliance:   Yes     Changes in Health Issues:   None reported     Chemical Use Review:   Substance Use: Chemical use reviewed, no active concerns identified      Tobacco Use: No current tobacco use.      Diagnosis:  1. F39 - Affective disorder (H)    2. F10.21 - Alcohol use disorder, severe, in sustained remission, dependence (H)        Collateral Reports Completed:   Not Applicable    PLAN: (Patient Tasks / Therapist Tasks / Other)  Resume tx planning / DA completion at next session.        LUISA URIOSTEGUI, [facility]  09.29.2022

## 2022-12-15 ENCOUNTER — OFFICE VISIT (OUTPATIENT)
Dept: FAMILY MEDICINE | Facility: CLINIC | Age: 37
End: 2022-12-15
Payer: COMMERCIAL

## 2022-12-15 VITALS
WEIGHT: 220 LBS | SYSTOLIC BLOOD PRESSURE: 128 MMHG | DIASTOLIC BLOOD PRESSURE: 81 MMHG | TEMPERATURE: 97.6 F | BODY MASS INDEX: 31.5 KG/M2 | HEART RATE: 66 BPM | RESPIRATION RATE: 18 BRPM | HEIGHT: 70 IN | OXYGEN SATURATION: 97 %

## 2022-12-15 DIAGNOSIS — M25.50 MULTIPLE JOINT PAIN: ICD-10-CM

## 2022-12-15 DIAGNOSIS — F51.01 PRIMARY INSOMNIA: ICD-10-CM

## 2022-12-15 DIAGNOSIS — J30.2 SEASONAL ALLERGIES: ICD-10-CM

## 2022-12-15 DIAGNOSIS — Z13.0 SCREENING, ANEMIA, DEFICIENCY, IRON: ICD-10-CM

## 2022-12-15 DIAGNOSIS — F33.2 SEVERE EPISODE OF RECURRENT MAJOR DEPRESSIVE DISORDER, WITHOUT PSYCHOTIC FEATURES (H): ICD-10-CM

## 2022-12-15 DIAGNOSIS — Z13.6 CARDIOVASCULAR SCREENING; LDL GOAL LESS THAN 160: ICD-10-CM

## 2022-12-15 DIAGNOSIS — Z00.00 ROUTINE GENERAL MEDICAL EXAMINATION AT A HEALTH CARE FACILITY: Primary | ICD-10-CM

## 2022-12-15 DIAGNOSIS — Z13.1 SCREENING FOR DIABETES MELLITUS: ICD-10-CM

## 2022-12-15 PROBLEM — F10.20 ALCOHOL USE DISORDER, MODERATE, DEPENDENCE (H): Status: ACTIVE | Noted: 2020-11-04

## 2022-12-15 PROCEDURE — 99395 PREV VISIT EST AGE 18-39: CPT | Performed by: PHYSICIAN ASSISTANT

## 2022-12-15 RX ORDER — FLUTICASONE PROPIONATE 50 MCG
2 SPRAY, SUSPENSION (ML) NASAL DAILY
Qty: 16 G | Refills: 11 | Status: SHIPPED | OUTPATIENT
Start: 2022-12-15

## 2022-12-15 RX ORDER — BUPROPION HYDROCHLORIDE 150 MG/1
150 TABLET ORAL EVERY MORNING
Qty: 90 TABLET | Refills: 1 | Status: SHIPPED | OUTPATIENT
Start: 2022-12-15 | End: 2023-07-03

## 2022-12-15 RX ORDER — BUPROPION HYDROCHLORIDE 75 MG/1
TABLET ORAL
Qty: 180 TABLET | Refills: 1 | Status: SHIPPED | OUTPATIENT
Start: 2022-12-15 | End: 2022-12-15 | Stop reason: ALTCHOICE

## 2022-12-15 ASSESSMENT — ENCOUNTER SYMPTOMS
PARESTHESIAS: 1
PALPITATIONS: 0
HEADACHES: 0
HEMATURIA: 0
MYALGIAS: 1
NERVOUS/ANXIOUS: 0
HEARTBURN: 0
DIZZINESS: 0
CHILLS: 0
SHORTNESS OF BREATH: 0
ARTHRALGIAS: 1
JOINT SWELLING: 0
DYSURIA: 0
ABDOMINAL PAIN: 0
COUGH: 0
DIARRHEA: 0
SORE THROAT: 0
HEMATOCHEZIA: 0
NAUSEA: 0
FEVER: 0
FREQUENCY: 0
EYE PAIN: 0
CONSTIPATION: 0
WEAKNESS: 0

## 2022-12-15 ASSESSMENT — PATIENT HEALTH QUESTIONNAIRE - PHQ9
SUM OF ALL RESPONSES TO PHQ QUESTIONS 1-9: 3
10. IF YOU CHECKED OFF ANY PROBLEMS, HOW DIFFICULT HAVE THESE PROBLEMS MADE IT FOR YOU TO DO YOUR WORK, TAKE CARE OF THINGS AT HOME, OR GET ALONG WITH OTHER PEOPLE: NOT DIFFICULT AT ALL
SUM OF ALL RESPONSES TO PHQ QUESTIONS 1-9: 3

## 2022-12-15 NOTE — PROGRESS NOTES
SUBJECTIVE:   CC: Albert is an 37 year old who presents for preventative health visit.   Patient has been advised of split billing requirements and indicates understanding: Yes     Healthy Habits:     Getting at least 3 servings of Calcium per day:  Yes    Bi-annual eye exam:  NO    Dental care twice a year:  Yes    Sleep apnea or symptoms of sleep apnea:  Excessive snoring    Diet:  Regular (no restrictions)    Frequency of exercise:  4-5 days/week    Duration of exercise:  45-60 minutes    PHQ-2 Total Score: 0    Additional concerns today:  No    Patient notes bilateral hands and forearm/albow pain/swelling off/on for the past few months; easily could be overuse but not sure and no specific trauma/pinpoint area of pain.    Patient taking wellbutrin two times a day with overall good results but notes some issues with sleep lately.        Today's PHQ-2 Score:   PHQ-2 ( 1999 Pf[employer]) 12/15/2022   Q1: Little interest or pleasure in doing things 0   Q2: Feeling down, depressed or hopeless 0   PHQ-2 Score 0   PHQ-2 Total Score (12-17 Years)- Positive if 3 or more points; Administer PHQ-A if positive -   Q1: Little interest or pleasure in doing things Not at all   Q2: Feeling down, depressed or hopeless Not at all   PHQ-2 Score 0   Some encounter information is confidential and restricted. Go to Review Flowsheets activity to see all data.           Social History     Tobacco Use     Smoking status: Some Days     Types: Cigars     Smokeless tobacco: Never   Substance Use Topics     Alcohol use: Yes     Comment: 25-30 drinks in a day every other day         Alcohol Use 12/15/2022   Prescreen: >3 drinks/day or >7 drinks/week? Yes   Prescreen: >3 drinks/day or >7 drinks/week? -   AUDIT SCORE  8     AUDIT - Alcohol Use Disorders Identification Test - Reproduced from the World Health Organization Audit 2001 (Second Edition) 12/15/2022   1.  How often do you have a drink containing alcohol? 2 to 3 times a week   2.  How many  drinks containing alcohol do you have on a typical day when you are drinking? 3 or 4   3.  How often do you have five or more drinks on one occasion? Less than monthly   4.  How often during the last year have you found that you were not able to stop drinking once you had started? Never   5.  How often during the last year have you failed to do what was normally expected of you because of drinking? Never   6.  How often during the last year have you needed a first drink in the morning to get yourself going after a heavy drinking session? Never   7.  How often during the last year have you had a feeling of guilt or remorse after drinking? Less than monthly   8.  How often during the last year have you been unable to remember what happened the night before because of your drinking? Never   9.  Have you or someone else been injured because of your drinking? No   10. Has a relative, friend, doctor or other health care worker been concerned about your drinking or suggested you cut down? Yes, but not in the last year   TOTAL SCORE 8       Last PSA: No results found for: PSA    Reviewed orders with patient. Reviewed health maintenance and updated orders accordingly - Yes  Labs reviewed in EPIC  BP Readings from Last 3 Encounters:   12/15/22 128/81   06/14/21 133/85    Wt Readings from Last 3 Encounters:   12/15/22 99.8 kg (220 lb)   06/14/21 95.5 kg (210 lb 9.6 oz)                  Patient Active Problem List   Diagnosis     Alcohol use disorder, moderate, dependence (H)     History reviewed. No pertinent surgical history.    Social History     Tobacco Use     Smoking status: Some Days     Types: Cigars     Smokeless tobacco: Never   Substance Use Topics     Alcohol use: Yes     Comment: 25-30 drinks in a day every other day     Family History   Problem Relation Age of Onset     Substance Abuse Sister      Depression Sister      Anxiety Disorder Sister          Current Outpatient Medications   Medication Sig Dispense  "Refill     buPROPion (WELLBUTRIN XL) 150 MG 24 hr tablet Take 1 tablet (150 mg) by mouth every morning 90 tablet 1     fluticasone (FLONASE) 50 MCG/ACT nasal spray Spray 2 sprays into both nostrils daily 16 g 11     Allergies   Allergen Reactions     Seasonal Allergies      Recent Labs   Lab Test 06/14/21  1036   *   HDL 61   TRIG 127        Reviewed and updated as needed this visit by clinical staff   Tobacco  Allergies  Meds  Problems  Med Hx  Surg Hx  Fam Hx          Reviewed and updated as needed this visit by Provider   Tobacco  Allergies  Meds  Problems  Med Hx  Surg Hx  Fam Hx           Past Medical History:   Diagnosis Date     Seasonal allergies       History reviewed. No pertinent surgical history.    Review of Systems   Constitutional: Negative for chills and fever.   HENT: Positive for congestion. Negative for ear pain, hearing loss and sore throat.    Eyes: Negative for pain and visual disturbance.   Respiratory: Negative for cough and shortness of breath.    Cardiovascular: Negative for chest pain, palpitations and peripheral edema.   Gastrointestinal: Negative for abdominal pain, constipation, diarrhea, heartburn, hematochezia and nausea.   Genitourinary: Negative for dysuria, frequency, genital sores, hematuria, impotence, penile discharge and urgency.   Musculoskeletal: Positive for arthralgias and myalgias. Negative for joint swelling.   Skin: Negative for rash.   Neurological: Positive for paresthesias. Negative for dizziness, weakness and headaches.   Psychiatric/Behavioral: Negative for mood changes. The patient is not nervous/anxious.        OBJECTIVE:   /81 (BP Location: Right arm, Patient Position: Sitting, Cuff Size: Adult Large)   Pulse 66   Temp 97.6  F (36.4  C) (Oral)   Resp 18   Ht 1.782 m (5' 10.15\")   Wt 99.8 kg (220 lb)   SpO2 97%   BMI 31.43 kg/m      Physical Exam  GENERAL: healthy, alert and no distress  EYES: Eyes grossly normal to inspection, " PERRL and conjunctivae and sclerae normal  HENT: ear canals and TM's normal, nose and mouth without ulcers or lesions  NECK: no adenopathy, no asymmetry, masses, or scars and thyroid normal to palpation  RESP: lungs clear to auscultation - no rales, rhonchi or wheezes  CV: regular rate and rhythm, normal S1 S2, no S3 or S4, no murmur, click or rub, no peripheral edema and peripheral pulses strong  ABDOMEN: soft, nontender, no hepatosplenomegaly, no masses and bowel sounds normal  MS: no gross musculoskeletal defects noted, no edema  SKIN: no suspicious lesions or rashes  NEURO: Normal strength and tone, mentation intact and speech normal  PSYCH: mentation appears normal, affect normal/bright    Diagnostic Test Results:  Labs reviewed in Epic    ASSESSMENT/PLAN:       ICD-10-CM    1. Routine general medical examination at a health care facility  Z00.00       2. Severe episode of recurrent major depressive disorder, without psychotic features (H)  F33.2 buPROPion (WELLBUTRIN XL) 150 MG 24 hr tablet     DISCONTINUED: buPROPion (WELLBUTRIN) 75 MG tablet      3. Primary insomnia  F51.01 Adult Sleep Eval & Management  Referral      4. Seasonal allergies  J30.2 fluticasone (FLONASE) 50 MCG/ACT nasal spray      5. Multiple joint pain  M25.50 ESR: Erythrocyte sedimentation rate     CRP, inflammation     Rheumatoid factor      6. Screening for diabetes mellitus  Z13.1 Comprehensive metabolic panel      7. Screening, anemia, deficiency, iron  Z13.0 Hemoglobin      8. CARDIOVASCULAR SCREENING; LDL GOAL LESS THAN 160  Z13.6 Lipid panel reflex to direct LDL Fasting          Patient has been advised of split billing requirements and indicates understanding: Yes      COUNSELING:   Reviewed preventive health counseling, as reflected in patient instructions       Regular exercise       Healthy diet/nutrition      BMI:   Estimated body mass index is 31.43 kg/m  as calculated from the following:    Height as of this encounter:  "1.782 m (5' 10.15\").    Weight as of this encounter: 99.8 kg (220 lb).         He reports that he has been smoking cigars. He has never used smokeless tobacco.  Nicotine/Tobacco Cessation Plan:   Information offered: Patient not interested at this time            Lorie Collazo PA-C  [facility] [place]  Answers for HPI/ROS submitted by the patient on 12/15/2022  If you checked off any problems, how difficult have these problems made it for you to do your work, take care of things at home, or get along with other people?: Not difficult at all  PHQ9 TOTAL SCORE: 3      "

## 2023-01-02 ENCOUNTER — LAB (OUTPATIENT)
Dept: LAB | Facility: CLINIC | Age: 38
End: 2023-01-02
Payer: COMMERCIAL

## 2023-01-02 DIAGNOSIS — M25.50 MULTIPLE JOINT PAIN: ICD-10-CM

## 2023-01-02 DIAGNOSIS — Z13.0 SCREENING, ANEMIA, DEFICIENCY, IRON: ICD-10-CM

## 2023-01-02 DIAGNOSIS — Z13.1 SCREENING FOR DIABETES MELLITUS: ICD-10-CM

## 2023-01-02 DIAGNOSIS — Z13.6 CARDIOVASCULAR SCREENING; LDL GOAL LESS THAN 160: ICD-10-CM

## 2023-01-02 LAB
ALBUMIN SERPL BCG-MCNC: 5.1 G/DL (ref 3.5–5.2)
ALP SERPL-CCNC: 45 U/L (ref 40–129)
ALT SERPL W P-5'-P-CCNC: 48 U/L (ref 10–50)
ANION GAP SERPL CALCULATED.3IONS-SCNC: 15 MMOL/L (ref 7–15)
AST SERPL W P-5'-P-CCNC: 47 U/L (ref 10–50)
BILIRUB SERPL-MCNC: 0.5 MG/DL
BUN SERPL-MCNC: 17.3 MG/DL (ref 6–20)
CALCIUM SERPL-MCNC: 10 MG/DL (ref 8.6–10)
CHLORIDE SERPL-SCNC: 102 MMOL/L (ref 98–107)
CHOLEST SERPL-MCNC: 299 MG/DL
CREAT SERPL-MCNC: 1.07 MG/DL (ref 0.67–1.17)
CRP SERPL-MCNC: <3 MG/L
DEPRECATED HCO3 PLAS-SCNC: 21 MMOL/L (ref 22–29)
ERYTHROCYTE [SEDIMENTATION RATE] IN BLOOD BY WESTERGREN METHOD: 3 MM/HR (ref 0–15)
GFR SERPL CREATININE-BSD FRML MDRD: >90 ML/MIN/1.73M2
GLUCOSE SERPL-MCNC: 84 MG/DL (ref 70–99)
HDLC SERPL-MCNC: 49 MG/DL
HGB BLD-MCNC: 15 G/DL (ref 13.3–17.7)
LDLC SERPL CALC-MCNC: 205 MG/DL
NONHDLC SERPL-MCNC: 250 MG/DL
POTASSIUM SERPL-SCNC: 4.1 MMOL/L (ref 3.4–5.3)
PROT SERPL-MCNC: 7.6 G/DL (ref 6.4–8.3)
SODIUM SERPL-SCNC: 138 MMOL/L (ref 136–145)
TRIGL SERPL-MCNC: 223 MG/DL

## 2023-01-02 PROCEDURE — 86431 RHEUMATOID FACTOR QUANT: CPT

## 2023-01-02 PROCEDURE — 85018 HEMOGLOBIN: CPT

## 2023-01-02 PROCEDURE — 85652 RBC SED RATE AUTOMATED: CPT

## 2023-01-02 PROCEDURE — 36415 COLL VENOUS BLD VENIPUNCTURE: CPT

## 2023-01-02 PROCEDURE — 80061 LIPID PANEL: CPT

## 2023-01-02 PROCEDURE — 86140 C-REACTIVE PROTEIN: CPT

## 2023-01-02 PROCEDURE — 80053 COMPREHEN METABOLIC PANEL: CPT

## 2023-01-03 LAB — RHEUMATOID FACT SER NEPH-ACNC: 8 IU/ML

## 2023-01-03 NOTE — RESULT ENCOUNTER NOTE
"Adrien Price  Your attached labs are overall within normal limits, but your cholesterol is elevated.  Desired or goal levels are:  CHOLESTEROL: Desirable is less than 200.  LDL cholesterol is the \"bad cholesterol\"     -goal less than 160 if you are low risk    -less than 130 if you have heart disease risk factors     -less than 100 if you have diabetes.     -less than 70 if you have heart disease.   HDL or  \"good cholesterol\"     - greater than 40 for men or 50 for women.  HDL is generally not specifically treated with medication.   Triglycerides are the \"fat\" in the blood     -goal less than 150.  Triglycerides are generally not specifically treated with medication, but can improve with diet and exercise    Your LDL cholesterol is sometimes called \"bad cholesterol\" because it contributes to heart disease and stroke. This can be high due to both genetics (which you can't change) and diet (which you may be able to).  If you aren't already, I recommend increasing whole grains with every meal, and adding flax seed to your food.       One easy way to do this is to eat oatmeal every morning.  Steel cut oats take longer to cook but are better for you than instant, but any oatmeal at all improves your cholesterol.    You can mix or add ground flax seed to hot cereals, yogurt, applesauce, cottage cheese or any sauce mixture or baked good. Ground flax seed can be found in the baking aisle near the flour. The recommended dose of flax seen is 2 heaping tablespoonfuls daily, you may want to start with 1 tablespoonful and increase to 2 heaping tablespoonfuls.     You can also take Omega-3 fatty acids (available in capsules) to improve your overall levels, especially your triglycerides. You need 2000 - 4000 mg daily of EPA + DHA. This usually means 4 - 8 capsules a day, depending on the strength you buy or you can try taking Red Yeast Rice (an herbal supplement that contains a natural statin) start with 600 mg once daily with " "food and increase to 1200 mg twice daily as tolerated.     Here are some ways to improve your nutrition (adapted from the American Academy of [employer] handout):  Eat less fat (especially butter, Crisco and other saturated fats)  Buy lean cuts of meat; reduce your portions of red meat or substitute poultry or fish  Use skim milk and low-fat dairy products  Eat no more than 4 egg yolks per week  Avoid fried or fast foods that are high in fat  Eat more fruits and vegetables    Also consider starting or increasing your aerobic activity; this is the best way to improve HDL (good) cholesterol. If aerobic activity would be new to you, please talk with me first about what activities are safe for you.    If you are able to make changes, I recommend rechecking your fasting lipids in about 6 months to see if your cholesterol has improved.       Please contact the office with any questions or concerns.    Lorie Draper \"Otf\" TAWANA Collazo    "

## 2023-02-17 DIAGNOSIS — F33.2 SEVERE EPISODE OF RECURRENT MAJOR DEPRESSIVE DISORDER, WITHOUT PSYCHOTIC FEATURES (H): ICD-10-CM

## 2023-02-20 RX ORDER — BUPROPION HYDROCHLORIDE 75 MG/1
TABLET ORAL
Qty: 180 TABLET | Refills: 1 | OUTPATIENT
Start: 2023-02-20

## 2023-06-28 DIAGNOSIS — F33.2 SEVERE EPISODE OF RECURRENT MAJOR DEPRESSIVE DISORDER, WITHOUT PSYCHOTIC FEATURES (H): ICD-10-CM

## 2023-07-03 RX ORDER — BUPROPION HYDROCHLORIDE 150 MG/1
TABLET ORAL
Qty: 90 TABLET | Refills: 1 | Status: SHIPPED | OUTPATIENT
Start: 2023-07-03

## 2023-07-03 NOTE — TELEPHONE ENCOUNTER
Routing refill request to provider for review/approval because:  Patient due for follow-up.  No response to outreach.  Request one week old.  Please advise.  Estella CAMPO RN

## 2023-07-03 NOTE — TELEPHONE ENCOUNTER
1/31/2022     4:35 PM 12/15/2022     3:19 PM   PHQ   PHQ-9 Total Score 23 3   Q9: Thoughts of better off dead/self-harm past 2 weeks Several days Not at all     Refilled.

## 2023-11-15 ENCOUNTER — PATIENT OUTREACH (OUTPATIENT)
Dept: CARE COORDINATION | Facility: CLINIC | Age: 38
End: 2023-11-15
Payer: COMMERCIAL

## 2023-11-29 ENCOUNTER — PATIENT OUTREACH (OUTPATIENT)
Dept: CARE COORDINATION | Facility: CLINIC | Age: 38
End: 2023-11-29
Payer: COMMERCIAL

## 2023-12-07 NOTE — GROUP NOTE
Video Visit: Group Session via Zoom      Provider verified identity through the following two step process.  Patient provided:  Patient photo and Patient is known previously to provider    Telemedicine Visit: The patient's condition can be safely assessed and treated via synchronous audio and visual telemedicine encounter.      Reason for Telemedicine Visit: Patient has requested telehealth visit    Originating Site (Patient Location): Patient's home    Distant Site (Provider Location): [facility]: [facility]    Consent:  The patient/guardian has verbally consented to: the potential risks and benefits of telemedicine (video visit) versus in person care; bill my insurance or make self-payment for services provided; and responsibility for payment of non-covered services.     Patient would like the video invitation sent by:  Send to e-mail at: [email or web address]    Mode of Communication:  Video Conference via Medical Zoom    As the provider I attest to compliance with applicable laws and regulations related to telemedicine.Group Therapy Documentation    PATIENT'S NAME: Albert Kumar  MRN:   [id number]  :   1985  ACCT. NUMBER: [id number]  DATE OF SERVICE: 22  START TIME: 12:30 PM  END TIME:  3:30 PM  FACILITATOR(S): Nirav Castro LADC; Jacky Nash LADC  TOPIC: BEH Group Therapy  Number of patients attending the group:  3  Group Length:  3 Hours    Group Therapy Type: Psychoeducation    Summary of Group / Topics Discussed:    Good-By letter and Guided Meditation      Group Attendance:  Attended group session    Patient's response to the group topic/interactions:  cooperative with task, discussed personal experience with topic, expressed readiness to alter behaviors, expressed understanding of topic, gave appropriate feedback to peers, listened actively and offered helpful suggestions to peers    Patient appeared to be Actively participating, Attentive and Engaged.     "    Client specific details:  Patient reports that he has quit his part-time  job to avoid being close to alcohol, and denies any up coming medical, dental, or vision appointment. He reports exercising daily and staying busy with work, and expressed gratitude for having this treatment group as a platform to talk about his recovery. Patient reports negative consequences of addiction as, \"mising out on little things,\" and described himself as engaging in \"white lie.\" Patient reports he is grateful for this group, and said he is just taking things day-by-day.      " 98.1

## 2024-01-28 ENCOUNTER — HEALTH MAINTENANCE LETTER (OUTPATIENT)
Age: 39
End: 2024-01-28

## 2024-01-29 ENCOUNTER — OFFICE VISIT (OUTPATIENT)
Dept: PEDIATRICS | Facility: CLINIC | Age: 39
End: 2024-01-29
Payer: COMMERCIAL

## 2024-01-29 VITALS
TEMPERATURE: 97.4 F | WEIGHT: 223.9 LBS | DIASTOLIC BLOOD PRESSURE: 90 MMHG | OXYGEN SATURATION: 98 % | SYSTOLIC BLOOD PRESSURE: 128 MMHG | BODY MASS INDEX: 31.35 KG/M2 | HEART RATE: 91 BPM | RESPIRATION RATE: 16 BRPM | HEIGHT: 71 IN

## 2024-01-29 DIAGNOSIS — Z82.49 FAMILY HISTORY OF ISCHEMIC HEART DISEASE: ICD-10-CM

## 2024-01-29 DIAGNOSIS — E78.5 DYSLIPIDEMIA: ICD-10-CM

## 2024-01-29 DIAGNOSIS — Z00.00 ROUTINE GENERAL MEDICAL EXAMINATION AT A HEALTH CARE FACILITY: Primary | ICD-10-CM

## 2024-01-29 PROBLEM — F10.20 ALCOHOL USE DISORDER, MODERATE, DEPENDENCE (H): Status: RESOLVED | Noted: 2020-11-04 | Resolved: 2024-01-29

## 2024-01-29 LAB
HBA1C MFR BLD: 5.2 % (ref 0–5.6)
T PALLIDUM AB SER QL: NONREACTIVE

## 2024-01-29 PROCEDURE — 87389 HIV-1 AG W/HIV-1&-2 AB AG IA: CPT | Performed by: STUDENT IN AN ORGANIZED HEALTH CARE EDUCATION/TRAINING PROGRAM

## 2024-01-29 PROCEDURE — 36415 COLL VENOUS BLD VENIPUNCTURE: CPT | Performed by: STUDENT IN AN ORGANIZED HEALTH CARE EDUCATION/TRAINING PROGRAM

## 2024-01-29 PROCEDURE — 83036 HEMOGLOBIN GLYCOSYLATED A1C: CPT | Performed by: STUDENT IN AN ORGANIZED HEALTH CARE EDUCATION/TRAINING PROGRAM

## 2024-01-29 PROCEDURE — 86780 TREPONEMA PALLIDUM: CPT | Performed by: STUDENT IN AN ORGANIZED HEALTH CARE EDUCATION/TRAINING PROGRAM

## 2024-01-29 PROCEDURE — 84443 ASSAY THYROID STIM HORMONE: CPT | Performed by: STUDENT IN AN ORGANIZED HEALTH CARE EDUCATION/TRAINING PROGRAM

## 2024-01-29 PROCEDURE — 87591 N.GONORRHOEAE DNA AMP PROB: CPT | Performed by: STUDENT IN AN ORGANIZED HEALTH CARE EDUCATION/TRAINING PROGRAM

## 2024-01-29 PROCEDURE — 99396 PREV VISIT EST AGE 40-64: CPT | Performed by: STUDENT IN AN ORGANIZED HEALTH CARE EDUCATION/TRAINING PROGRAM

## 2024-01-29 PROCEDURE — 80053 COMPREHEN METABOLIC PANEL: CPT | Performed by: STUDENT IN AN ORGANIZED HEALTH CARE EDUCATION/TRAINING PROGRAM

## 2024-01-29 PROCEDURE — 80061 LIPID PANEL: CPT | Performed by: STUDENT IN AN ORGANIZED HEALTH CARE EDUCATION/TRAINING PROGRAM

## 2024-01-29 PROCEDURE — 87491 CHLMYD TRACH DNA AMP PROBE: CPT | Performed by: STUDENT IN AN ORGANIZED HEALTH CARE EDUCATION/TRAINING PROGRAM

## 2024-01-29 ASSESSMENT — ENCOUNTER SYMPTOMS
NERVOUS/ANXIOUS: 1
NAUSEA: 0
PALPITATIONS: 0
DIARRHEA: 0
ARTHRALGIAS: 0
HEARTBURN: 0
JOINT SWELLING: 0
DYSURIA: 0
HEMATOCHEZIA: 0
MYALGIAS: 0
FEVER: 0
CHILLS: 0
EYE PAIN: 0
HEADACHES: 0
WEAKNESS: 0
FREQUENCY: 0
COUGH: 0
HEMATURIA: 0
PARESTHESIAS: 0
CONSTIPATION: 0
DIZZINESS: 0
SORE THROAT: 0
SHORTNESS OF BREATH: 0
ABDOMINAL PAIN: 0

## 2024-01-29 ASSESSMENT — PATIENT HEALTH QUESTIONNAIRE - PHQ9
SUM OF ALL RESPONSES TO PHQ QUESTIONS 1-9: 5
10. IF YOU CHECKED OFF ANY PROBLEMS, HOW DIFFICULT HAVE THESE PROBLEMS MADE IT FOR YOU TO DO YOUR WORK, TAKE CARE OF THINGS AT HOME, OR GET ALONG WITH OTHER PEOPLE: NOT DIFFICULT AT ALL
SUM OF ALL RESPONSES TO PHQ QUESTIONS 1-9: 5

## 2024-01-29 ASSESSMENT — PAIN SCALES - GENERAL: PAINLEVEL: NO PAIN (0)

## 2024-01-29 NOTE — PATIENT INSTRUCTIONS
CT Coronary calcium scan  -no IV contrast  -CPT code 41430       Preventive Health Recommendations  Male Ages 40 to 49    Yearly exam:             See your health care provider every year in order to  o   Review health changes.   o   Discuss preventive care.    o   Review your medicines if your doctor has prescribed any.  You should be tested each year for STDs (sexually transmitted diseases) if you re at risk.   Have a cholesterol test every 5 years.   Have a colonoscopy (test for colon cancer) beginning at age 45.  Ask your provider about other options like a yearly FIT test or Cologuard test every 3 years (stool tests)   After age 45, have a diabetes test (fasting glucose). If you are at risk for diabetes, you should have this test every 3 years.    Talk with your health care provider about whether or not a prostate cancer screening test (PSA) is right for you.    Shots: Get a flu shot each year. Get a tetanus shot every 10 years.     Nutrition:  Eat at least 5 servings of fruits and vegetables daily.   Eat whole-grain bread, whole-wheat pasta and brown rice instead of white grains and rice.   Get adequate Calcium and Vitamin D.     Lifestyle  Exercise for at least 150 minutes a week (30 minutes a day, 5 days a week). This will help you control your weight and prevent disease.   Limit alcohol to one drink per day.   No smoking.   Wear sunscreen to prevent skin cancer.   See your dentist every six months for an exam and cleaning.

## 2024-01-29 NOTE — LETTER
"February 5, 2024      Albert Kumar  [address] [address]        Dear Albert,     Here are your recent lab results:     Your trigylcerides are elevated. Eating more good fats such as those in olive oil, canola oil, flaxseed and fish can help lower this as well as decreasing carbohydrate intake such as processed sugars, non-whole wheat breads/pasta/rice/cereals, alcohol, sweets.      Additionally the cholesterol levels, specifically the LDL or \"bad\" cholesterol, is elevated. I recommend getting the CT coronary calcium scan that we discussed.   I placed the order for this. You should be called to schedule, or call Radiology scheduling phone number: [phone] for [facility] in [place].      Your metabolic panel (kidney function, electrolytes) was normal except the liver enzymes are slightly elevated. This is most consistent with fatty liver disease. Working on exercise and nutrition and reducing alcohol intake would be beneficial. We should recheck them next year.      Your thyroid function was normal.      Your STI (sexually transmitted infection) testing was normal.      Your hemoglobin a1c (screens for diabetes) was normal.     Please let us know if you have questions or concerns.      Christine Draper MD   Internal Medicine & Pediatrics   [facility]       Resulted Orders   CHLAMYDIA TRACHOMATIS PCR   Result Value Ref Range    Chlamydia trachomatis Negative Negative      Comment:      A negative result by transcription mediated amplification does not preclude the presence of C. trachomatis infection because results are dependent on proper and adequate collection, absence of inhibitors and sufficient rRNA to be detected.   Lipid panel reflex to direct LDL Non-fasting   Result Value Ref Range    Cholesterol 292 (H) <200 mg/dL    Triglycerides 371 (H) <150 mg/dL    Direct Measure HDL 60 >=40 mg/dL    LDL Cholesterol Calculated 158 (H) <=100 mg/dL "    Non HDL Cholesterol 232 (H) <130 mg/dL    Patient Fasting > 8hrs? Yes     Narrative    Cholesterol  Desirable:  <200 mg/dL    Triglycerides  Normal:  Less than 150 mg/dL  Borderline High:  150-199 mg/dL  High:  200-499 mg/dL  Very High:  Greater than or equal to 500 mg/dL    Direct Measure HDL  Female:  Greater than or equal to 50 mg/dL   Male:  Greater than or equal to 40 mg/dL    LDL Cholesterol  Desirable:  <100mg/dL  Above Desirable:  100-129 mg/dL   Borderline High:  130-159 mg/dL   High:  160-189 mg/dL   Very High:  >= 190 mg/dL    Non HDL Cholesterol  Desirable:  130 mg/dL  Above Desirable:  130-159 mg/dL  Borderline High:  160-189 mg/dL  High:  190-219 mg/dL  Very High:  Greater than or equal to 220 mg/dL   Hemoglobin A1c   Result Value Ref Range    Hemoglobin A1C 5.2 0.0 - 5.6 %      Comment:      Normal <5.7%   Prediabetes 5.7-6.4%    Diabetes 6.5% or higher     Note: Adopted from ADA consensus guidelines.   Comprehensive metabolic panel (BMP + Alb, Alk Phos, ALT, AST, Total. Bili, TP)   Result Value Ref Range    Sodium 138 135 - 145 mmol/L      Comment:      Reference intervals for this test were updated on 09/26/2023 to more accurately reflect our healthy population. There may be differences in the flagging of prior results with similar values performed with this method. Interpretation of those prior results can be made in the context of the updated reference intervals.     Potassium 4.2 3.4 - 5.3 mmol/L    Carbon Dioxide (CO2) 25 22 - 29 mmol/L    Anion Gap 15 7 - 15 mmol/L    Urea Nitrogen 9.9 6.0 - 20.0 mg/dL    Creatinine 0.87 0.67 - 1.17 mg/dL    GFR Estimate >90 >60 mL/min/1.73m2    Calcium 10.0 8.6 - 10.0 mg/dL    Chloride 98 98 - 107 mmol/L    Glucose 85 70 - 99 mg/dL    Alkaline Phosphatase 48 40 - 150 U/L      Comment:      Reference intervals for this test were updated on 11/14/2023 to more accurately reflect our healthy population. There may be differences in the flagging of prior results  with similar values performed with this method. Interpretation of those prior results can be made in the context of the updated reference intervals.    AST 68 (H) 0 - 45 U/L      Comment:      Reference intervals for this test were updated on 6/12/2023 to more accurately reflect our healthy population. There may be differences in the flagging of prior results with similar values performed with this method. Interpretation of those prior results can be made in the context of the updated reference intervals.    ALT 88 (H) 0 - 70 U/L      Comment:      Reference intervals for this test were updated on 6/12/2023 to more accurately reflect our healthy population. There may be differences in the flagging of prior results with similar values performed with this method. Interpretation of those prior results can be made in the context of the updated reference intervals.      Protein Total 8.0 6.4 - 8.3 g/dL    Albumin 5.1 3.5 - 5.2 g/dL    Bilirubin Total 0.4 <=1.2 mg/dL   NEISSERIA GONORRHOEA PCR   Result Value Ref Range    Neisseria gonorrhoeae Negative Negative      Comment:      Negative for N. gonorrhoeae rRNA by transcription mediated amplification. A negative result by transcription mediated amplification does not preclude the presence of C. trachomatis infection because results are dependent on proper and adequate collection, absence of inhibitors and sufficient rRNA to be detected.   Treponema Abs w Reflex to RPR and Titer   Result Value Ref Range    Treponema Antibody Total Nonreactive Nonreactive   HIV Antigen Antibody Combo   Result Value Ref Range    HIV Antigen Antibody Combo Nonreactive Nonreactive      Comment:      Negative HIV-1/-2 antigen and antibody screening test results usually indicate the absence of HIV-1 and HIV-2 infection. However, such negative results do not rule-out acute HIV infection.  If acute HIV-1 or HIV-2 infection is suspected, detection of HIV-1 or HIV-2 RNA  is recommended.    TSH with  free T4 reflex   Result Value Ref Range    TSH 2.15 0.30 - 4.20 uIU/mL

## 2024-01-29 NOTE — PROGRESS NOTES
"Preventive Care Visit  [facility] YOHANA Clifton MD, Internal Medicine - Pediatrics  Jan 29, 2024      SUBJECTIVE:   Albert is a 39 year old, presenting for the following:  Physical        1/29/2024     1:27 PM   Additional Questions   Roomed by [facility]   Accompanied by self         1/29/2024     1:27 PM   Patient Reported Additional Medications   Patient reports taking the following new medications none     Healthy Habits:     Getting at least 3 servings of Calcium per day:  NO    Bi-annual eye exam:  NO    Dental care twice a year:  Yes    Sleep apnea or symptoms of sleep apnea:  Daytime drowsiness and Excessive snoring    Diet:  Regular (no restrictions)    Frequency of exercise:  1 day/week    Duration of exercise:  N/A    Taking medications regularly:  Yes    Medication side effects:  None    Additional concerns today:  Yes    History skin cancer on father's side  Likes to ski, sail, walk, run      Today's PHQ-9 Score:       1/29/2024     1:11 PM   PHQ-9 SCORE   PHQ-9 Total Score MyChart 5 (Mild depression)   PHQ-9 Total Score 5       [place]        [place]     Tobacco Use    Smoking status: Some Days     Types: Cigars    Smokeless tobacco: Never   Substance Use Topics    Alcohol use: Yes     Comment: 25-30 drinks in a day every other day             1/29/2024     1:17 PM   Alcohol Use   Prescreen: >3 drinks/day or >7 drinks/week? Yes   AUDIT SCORE  13       Last PSA: No results found for: \"PSA\"    Reviewed orders with patient. Reviewed health maintenance and updated orders accordingly - Yes    Reviewed and updated as needed this visit by clinical staff   Tobacco  Allergies  Meds              Reviewed and updated as needed this visit by Provider                    Review of Systems   Constitutional:  Negative for chills and fever.   HENT:  Negative for congestion, ear pain, hearing loss and sore throat.    Eyes:  Negative for pain and visual disturbance.   Respiratory: " " Negative for cough and shortness of breath.    Cardiovascular:  Negative for chest pain and palpitations.   Gastrointestinal:  Negative for abdominal pain, constipation, diarrhea and nausea.   Genitourinary:  Negative for dysuria, frequency, genital sores, hematuria and urgency.   Musculoskeletal:  Negative for arthralgias, joint swelling and myalgias.   Skin:  Negative for rash.   Neurological:  Negative for dizziness, weakness and headaches.   Psychiatric/Behavioral:  The patient is nervous/anxious.        OBJECTIVE:   BP (!) 128/90 (BP Location: Right arm, Patient Position: Sitting, Cuff Size: Adult Large)   Pulse 91   Temp 97.4  F (36.3  C) (Temporal)   Resp 16   Ht 1.803 m (5' 11\")   Wt 101.6 kg (223 lb 14.4 oz)   SpO2 98%   BMI 31.23 kg/m     Estimated body mass index is 31.23 kg/m  as calculated from the following:    Height as of this encounter: 1.803 m (5' 11\").    Weight as of this encounter: 101.6 kg (223 lb 14.4 oz).  Physical Exam  GENERAL: alert and no distress  EYES: Eyes grossly normal to inspection, and conjunctivae and sclerae normal  HENT: ear canals and TM's normal, nose and mouth without ulcers or lesions  NECK: no adenopathy, no asymmetry, masses, or scars  RESP: lungs clear to auscultation - no rales, rhonchi or wheezes  CV: regular rate and rhythm, normal S1 S2, no S3 or S4, no murmur, click or rub, no peripheral edema  ABDOMEN: soft, nontender, no hepatosplenomegaly, no masses   MS: no gross musculoskeletal defects noted, no edema  SKIN: no suspicious lesions or rashes  NEURO: Normal strength and tone, mentation intact and speech normal  PSYCH: mentation appears normal, affect normal/bright  LYMPH: no cervical, supraclavicular, axillary, or inguinal adenopathy    ASSESSMENT/PLAN:   Routine general medical examination at a health care facility  Consider CAC scan.  - Lipid panel reflex to direct LDL Non-fasting; Future  - Hemoglobin A1c; Future  - Comprehensive metabolic panel (BMP + " Alb, Alk Phos, ALT, AST, Total. Bili, TP); Future  - NEISSERIA GONORRHOEA PCR; Future  - CHLAMYDIA TRACHOMATIS PCR  - Treponema Abs w Reflex to RPR and Titer; Future  - HIV Antigen Antibody Combo; Future  - TSH with free T4 reflex; Future  - Lipid panel reflex to direct LDL Non-fasting  - Hemoglobin A1c  - Comprehensive metabolic panel (BMP + Alb, Alk Phos, ALT, AST, Total. Bili, TP)  - NEISSERIA GONORRHOEA PCR  - Treponema Abs w Reflex to RPR and Titer  - HIV Antigen Antibody Combo  - TSH with free T4 reflex      Counseling  Reviewed preventive health counseling, as reflected in patient instructions        He reports that he has been smoking cigars. He has never used smokeless tobacco.              Signed Electronically by: Christine Clifton MD

## 2024-01-30 LAB
ALBUMIN SERPL BCG-MCNC: 5.1 G/DL (ref 3.5–5.2)
ALP SERPL-CCNC: 48 U/L (ref 40–150)
ALT SERPL W P-5'-P-CCNC: 88 U/L (ref 0–70)
ANION GAP SERPL CALCULATED.3IONS-SCNC: 15 MMOL/L (ref 7–15)
AST SERPL W P-5'-P-CCNC: 68 U/L (ref 0–45)
BILIRUB SERPL-MCNC: 0.4 MG/DL
BUN SERPL-MCNC: 9.9 MG/DL (ref 6–20)
C TRACH DNA SPEC QL NAA+PROBE: NEGATIVE
CALCIUM SERPL-MCNC: 10 MG/DL (ref 8.6–10)
CHLORIDE SERPL-SCNC: 98 MMOL/L (ref 98–107)
CHOLEST SERPL-MCNC: 292 MG/DL
CREAT SERPL-MCNC: 0.87 MG/DL (ref 0.67–1.17)
DEPRECATED HCO3 PLAS-SCNC: 25 MMOL/L (ref 22–29)
EGFRCR SERPLBLD CKD-EPI 2021: >90 ML/MIN/1.73M2
FASTING STATUS PATIENT QL REPORTED: YES
GLUCOSE SERPL-MCNC: 85 MG/DL (ref 70–99)
HDLC SERPL-MCNC: 60 MG/DL
HIV 1+2 AB+HIV1 P24 AG SERPL QL IA: NONREACTIVE
LDLC SERPL CALC-MCNC: 158 MG/DL
N GONORRHOEA DNA SPEC QL NAA+PROBE: NEGATIVE
NONHDLC SERPL-MCNC: 232 MG/DL
POTASSIUM SERPL-SCNC: 4.2 MMOL/L (ref 3.4–5.3)
PROT SERPL-MCNC: 8 G/DL (ref 6.4–8.3)
SODIUM SERPL-SCNC: 138 MMOL/L (ref 135–145)
TRIGL SERPL-MCNC: 371 MG/DL
TSH SERPL DL<=0.005 MIU/L-ACNC: 2.15 UIU/ML (ref 0.3–4.2)

## 2024-02-21 NOTE — PROGRESS NOTES
[facility] Weekly Treatment Plan Review      ATTENDANCE for the following date span:  2022 to 2022    Date 22   Group Therapy NA NA N/A 3 hours N/A   Individual Therapy Not buzz Not buzz N/A Not buzz  N/A   Family Therapy Not buzz Not buzz N/A Not buzz N/A   Other (Specify) Not buzz Not buzz N/A Not buzz N/A     Patient attended  group this week with no absence       Weekly Treatment Plan Review     Treatment Plan itiated on: 2022    Dimension1: Acute Intoxication/Withdrawal Potential -   Previous Dimension Ratin  Current Dimension Ratin  Date of Last Use 1/3/2022  Any reports of withdrawal symptoms - No    Narrative: Date of last use of alcohol-1/3/2022 in the evening.  At intake, patient reported after few days of sobriety, he was experiencing jitterly hands, some anxiety, sweat. However, patient did not report need for medical detox and states he was able to tolerate the symptoms. Since the intake, patient reports he is remaining sober and has not reported any issues with withdrawals. Patient is now in Phase 3 of treatment and has no withdrawal concerns.  Pt was educated on PAWS.  Pt states with exercise, healthy eating and sobriety, he has not experienced any PAWS at this time.      Dimension 2: Biomedical Conditions & Complications -   Previous Dimension Ratin  Current Dimension Ratin  Medical Concerns:  None   Current Medications & Medication Changes:  Current Outpatient Medications   Medication     buPROPion (WELLBUTRIN) 75 MG tablet     Digital Therapy (RESET FOR IOS OR ANDROID JACQUELIN) MISC     fluticasone (FLONASE) 50 MCG/ACT nasal spray     No current facility-administered medications for this visit.     Medication Prescriber:  Joel Daniel Irwin Wegener MD, [facility]  Taking meds as prescribed? Yes  Medication side effects or concerns: No  Outside medical appointments this week (list  provider and reason for visit):  None     Narratives: Pt reports his medical and dental health are well at this time.  Pt has [facility] and able to access health care.  Pt has established primary care with Dr. Wally David at [facility]. Patient continues to report good physical health;  involved with regular physical exercises, and eating healthy. Pt states he started jogging since starting outpatient treatment, and feels his physical health is in good shape.  Pt reports besides the jogging, he also joined [employer] and taking classes as well to balance his workout. Pt reports preparing for the [employer] coming up in October this year.    Dimension 3: Emotional/Behavioral Conditions & Complications -   Previous Dimension Ratin  Current Dimension Ratin  PHQ9     PHQ-9 SCORE 2022   PHQ-9 Total Score 23     GAD7     FAISAL-7 SCORE 2021   Total Score 13 (moderate anxiety) -   Total Score 13 21     Mental health diagnosis None   Date of last SIB:  never   Date of  last SI:  never  Date of last HI: never  Behavioral Targets:  N/A  Current MH Assignments:  N/A    Narrative:  At the time of intake, patient did not report past or current mental health diagnoses, however pt reports he is on anti-depression medication prescribed by his primary doctor.  Pt continues to be opened to learning new skills.   Pt shares he is enjoying life without drinking.  Pt particularly talks about his exercise, gym, jogging, golfing and traveling plans for September and October.  Pt reports being selected for a strawberry pie contest in July in [facility]. Pt states he is now part of the [employer] and meeting the local business owners and getting to know people.  Patient also talked about  preparing for the up coming [employer] in October.  Pt seems he is making good progress in this tx.  Pt has been educated on trauma and addiction.  Pt states he wants to look for an  "therapist whom he can address old unresolved issues so this does not become future triggers.  Pt continues to make progress and participate well in group discussion.        Dimension 4: Treatment Acceptance / Resistance -   Previous Dimension Ratin  Current Dimension Ratin  ELIJAH Diagnosis:  Alcohol Use Disorder   303.90 (F10.20) Severe .  Stage - 1  Commitment to tx process/Stage of change- Action  ELIJAH assignments - Maintain internal motivation for recovery by attending groups and listening to fellow group members' addiction stories because he wants to participate in it. When asked what he is most proud of since getting sober, patient states that he is proud of getting healthy daily routine back     Narrative - Patient seems motivated for treatment.  Pt reports there is no pressure/mandate from court/probation/legal issues and he decided to seek help on his term.  Patient continues to manage his life, work and treatment.  Pt states he is feeling stronger with his recovery and treatment has been helpful. 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    Dimension 5: Relapse / Continued Problem Potential -   Previous Dimension Ratin  Current Dimension Rating:  3  Relapses this week - None  Urges to use - None  UA results - No results found for this or any previous visit (from the past 168 hour(s)).    Narrative- Last use of alcohol on 1/3/2022.  This was the date of original intake but patient admits he relapsed on that day. Pt reports the longest sobriety in his lifetime as 5-6 months on his own.  He appeared to have had  little insight into his triggers at the time of intake, as evidenced by his statement. \"I can't put my fingers around it.\"  However, patient states he gained good understanding in treatment about his alcohol use issues  Pt " reports remaining sober throughout treatment and is working on improving relationships, maintaining his job, and increasing physical activities. Patient denies having any new concerns this week.    Dimension 6: Recovery Environment   Previous Dimension Ratin  Current Dimension Ratin  Family Involvement -   Summarize attendance at family groups and family sessions - N/A  Family supportive of treatment?  Yes    Community support group attendance - No  Recreational activities - exercise (30 min jogging a day), busy at work    Narrative -  Patient reports his current incomes come from employment and investments. 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 Patient states he has college degree in [place] and has been able to fulfill his occupational obligations.  He is currently living independently with his girlfriend and renting an apartment in [place]. Patient reports he kept his drinking to himself and none of his friends knew about the depth of his addiction or the fact that he was getting treatment.  Pt states his parents and girlfriends are supportive of his recovery. Patent does not report any past or current legal issues. Patient reports desire to become a professional golfer because he likes the lifestyle of traveling and seeing different places all over the world. Patient reports preparing himself for the [employer], coming up in October of this year.    Justification for Continued Treatment at this Level of Care: Patient reports desire to learn coping skills. Last alcohol use on 1/3/2022    Discharge Planning:  Target Discharge Date/Timeframe:  2022   Med Mgmt Provider/Appt:  [email or web address]   Ind therapy Provider/Appt:  N/A   Family therapy Provider/Appt:  N/A   Other referrals:  N/A      Has vulnerable adult status change? No    Service Coordination:  TBD    Supervision:  Every      Are Treatment Plan goals/objectives effective? Yes  *If no, list changes to treatment  plan:    Are the current goals meeting client's needs? Yes  *If no, list the changes to treatment plan.    Client Input / Response: Patient admits his drinking became problem and expresses his desire to learn recovery skills.  Pt denies any cravings at this time.       *Client agrees with any changes to the treatment plan: N/A  *Client received copy of changes: N/A  *Client is aware of right to access a treatment plan review: Yes   137

## 2024-05-30 ENCOUNTER — TELEPHONE (OUTPATIENT)
Dept: PEDIATRICS | Facility: CLINIC | Age: 39
End: 2024-05-30
Payer: COMMERCIAL

## 2024-05-30 NOTE — TELEPHONE ENCOUNTER
Patient has appointment tomorrow 5/31. Per appointment notes is to schedule coronary Calcium CT scan. This was previously ordered.    This is a radiology study. Has patient scheduled this? He does not need another appointment with me if that is his only question. Please help him schedule and cancel appointment with me if nothing else needed.    Deirdre Milligan, MD  Internal Medicine & Pediatrics  [facility]  She/her

## 2024-12-30 ENCOUNTER — PATIENT OUTREACH (OUTPATIENT)
Dept: CARE COORDINATION | Facility: CLINIC | Age: 39
End: 2024-12-30
Payer: COMMERCIAL

## 2025-01-13 ENCOUNTER — PATIENT OUTREACH (OUTPATIENT)
Dept: CARE COORDINATION | Facility: CLINIC | Age: 40
End: 2025-01-13
Payer: COMMERCIAL

## 2025-03-02 ENCOUNTER — HEALTH MAINTENANCE LETTER (OUTPATIENT)
Age: 40
End: 2025-03-02

## 2025-03-26 ENCOUNTER — OFFICE VISIT (OUTPATIENT)
Dept: FAMILY MEDICINE | Facility: CLINIC | Age: 40
End: 2025-03-26
Payer: COMMERCIAL

## 2025-03-26 VITALS
RESPIRATION RATE: 15 BRPM | HEART RATE: 92 BPM | OXYGEN SATURATION: 96 % | WEIGHT: 230 LBS | SYSTOLIC BLOOD PRESSURE: 136 MMHG | DIASTOLIC BLOOD PRESSURE: 86 MMHG | HEIGHT: 71 IN | BODY MASS INDEX: 32.2 KG/M2 | TEMPERATURE: 97.5 F

## 2025-03-26 DIAGNOSIS — Z00.00 HEALTH MAINTENANCE EXAMINATION: Primary | ICD-10-CM

## 2025-03-26 DIAGNOSIS — E66.09 CLASS 1 OBESITY DUE TO EXCESS CALORIES WITH SERIOUS COMORBIDITY AND BODY MASS INDEX (BMI) OF 32.0 TO 32.9 IN ADULT: ICD-10-CM

## 2025-03-26 DIAGNOSIS — F10.288 ALCOHOL DEPENDENCE WITH OTHER ALCOHOL-INDUCED DISORDER (H): ICD-10-CM

## 2025-03-26 DIAGNOSIS — E66.811 CLASS 1 OBESITY DUE TO EXCESS CALORIES WITH SERIOUS COMORBIDITY AND BODY MASS INDEX (BMI) OF 32.0 TO 32.9 IN ADULT: ICD-10-CM

## 2025-03-26 DIAGNOSIS — F33.2 SEVERE EPISODE OF RECURRENT MAJOR DEPRESSIVE DISORDER, WITHOUT PSYCHOTIC FEATURES (H): ICD-10-CM

## 2025-03-26 DIAGNOSIS — T78.40XA ALLERGIC REACTION, INITIAL ENCOUNTER: ICD-10-CM

## 2025-03-26 DIAGNOSIS — E78.2 MIXED HYPERLIPIDEMIA: ICD-10-CM

## 2025-03-26 DIAGNOSIS — F10.20 UNCOMPLICATED ALCOHOL DEPENDENCE (H): ICD-10-CM

## 2025-03-26 LAB
ALBUMIN SERPL BCG-MCNC: 4.8 G/DL (ref 3.5–5.2)
ALP SERPL-CCNC: 48 U/L (ref 40–150)
ALT SERPL W P-5'-P-CCNC: 106 U/L (ref 0–70)
ANION GAP SERPL CALCULATED.3IONS-SCNC: 13 MMOL/L (ref 7–15)
AST SERPL W P-5'-P-CCNC: 67 U/L (ref 0–45)
BILIRUB SERPL-MCNC: 0.3 MG/DL
BUN SERPL-MCNC: 11.6 MG/DL (ref 6–20)
CALCIUM SERPL-MCNC: 10.2 MG/DL (ref 8.8–10.4)
CHLORIDE SERPL-SCNC: 103 MMOL/L (ref 98–107)
CHOLEST SERPL-MCNC: 308 MG/DL
CREAT SERPL-MCNC: 0.96 MG/DL (ref 0.67–1.17)
EGFRCR SERPLBLD CKD-EPI 2021: >90 ML/MIN/1.73M2
ERYTHROCYTE [DISTWIDTH] IN BLOOD BY AUTOMATED COUNT: 12.2 % (ref 10–15)
FASTING STATUS PATIENT QL REPORTED: YES
FASTING STATUS PATIENT QL REPORTED: YES
GLUCOSE SERPL-MCNC: 91 MG/DL (ref 70–99)
HCO3 SERPL-SCNC: 26 MMOL/L (ref 22–29)
HCT VFR BLD AUTO: 46 % (ref 40–53)
HDLC SERPL-MCNC: 52 MG/DL
HGB BLD-MCNC: 15.9 G/DL (ref 13.3–17.7)
LDLC SERPL CALC-MCNC: 217 MG/DL
MCH RBC QN AUTO: 32 PG (ref 26.5–33)
MCHC RBC AUTO-ENTMCNC: 34.6 G/DL (ref 31.5–36.5)
MCV RBC AUTO: 93 FL (ref 78–100)
NONHDLC SERPL-MCNC: 256 MG/DL
PLATELET # BLD AUTO: 267 10E3/UL (ref 150–450)
POTASSIUM SERPL-SCNC: 4.1 MMOL/L (ref 3.4–5.3)
PROT SERPL-MCNC: 7.7 G/DL (ref 6.4–8.3)
RBC # BLD AUTO: 4.97 10E6/UL (ref 4.4–5.9)
SODIUM SERPL-SCNC: 142 MMOL/L (ref 135–145)
TRIGL SERPL-MCNC: 195 MG/DL
WBC # BLD AUTO: 8.8 10E3/UL (ref 4–11)

## 2025-03-26 PROCEDURE — 36415 COLL VENOUS BLD VENIPUNCTURE: CPT | Performed by: FAMILY MEDICINE

## 2025-03-26 PROCEDURE — 3075F SYST BP GE 130 - 139MM HG: CPT | Performed by: FAMILY MEDICINE

## 2025-03-26 PROCEDURE — 85027 COMPLETE CBC AUTOMATED: CPT | Performed by: FAMILY MEDICINE

## 2025-03-26 PROCEDURE — 96127 BRIEF EMOTIONAL/BEHAV ASSMT: CPT | Performed by: FAMILY MEDICINE

## 2025-03-26 PROCEDURE — 1126F AMNT PAIN NOTED NONE PRSNT: CPT | Performed by: FAMILY MEDICINE

## 2025-03-26 PROCEDURE — 3079F DIAST BP 80-89 MM HG: CPT | Performed by: FAMILY MEDICINE

## 2025-03-26 PROCEDURE — 80061 LIPID PANEL: CPT | Performed by: FAMILY MEDICINE

## 2025-03-26 PROCEDURE — 99214 OFFICE O/P EST MOD 30 MIN: CPT | Mod: 25 | Performed by: FAMILY MEDICINE

## 2025-03-26 PROCEDURE — 99396 PREV VISIT EST AGE 40-64: CPT | Performed by: FAMILY MEDICINE

## 2025-03-26 PROCEDURE — 80053 COMPREHEN METABOLIC PANEL: CPT | Performed by: FAMILY MEDICINE

## 2025-03-26 RX ORDER — NALTREXONE HYDROCHLORIDE 50 MG/1
50 TABLET, FILM COATED ORAL DAILY
Qty: 90 TABLET | Refills: 0 | Status: SHIPPED | OUTPATIENT
Start: 2025-03-26

## 2025-03-26 RX ORDER — ACAMPROSATE CALCIUM 333 MG/1
333 TABLET, DELAYED RELEASE ORAL 3 TIMES DAILY
Qty: 270 TABLET | Refills: 0 | Status: SHIPPED | OUTPATIENT
Start: 2025-03-26

## 2025-03-26 SDOH — HEALTH STABILITY: PHYSICAL HEALTH: ON AVERAGE, HOW MANY DAYS PER WEEK DO YOU ENGAGE IN MODERATE TO STRENUOUS EXERCISE (LIKE A BRISK WALK)?: 3 DAYS

## 2025-03-26 ASSESSMENT — PATIENT HEALTH QUESTIONNAIRE - PHQ9
10. IF YOU CHECKED OFF ANY PROBLEMS, HOW DIFFICULT HAVE THESE PROBLEMS MADE IT FOR YOU TO DO YOUR WORK, TAKE CARE OF THINGS AT HOME, OR GET ALONG WITH OTHER PEOPLE: SOMEWHAT DIFFICULT
SUM OF ALL RESPONSES TO PHQ QUESTIONS 1-9: 11
SUM OF ALL RESPONSES TO PHQ QUESTIONS 1-9: 11

## 2025-03-26 ASSESSMENT — PAIN SCALES - GENERAL: PAINLEVEL_OUTOF10: NO PAIN (0)

## 2025-03-26 ASSESSMENT — SOCIAL DETERMINANTS OF HEALTH (SDOH): HOW OFTEN DO YOU GET TOGETHER WITH FRIENDS OR RELATIVES?: MORE THAN THREE TIMES A WEEK

## 2025-03-26 NOTE — PROGRESS NOTES
Preventive Care Visit  [facility] CHANTALE Quintanilla MD, Family Medicine  Mar 26, 2025      Assessment & Plan     Health maintenance examination    - Lipid panel reflex to direct LDL Non-fasting; Future  - CBC with platelets; Future  - Comprehensive metabolic panel (BMP + Alb, Alk Phos, ALT, AST, Total. Bili, TP); Future    Severe episode of recurrent major depressive disorder, without psychotic features (H)  Stable, not taking any meds any more, keep monitoring     Alcohol dependence with other alcohol-induced disorder (H)  Has been drinking heavily over past 2-3 years, haven't tried any cessation program, is motivated to stop drinking, will have him to try campral and naltrexone for next 3 months and recheck     Uncomplicated alcohol dependence (H)  Mentioned above   - acamprosate (CAMPRAL) 333 MG EC tablet; Take 1 tablet (333 mg) by mouth 3 times daily.  - naltrexone (DEPADE/REVIA) 50 MG tablet; Take 1 tablet (50 mg) by mouth daily.    Allergic reaction, initial encounter  Has been worsneing with nasal obstruction and postnasal drainage over past 4-5 years, will have him to see allergy clinic for further evaluation   - Adult Allergy/Asthma  Referral; Future    Class 1 obesity due to excess calories with serious comorbidity and body mass index (BMI) of 32.0 to 32.9 in adult  Has been working on life style modification with exercise regimen(40 min/day with HR raised up to 70% at times) 4 times daily with diet regimen(2500 Kcal per day with low carb and low fat option) over past 6 months, and failed to lose wt, will have hi to try GLP-1 and recheck in 3 months   - tirzepatide-Weight Management (ZEPBOUND) 2.5 MG/0.5ML prefilled pen; Inject 0.5 mLs (2.5 mg) subcutaneously every 7 days.    Patient has been advised of split billing requirements and indicates understanding: Yes        Nicotine/Tobacco Cessation  He reports that he has been smoking cigars. He has never used smokeless  "tobacco.  Nicotine/Tobacco Cessation Plan  Information offered: Patient not interested at this time      BMI  Estimated body mass index is 32.08 kg/m  as calculated from the following:    Height as of this encounter: 1.803 m (5' 11\").    Weight as of this encounter: 104.3 kg (230 lb).   Weight management plan: Discussed healthy diet and exercise guidelines    Depression Screening Follow Up        3/26/2025     1:16 PM   PHQ   PHQ-9 Total Score 11    Q9: Thoughts of better off dead/self-harm past 2 weeks Not at all       Patient-reported         3/26/2025     1:16 PM   Last PHQ-9   1.  Little interest or pleasure in doing things 1   2.  Feeling down, depressed, or hopeless 1   3.  Trouble falling or staying asleep, or sleeping too much 2   4.  Feeling tired or having little energy 2   5.  Poor appetite or overeating 0   6.  Feeling bad about yourself 2   7.  Trouble concentrating 2   8.  Moving slowly or restless 1   Q9: Thoughts of better off dead/self-harm past 2 weeks 0   PHQ-9 Total Score 11        Patient-reported         Follow Up Actions Taken  Crisis resource information provided in After Visit Summary     Counseling  Appropriate preventive services were addressed with this patient via screening, questionnaire, or discussion as appropriate for fall prevention, nutrition, physical activity, Tobacco-use cessation, social engagement, weight loss and cognition.  Checklist reviewing preventive services available has been given to the patient.  Reviewed patient's diet, addressing concerns and/or questions.   He is at risk for lack of exercise and has been provided with information to increase physical activity for the benefit of his well-being.   He is at risk for psychosocial distress and has been provided with information to reduce risk.   The patient reports drinking more than 3 alcoholic drinks per day and/or more than 7 drhnks per week. The patient was counseled and given information about possible harmful " effects of excessive alcohol intake.The patient's PHQ-9 score is consistent with moderate depression. He was provided with information regarding depression.       FUTURE APPOINTMENTS:       - Follow-up visit in 3 months     Subjective   Albert is a 40 year old, presenting for the following:  Physical        3/26/2025     1:37 PM   Additional Questions   Roomed by Kyle VILLARREAL       Allergies  Onset/Duration: Chronic  Symptoms:   Nasal congestion: YES  Sneezing: No  Red, itchy eyes: No  Progression of Symptoms: worse  Accompanying Signs & Symptoms:  Cough: No  Wheezing: No  Rash: No  Sinus/facial pain: No  History:   Is it seasonal: in the spring   History of Asthma: No  Has allergy testing been done: No    Therapies tried and outcome: Fluticasone, Claritin     Concern - Weight Gain    Description: would like to discuss GLP 1      Advance Care Planning  Patient does not have a Health Care Directive: Discussed advance care planning with patient; however, patient declined at this time.      3/26/2025   General Health   How would you rate your overall physical health? (!) FAIR   Feel stress (tense, anxious, or unable to sleep) Rather much   (!) STRESS CONCERN      3/26/2025   Nutrition   Three or more servings of calcium each day? Yes   Diet: Regular (no restrictions)   How many servings of fruit and vegetables per day? (!) 0-1   How many sweetened beverages each day? 0-1         3/26/2025   Exercise   Days per week of moderate/strenous exercise 3 days         3/26/2025   Social Factors   Frequency of gathering with friends or relatives More than three times a week   Worry food won't last until get money to buy more No   Food not last or not have enough money for food? No   Do you have housing? (Housing is defined as stable permanent housing and does not include staying ouside in a car, in a tent, in an abandoned building, in an overnight shelter, or couch-surfing.) Yes   Are you worried about losing your  housing? No   Lack of transportation? No   Unable to get utilities (heat,electricity)? No         3/26/2025   Dental   Dentist two times every year? Yes         Today's PHQ-9 Score:       3/26/2025     1:16 PM   PHQ-9 SCORE   PHQ-9 Total Score MyChart 11 (Moderate depression)   PHQ-9 Total Score 11        Patient-reported         3/26/2025   Substance Use   Alcohol more than 3/day or more than 7/wk Yes   How often do you have a drink containing alcohol 4 or more times a week   How many alcohol drinks on typical day 7 to 9   How often do you have 5+ drinks at one occasion Daily or almost daily   Audit 2/3 Score 7   How often not able to stop drinking once started Less than monthly   How often failed to do what normally expected Weekly   How often needed first drink in am after a heavy drinking session Weekly   How often feeling of guilt or remorse after drinking Weekly   How often unable to remember what happened the night before Weekly   Have you or someone else been injured because of your drinking No   Has anyone been concerned or suggested you cut down on drinking Yes, during the last year   TOTAL SCORE - AUDIT 28   Do you use any other substances recreationally? (!) DECLINE     Social History     Tobacco Use    Smoking status: Some Days     Types: Cigars    Smokeless tobacco: Never   Vaping Use    Vaping status: Never Used   Substance Use Topics    Alcohol use: Yes     Comment: 25-30 drinks in a day every other day    Drug use: Never           3/26/2025   STI Screening   New sexual partner(s) since last STI/HIV test? No   ASCVD Risk   The 10-year ASCVD risk score (Balbir BELL, et al., 2019) is: 8.4%    Values used to calculate the score:      Age: 40 years      Sex: Male      Is Non- : No      Diabetic: No      Tobacco smoker: Yes      Systolic Blood Pressure: 150 mmHg      Is BP treated: No      HDL Cholesterol: 60 mg/dL      Total Cholesterol: 292 mg/dL        3/26/2025    Contraception/Family Planning   Questions about contraception or family planning No        Reviewed and updated as needed this visit by Provider                    Past Medical History:   Diagnosis Date    Seasonal allergies      No past surgical history on file.  Labs reviewed in EPIC  BP Readings from Last 3 Encounters:   03/26/25 136/86   01/29/24 (!) 128/90   12/15/22 128/81    Wt Readings from Last 3 Encounters:   03/26/25 104.3 kg (230 lb)   01/29/24 101.6 kg (223 lb 14.4 oz)   12/15/22 99.8 kg (220 lb)                  Patient Active Problem List   Diagnosis    Severe episode of recurrent major depressive disorder, without psychotic features (H)    Alcohol dependence with other alcohol-induced disorder (H)     No past surgical history on file.    Social History     Tobacco Use    Smoking status: Some Days     Types: Cigars    Smokeless tobacco: Never   Substance Use Topics    Alcohol use: Yes     Comment: 25-30 drinks in a day every other day     Family History   Problem Relation Age of Onset    Substance Abuse Sister     Depression Sister     Anxiety Disorder Sister          Current Outpatient Medications   Medication Sig Dispense Refill    acamprosate (CAMPRAL) 333 MG EC tablet Take 1 tablet (333 mg) by mouth 3 times daily. 270 tablet 0    naltrexone (DEPADE/REVIA) 50 MG tablet Take 1 tablet (50 mg) by mouth daily. 90 tablet 0    tirzepatide-Weight Management (ZEPBOUND) 2.5 MG/0.5ML prefilled pen Inject 0.5 mLs (2.5 mg) subcutaneously every 7 days. 2 mL 2    buPROPion (WELLBUTRIN XL) 150 MG 24 hr tablet TAKE 1 TABLET(150 MG) BY MOUTH EVERY MORNING (Patient not taking: Reported on 3/26/2025) 90 tablet 1    fluticasone (FLONASE) 50 MCG/ACT nasal spray Spray 2 sprays into both nostrils daily (Patient not taking: Reported on 3/26/2025) 16 g 11     Allergies   Allergen Reactions    Seasonal Allergies      Recent Labs   Lab Test 01/29/24  1454 01/02/23  1406 06/14/21  1036   A1C 5.2  --   --    * 205*  "169*   HDL 60 49 61   TRIG 371* 223* 127   ALT 88* 48  --    CR 0.87 1.07  --    GFRESTIMATED >90 >90  --    POTASSIUM 4.2 4.1  --    TSH 2.15  --   --                Review of Systems  Constitutional, HEENT, cardiovascular, pulmonary, GI, , musculoskeletal, neuro, skin, endocrine and psych systems are negative, except as otherwise noted.     Objective    Exam  /86   Pulse 92   Temp 97.5  F (36.4  C) (Tympanic)   Resp 15   Ht 1.803 m (5' 11\")   Wt 104.3 kg (230 lb)   SpO2 96%   BMI 32.08 kg/m     Estimated body mass index is 32.08 kg/m  as calculated from the following:    Height as of this encounter: 1.803 m (5' 11\").    Weight as of this encounter: 104.3 kg (230 lb).    Physical Exam  GENERAL: alert and no distress  EYES: Eyes grossly normal to inspection, PERRL and conjunctivae and sclerae normal  HENT: ear canals and TM's normal, nose and mouth without ulcers or lesions  NECK: no adenopathy, no asymmetry, masses, or scars  RESP: lungs clear to auscultation - no rales, rhonchi or wheezes  CV: regular rate and rhythm, normal S1 S2, no S3 or S4, no murmur, click or rub, no peripheral edema  ABDOMEN: soft, nontender, no hepatosplenomegaly, no masses and bowel sounds normal  MS: no gross musculoskeletal defects noted, no edema  SKIN: no suspicious lesions or rashes  NEURO: Normal strength and tone, mentation intact and speech normal  BACK: no CVA tenderness, no paralumbar tenderness  PSYCH: mentation appears normal, affect normal/bright  LYMPH: no cervical, supraclavicular, axillary, or inguinal adenopathy        Signed Electronically by: Kwan Quintanilla MD    "

## 2025-03-27 RX ORDER — ROSUVASTATIN CALCIUM 10 MG/1
10 TABLET, COATED ORAL DAILY
Qty: 90 TABLET | Refills: 1 | Status: SHIPPED | OUTPATIENT
Start: 2025-03-27

## 2025-06-12 ENCOUNTER — DOCUMENTATION ONLY (OUTPATIENT)
Dept: ALLERGY | Facility: CLINIC | Age: 40
End: 2025-06-12

## 2025-07-22 ENCOUNTER — TELEPHONE (OUTPATIENT)
Dept: FAMILY MEDICINE | Facility: CLINIC | Age: 40
End: 2025-07-22
Payer: COMMERCIAL

## 2025-07-22 DIAGNOSIS — F10.20 UNCOMPLICATED ALCOHOL DEPENDENCE (H): ICD-10-CM

## 2025-07-27 RX ORDER — ACAMPROSATE CALCIUM 333 MG/1
333 TABLET, DELAYED RELEASE ORAL 3 TIMES DAILY
Qty: 270 TABLET | Refills: 0 | OUTPATIENT
Start: 2025-07-27